# Patient Record
Sex: FEMALE | Race: WHITE | HISPANIC OR LATINO | Employment: PART TIME | URBAN - METROPOLITAN AREA
[De-identification: names, ages, dates, MRNs, and addresses within clinical notes are randomized per-mention and may not be internally consistent; named-entity substitution may affect disease eponyms.]

---

## 2019-03-14 ENCOUNTER — NON-PROVIDER VISIT (OUTPATIENT)
Dept: OBGYN | Facility: CLINIC | Age: 24
End: 2019-03-14

## 2019-03-14 DIAGNOSIS — Z32.00 ENCOUNTER FOR PREGNANCY TEST, RESULT UNKNOWN: ICD-10-CM

## 2019-03-14 LAB
INT CON NEG: NEGATIVE
INT CON POS: POSITIVE
POC URINE PREGNANCY TEST: NEGATIVE

## 2019-03-14 PROCEDURE — 81025 URINE PREGNANCY TEST: CPT | Performed by: OBSTETRICS & GYNECOLOGY

## 2020-07-09 ENCOUNTER — HOSPITAL ENCOUNTER (OUTPATIENT)
Dept: LAB | Facility: MEDICAL CENTER | Age: 25
End: 2020-07-09
Attending: OBSTETRICS & GYNECOLOGY

## 2020-07-09 LAB — B-HCG SERPL-ACNC: 362 MIU/ML (ref 0–5)

## 2020-07-09 PROCEDURE — 84702 CHORIONIC GONADOTROPIN TEST: CPT

## 2020-07-09 PROCEDURE — 36415 COLL VENOUS BLD VENIPUNCTURE: CPT

## 2020-08-03 ENCOUNTER — HOSPITAL ENCOUNTER (OUTPATIENT)
Dept: LAB | Facility: MEDICAL CENTER | Age: 25
End: 2020-08-03
Attending: OBSTETRICS & GYNECOLOGY

## 2020-08-03 LAB — B-HCG SERPL-ACNC: 3028 MIU/ML (ref 0–5)

## 2020-08-03 PROCEDURE — 36415 COLL VENOUS BLD VENIPUNCTURE: CPT

## 2020-08-03 PROCEDURE — 84702 CHORIONIC GONADOTROPIN TEST: CPT

## 2021-05-19 ENCOUNTER — OFFICE VISIT (OUTPATIENT)
Dept: OBGYN | Facility: CLINIC | Age: 26
End: 2021-05-19

## 2021-06-02 PROBLEM — Z34.00 PREGNANCY, SUPERVISION OF FIRST: Status: ACTIVE | Noted: 2021-06-02

## 2021-06-04 ENCOUNTER — HOSPITAL ENCOUNTER (OUTPATIENT)
Facility: MEDICAL CENTER | Age: 26
End: 2021-06-04
Attending: NURSE PRACTITIONER
Payer: COMMERCIAL

## 2021-06-04 ENCOUNTER — INITIAL PRENATAL (OUTPATIENT)
Dept: OBGYN | Facility: CLINIC | Age: 26
End: 2021-06-04

## 2021-06-04 VITALS — DIASTOLIC BLOOD PRESSURE: 60 MMHG | WEIGHT: 160 LBS | SYSTOLIC BLOOD PRESSURE: 112 MMHG

## 2021-06-04 DIAGNOSIS — Z34.00 ENCOUNTER FOR SUPERVISION PREGNANCY IN PRIMIGRAVIDA, ANTEPARTUM: ICD-10-CM

## 2021-06-04 DIAGNOSIS — O09.811 PREGNANCY RESULTING FROM ASSISTED REPRODUCTIVE TECHNOLOGY IN FIRST TRIMESTER: ICD-10-CM

## 2021-06-04 DIAGNOSIS — Z34.90 PRENATAL CARE, ANTEPARTUM: ICD-10-CM

## 2021-06-04 LAB
APPEARANCE UR: NORMAL
BILIRUB UR STRIP-MCNC: NORMAL MG/DL
COLOR UR AUTO: NORMAL
GLUCOSE UR STRIP.AUTO-MCNC: NEGATIVE MG/DL
KETONES UR STRIP.AUTO-MCNC: NORMAL MG/DL
LEUKOCYTE ESTERASE UR QL STRIP.AUTO: NORMAL
NITRITE UR QL STRIP.AUTO: NEGATIVE
PH UR STRIP.AUTO: 5.5 [PH] (ref 5–8)
PROT UR QL STRIP: NEGATIVE MG/DL
RBC UR QL AUTO: NORMAL
SP GR UR STRIP.AUTO: 1.03
UROBILINOGEN UR STRIP-MCNC: NORMAL MG/DL

## 2021-06-04 PROCEDURE — 0500F INITIAL PRENATAL CARE VISIT: CPT | Performed by: NURSE PRACTITIONER

## 2021-06-04 PROCEDURE — 81002 URINALYSIS NONAUTO W/O SCOPE: CPT | Performed by: NURSE PRACTITIONER

## 2021-06-04 RX ORDER — PNV NO.95/FERROUS FUM/FOLIC AC 28MG-0.8MG
TABLET ORAL
COMMUNITY
End: 2021-12-08

## 2021-06-04 ASSESSMENT — EDINBURGH POSTNATAL DEPRESSION SCALE (EPDS)
I HAVE BLAMED MYSELF UNNECESSARILY WHEN THINGS WENT WRONG: YES, SOME OF THE TIME
THE THOUGHT OF HARMING MYSELF HAS OCCURRED TO ME: NEVER
THINGS HAVE BEEN GETTING ON TOP OF ME: NO, MOST OF THE TIME I HAVE COPED QUITE WELL
I HAVE LOOKED FORWARD WITH ENJOYMENT TO THINGS: AS MUCH AS I EVER DID
I HAVE BEEN ABLE TO LAUGH AND SEE THE FUNNY SIDE OF THINGS: AS MUCH AS I ALWAYS COULD
I HAVE FELT SCARED OR PANICKY FOR NO GOOD REASON: YES, SOMETIMES
TOTAL SCORE: 5
I HAVE BEEN ANXIOUS OR WORRIED FOR NO GOOD REASON: NO, NOT AT ALL
I HAVE BEEN SO UNHAPPY THAT I HAVE BEEN CRYING: NO, NEVER
I HAVE FELT SAD OR MISERABLE: NO, NOT AT ALL
I HAVE BEEN SO UNHAPPY THAT I HAVE HAD DIFFICULTY SLEEPING: NOT AT ALL

## 2021-06-04 NOTE — PROGRESS NOTES
Subjective:   Liza Pearson is a 26 y.o.  who presents for her new OB exam.  She is 12w6d with an LOLA of Estimated Date of Delivery: 21 by fertility clinic . She is feeling well and has no concerns at this time. Denies VB, LOF, contractions or pain. No ER visits or previous care in this pregnancy. Denies dysuria, vaginal DC, fever. Reports no fetal movement. Desires AFP.  Declines CF.  Declines NIPT testing.     History reviewed. No pertinent past medical history.    Psych Hx: Patient denies any history of depression, anxiety, PTSD, bipolar or any other psychological issues.     History reviewed. No pertinent surgical history.     OB History    Para Term  AB Living   2       1     SAB TAB Ectopic Molar Multiple Live Births   1                # Outcome Date GA Lbr Kin/2nd Weight Sex Delivery Anes PTL Lv   2 Current            1 SAB 20 7w0d             Obstetric Comments   Pregnancy planned. FOB involved and supportive. Pt does work at [employer]        Gynecological Hx: Denies any hx of STIs, including HSV. Denies any vulvovaginal disorders and no hx of abnormal cervical cytology. Last pap not done.     Sexual Hx: One current male partner, who is FOB.     Contraceptive Hx: Has used condoms in the past and has since discontinued use.     History reviewed. No pertinent family history.     Denies any genetic disorders in family history.     Social History     Socioeconomic History   • Marital status:      Spouse name: Not on file   • Number of children: Not on file   • Years of education: Not on file   • Highest education level: Not on file   Occupational History   • Not on file   Tobacco Use   • Smoking status: Never Smoker   • Smokeless tobacco: Never Used   Substance and Sexual Activity   • Alcohol use: Never   • Drug use: Never   • Sexual activity: Not Currently     Partners: Male   Other Topics Concern   • Not on file   Social History Narrative   • Not on file      Social Determinants of Health     Financial Resource Strain:    • Difficulty of Paying Living Expenses:    Food Insecurity:    • Worried About Running Out of Food in the Last Year:    • Ran Out of Food in the Last Year:    Transportation Needs:    • Lack of Transportation (Medical):    • Lack of Transportation (Non-Medical):    Physical Activity:    • Days of Exercise per Week:    • Minutes of Exercise per Session:    Stress:    • Feeling of Stress :    Social Connections:    • Frequency of Communication with Friends and Family:    • Frequency of Social Gatherings with Friends and Family:    • Attends Gnosticist Services:    • Active Member of Clubs or Organizations:    • Attends Club or Organization Meetings:    • Marital Status:    Intimate Partner Violence:    • Fear of Current or Ex-Partner:    • Emotionally Abused:    • Physically Abused:    • Sexually Abused:        FOB is involve and lives with Liza Pearson.  Pregnancy is planned.    She is currently working at [id number], denies any heavy lifting or exposure to potential teratogens like environmental or occupational toxins.   Denies alcohol use, drug use, or tobacco use in pregnancy.   Denies any current or hx of sexual, emotional or physical abuse or trauma.     Current Medications: PNV   Allergies: Denies allergies to medications, food, or environmental allergies    Objective:      Vitals:    06/04/21 1515   BP: 112/60   Weight: 72.6 kg (160 lb)        See Prenatal Physical and Prenatal Vitals  UA WNL today    Prenatal Physical:  General Exam:  HEENT: normal    Heart: normal    Thyroid: normal    Lungs: normal    Lymph Nodes: normal    Breasts: normal    Neurological: normal    Abdomen: normal    Skin: normal    Extremities: normal    Pelvic Exam:  Vulva:  Normal  Vagina:  Inflammation  Vagina comment:  Very friable cervix  Cervix:  Normal  Uterus (wks):  13  Adnexa:  Normal  Rectum:  Not assessed        Assessment:      1.  IUP @ 12w6d per  US      2.  S=D      3.  See problem list as follows       Patient Active Problem List    Diagnosis Date Noted   • IVF pregnancy  06/04/2021   • Supervision of first pregnancy  06/02/2021         Plan:   - GC/CT & pap done today   - Prenatal labs ordered - lab slip given  - Discussed PNV, nutrition, adequate water intake, and exercise/weight gain in pregnancy  - NOB informational packet with anticipatory guidance given  - Information on Centering Pregnancy given, pt interested   - S/sx of pregnancy warning signs and PTL precautions given  - Complete OB US in 8 wks  - Return to [facility] in 6 wks

## 2021-06-04 NOTE — PROGRESS NOTES
NOB today  LMP: 3/6/21  Last pap: Never  [phone] (home)   Pharmacy confirmed  On PNV  Cystic Fibrosis test declined  AFP   #[id number]  C/o Pt states she feels good

## 2021-06-05 DIAGNOSIS — Z34.00 ENCOUNTER FOR SUPERVISION PREGNANCY IN PRIMIGRAVIDA, ANTEPARTUM: ICD-10-CM

## 2021-06-09 LAB
C TRACH DNA GENITAL QL NAA+PROBE: NEGATIVE
CYTOLOGY REG CYTOL: NORMAL
N GONORRHOEA DNA GENITAL QL NAA+PROBE: NEGATIVE
SPECIMEN SOURCE: NORMAL

## 2021-06-25 ENCOUNTER — APPOINTMENT (OUTPATIENT)
Dept: OBGYN | Facility: CLINIC | Age: 26
End: 2021-06-25
Payer: COMMERCIAL

## 2021-06-30 ENCOUNTER — HOSPITAL ENCOUNTER (OUTPATIENT)
Dept: LAB | Facility: MEDICAL CENTER | Age: 26
End: 2021-06-30
Attending: NURSE PRACTITIONER
Payer: COMMERCIAL

## 2021-06-30 DIAGNOSIS — Z34.00 ENCOUNTER FOR SUPERVISION PREGNANCY IN PRIMIGRAVIDA, ANTEPARTUM: ICD-10-CM

## 2021-06-30 LAB
ABO GROUP BLD: NORMAL
APPEARANCE UR: ABNORMAL
BACTERIA #/AREA URNS HPF: ABNORMAL /HPF
BASOPHILS # BLD AUTO: 0.6 % (ref 0–1.8)
BASOPHILS # BLD: 0.06 K/UL (ref 0–0.12)
BILIRUB UR QL STRIP.AUTO: NEGATIVE
BLD GP AB SCN SERPL QL: NORMAL
COLOR UR: YELLOW
EOSINOPHIL # BLD AUTO: 0.17 K/UL (ref 0–0.51)
EOSINOPHIL NFR BLD: 1.6 % (ref 0–6.9)
EPI CELLS #/AREA URNS HPF: ABNORMAL /HPF
ERYTHROCYTE [DISTWIDTH] IN BLOOD BY AUTOMATED COUNT: 43.6 FL (ref 35.9–50)
GLUCOSE UR STRIP.AUTO-MCNC: NEGATIVE MG/DL
HBV SURFACE AG SER QL: ABNORMAL
HCT VFR BLD AUTO: 35.8 % (ref 37–47)
HCV AB SER QL: NORMAL
HGB BLD-MCNC: 12.2 G/DL (ref 12–16)
HIV 1+2 AB+HIV1 P24 AG SERPL QL IA: NORMAL
IMM GRANULOCYTES # BLD AUTO: 0.04 K/UL (ref 0–0.11)
IMM GRANULOCYTES NFR BLD AUTO: 0.4 % (ref 0–0.9)
KETONES UR STRIP.AUTO-MCNC: 15 MG/DL
LEUKOCYTE ESTERASE UR QL STRIP.AUTO: ABNORMAL
LYMPHOCYTES # BLD AUTO: 2.05 K/UL (ref 1–4.8)
LYMPHOCYTES NFR BLD: 19.2 % (ref 22–41)
MCH RBC QN AUTO: 33.2 PG (ref 27–33)
MCHC RBC AUTO-ENTMCNC: 34.1 G/DL (ref 33.6–35)
MCV RBC AUTO: 97.3 FL (ref 81.4–97.8)
MICRO URNS: ABNORMAL
MONOCYTES # BLD AUTO: 0.65 K/UL (ref 0–0.85)
MONOCYTES NFR BLD AUTO: 6.1 % (ref 0–13.4)
NEUTROPHILS # BLD AUTO: 7.72 K/UL (ref 2–7.15)
NEUTROPHILS NFR BLD: 72.1 % (ref 44–72)
NITRITE UR QL STRIP.AUTO: NEGATIVE
NRBC # BLD AUTO: 0 K/UL
NRBC BLD-RTO: 0 /100 WBC
PH UR STRIP.AUTO: 6 [PH] (ref 5–8)
PLATELET # BLD AUTO: 316 K/UL (ref 164–446)
PMV BLD AUTO: 10.3 FL (ref 9–12.9)
PROT UR QL STRIP: NEGATIVE MG/DL
RBC # BLD AUTO: 3.68 M/UL (ref 4.2–5.4)
RBC UR QL AUTO: ABNORMAL
RH BLD: NORMAL
RUBV AB SER QL: 208 IU/ML
SP GR UR STRIP.AUTO: >=1.03
TREPONEMA PALLIDUM IGG+IGM AB [PRESENCE] IN SERUM OR PLASMA BY IMMUNOASSAY: ABNORMAL
TSH SERPL DL<=0.005 MIU/L-ACNC: 1.05 UIU/ML (ref 0.38–5.33)
UROBILINOGEN UR STRIP.AUTO-MCNC: 0.2 MG/DL
WBC # BLD AUTO: 10.7 K/UL (ref 4.8–10.8)
WBC #/AREA URNS HPF: ABNORMAL /HPF

## 2021-07-02 LAB
AMPHET CTO UR CFM-MCNC: NEGATIVE NG/ML
BACTERIA UR CULT: NORMAL
BARBITURATES CTO UR CFM-MCNC: NEGATIVE NG/ML
BENZODIAZ CTO UR CFM-MCNC: NEGATIVE NG/ML
CANNABINOIDS CTO UR CFM-MCNC: NEGATIVE NG/ML
COCAINE CTO UR CFM-MCNC: NEGATIVE NG/ML
DRUG COMMENT 753798: NORMAL
METHADONE CTO UR CFM-MCNC: NEGATIVE NG/ML
OPIATES CTO UR CFM-MCNC: NEGATIVE NG/ML
PCP CTO UR CFM-MCNC: NEGATIVE NG/ML
PROPOXYPH CTO UR CFM-MCNC: NEGATIVE NG/ML
SIGNIFICANT IND 70042: NORMAL
SITE SITE: NORMAL
SOURCE SOURCE: NORMAL

## 2021-07-26 ENCOUNTER — APPOINTMENT (OUTPATIENT)
Dept: RADIOLOGY | Facility: IMAGING CENTER | Age: 26
End: 2021-07-26
Attending: NURSE PRACTITIONER
Payer: COMMERCIAL

## 2021-07-26 DIAGNOSIS — Z34.00 ENCOUNTER FOR SUPERVISION PREGNANCY IN PRIMIGRAVIDA, ANTEPARTUM: ICD-10-CM

## 2021-07-26 PROCEDURE — 76805 OB US >/= 14 WKS SNGL FETUS: CPT | Mod: TC | Performed by: NURSE PRACTITIONER

## 2021-07-28 ENCOUNTER — ROUTINE PRENATAL (OUTPATIENT)
Dept: OBGYN | Facility: CLINIC | Age: 26
End: 2021-07-28
Payer: COMMERCIAL

## 2021-07-28 VITALS — WEIGHT: 163 LBS | SYSTOLIC BLOOD PRESSURE: 120 MMHG | DIASTOLIC BLOOD PRESSURE: 64 MMHG

## 2021-07-28 DIAGNOSIS — Q27.0 TWO VESSEL CORD: ICD-10-CM

## 2021-07-28 PROCEDURE — 90040 PR PRENATAL FOLLOW UP: CPT | Performed by: NURSE PRACTITIONER

## 2021-07-28 NOTE — PROGRESS NOTES
OB follow up   + fetal movement.  No VB, LOF or UC's.  AFP ordered  [phone] (home)   Preferred pharmacy confirmed.   # [id number]

## 2021-07-28 NOTE — PROGRESS NOTES
SUBJECTIVE:  Pt is a 26 y.o.   at 20w4d  gestation. Presents today for follow-up prenatal care. Reports no issues at this time.  Reports light  fetal movement. Denies regular cramping/contractions, bleeding or leaking of fluid. Denies dysuria, headaches, N/V. Generally feels well today.      OBJECTIVE:  - See prenatal vitals flow  -   Vitals:    21 0948   BP: 120/64   Weight: 73.9 kg (163 lb)                 ASSESSMENT:   - IUP at 20w4d    - S=D   -   Patient Active Problem List    Diagnosis Date Noted   • Two vessel cord 2021   • Pregnancy resulting from assisted reproductive technology in first trimester 2021   • Supervision of first pregnancy  2021         PLAN:  - S/sx pregnancy and labor warning signs vs general discomforts discussed  - Fetal movements and/or kick counts reviewed   - Adequate hydration reinforced  - Nutrition/exercise/vitamin education; continue PNV  - Reviewed SUA and f/u growth scan in 8 weeks  - AFP ordered   - Anticipatory guidance given  - RTC for Centering in two weeks

## 2021-08-06 ENCOUNTER — HOSPITAL ENCOUNTER (OUTPATIENT)
Dept: LAB | Facility: MEDICAL CENTER | Age: 26
End: 2021-08-06
Attending: NURSE PRACTITIONER
Payer: COMMERCIAL

## 2021-08-06 DIAGNOSIS — Q27.0 TWO VESSEL CORD: ICD-10-CM

## 2021-08-06 PROCEDURE — 36415 COLL VENOUS BLD VENIPUNCTURE: CPT

## 2021-08-06 PROCEDURE — 81511 FTL CGEN ABNOR FOUR ANAL: CPT

## 2021-08-09 LAB
# FETUSES US: NORMAL
AFP MOM SERPL: 0.82
AFP SERPL-MCNC: 62 NG/ML
AGE - REPORTED: 26.8 YR
CURRENT SMOKER: NO
FAMILY MEMBER DISEASES HX: NO
GA METHOD: NORMAL
GA: NORMAL WK
HCG MOM SERPL: 0.84
HCG SERPL-ACNC: NORMAL IU/L
HX OF HEREDITARY DISORDERS: NO
IDDM PATIENT QL: NO
INHIBIN A MOM SERPL: 1.13
INHIBIN A SERPL-MCNC: 207 PG/ML
INTEGRATED SCN PATIENT-IMP: NORMAL
PATHOLOGY STUDY: NORMAL
SPECIMEN DRAWN SERPL: NORMAL
U ESTRIOL MOM SERPL: 1.24
U ESTRIOL SERPL-MCNC: 3.81 NG/ML

## 2021-08-25 ENCOUNTER — ROUTINE PRENATAL (OUTPATIENT)
Dept: OBGYN | Facility: CLINIC | Age: 26
End: 2021-08-25
Payer: COMMERCIAL

## 2021-08-25 VITALS — WEIGHT: 166 LBS | DIASTOLIC BLOOD PRESSURE: 64 MMHG | SYSTOLIC BLOOD PRESSURE: 118 MMHG

## 2021-08-25 DIAGNOSIS — Z34.82 ENCOUNTER FOR SUPERVISION OF OTHER NORMAL PREGNANCY IN SECOND TRIMESTER: ICD-10-CM

## 2021-08-25 PROCEDURE — 90040 PR PRENATAL FOLLOW UP: CPT | Performed by: NURSE PRACTITIONER

## 2021-08-25 NOTE — PROGRESS NOTES
SUBJECTIVE:  Pt is a 26 y.o.   at 24w4d  gestation. Presents today for follow-up prenatal care. Reports no issues at this time.  Reports fetal movement. Denies regular cramping/contractions, bleeding or leaking of fluid. Denies dysuria, headaches, N/V. Generally feels well today except some lighter headaches and vision changes every so often, and some lower abdominal stretching pain in her uterus.     OBJECTIVE:  - See prenatal vitals flow  -   Vitals:    21 0813   BP: 118/64   Weight: 75.3 kg (166 lb)                 ASSESSMENT:   - IUP at 24w4d    - S=D   -   Patient Active Problem List    Diagnosis Date Noted   • Two vessel cord 2021   • Pregnancy resulting from assisted reproductive technology in first trimester 2021         PLAN:  - S/sx pregnancy and labor warning signs vs general discomforts discussed  - Fetal movements and/or kick counts reviewed   - Adequate hydration reinforced  - Nutrition/exercise/vitamin education; continue PNV  - Third tri labs ordered   - Remedies for headaches including tylenol with caffeine, reports she eats frequently and drinks enough water   - Reviewed COVID-19 vaccination recommendations: pt reports she has not thought about getting it but desires more information to read over  - F/u growth US scheduled   - Anticipatory guidance given  - RTC in 4 weeks for follow-up prenatal care

## 2021-08-25 NOTE — NON-PROVIDER
OB follow up   + fetal movement.  No VB, LOF or UC's.  Phone # [phone]  Preferred pharmacy confirmed.

## 2021-08-25 NOTE — PROGRESS NOTES
Pt here today for OB follow up  Reports +FM  WT: 166 lb  BP: 118/64  Preferred pharmacy verified with pt.  Pt states no complaints or concerns today  3rd trimester labs ordered today, pt given instructions  Good # [phone]

## 2021-10-05 ENCOUNTER — ROUTINE PRENATAL (OUTPATIENT)
Dept: OBGYN | Facility: CLINIC | Age: 26
End: 2021-10-05
Payer: COMMERCIAL

## 2021-10-05 DIAGNOSIS — O09.811 PREGNANCY RESULTING FROM ASSISTED REPRODUCTIVE TECHNOLOGY IN FIRST TRIMESTER: ICD-10-CM

## 2021-10-05 PROCEDURE — 90040 PR PRENATAL FOLLOW UP: CPT | Performed by: PHYSICIAN ASSISTANT

## 2021-10-06 VITALS — SYSTOLIC BLOOD PRESSURE: 126 MMHG | DIASTOLIC BLOOD PRESSURE: 72 MMHG | WEIGHT: 174 LBS

## 2021-10-06 NOTE — PROGRESS NOTES
OB follow up   + fetal movement.  No VB, LOF or UC's.  Pt states she is having some pain in her upper and lower abdomin   [phone] (home)   Preferred pharmacy confirmed.  Flu vaccine and Tdap ask next appt as computers were down  Please give pt CHECO and instructions as computer was down  BTL declined

## 2021-10-07 NOTE — PROGRESS NOTES
*Epic down during visit, so all info given before visit was from MA. Pt has no complaints with cramping, UCs, VB, LOF, though pt has had some pain in upper abd which is likely fetal position, resolved with position changes. Pt also notes occ lower abd cramping intermittently, so pt urged to incr water intake and PTL precautions reviewed. +FM - will need FKC next visit due to Epic being down. Also will need to be offered flu and TDaP vaccines next visit. Pt also was supposed to have an US for growth today, likely due to S>D, but missed appt, so will reschedule for next avail. Fundal height c/w dates today. Declines BTL. RTC 2 wk or sooner prn.

## 2021-10-18 ENCOUNTER — HOSPITAL ENCOUNTER (OUTPATIENT)
Dept: LAB | Facility: MEDICAL CENTER | Age: 26
End: 2021-10-18
Attending: NURSE PRACTITIONER
Payer: COMMERCIAL

## 2021-10-18 DIAGNOSIS — Z34.82 ENCOUNTER FOR SUPERVISION OF OTHER NORMAL PREGNANCY IN SECOND TRIMESTER: ICD-10-CM

## 2021-10-18 LAB
ERYTHROCYTE [DISTWIDTH] IN BLOOD BY AUTOMATED COUNT: 44.6 FL (ref 35.9–50)
GLUCOSE 1H P 50 G GLC PO SERPL-MCNC: 185 MG/DL (ref 70–139)
HCT VFR BLD AUTO: 32.9 % (ref 37–47)
HGB BLD-MCNC: 10.3 G/DL (ref 12–16)
MCH RBC QN AUTO: 28.8 PG (ref 27–33)
MCHC RBC AUTO-ENTMCNC: 31.3 G/DL (ref 33.6–35)
MCV RBC AUTO: 91.9 FL (ref 81.4–97.8)
PLATELET # BLD AUTO: 275 K/UL (ref 164–446)
PMV BLD AUTO: 11.2 FL (ref 9–12.9)
RBC # BLD AUTO: 3.58 M/UL (ref 4.2–5.4)
TREPONEMA PALLIDUM IGG+IGM AB [PRESENCE] IN SERUM OR PLASMA BY IMMUNOASSAY: NORMAL
WBC # BLD AUTO: 8.8 K/UL (ref 4.8–10.8)

## 2021-10-18 PROCEDURE — 86780 TREPONEMA PALLIDUM: CPT

## 2021-10-18 PROCEDURE — 82950 GLUCOSE TEST: CPT

## 2021-10-18 PROCEDURE — 36415 COLL VENOUS BLD VENIPUNCTURE: CPT

## 2021-10-18 PROCEDURE — 85027 COMPLETE CBC AUTOMATED: CPT

## 2021-10-20 ENCOUNTER — HOSPITAL ENCOUNTER (OUTPATIENT)
Dept: LAB | Facility: MEDICAL CENTER | Age: 26
End: 2021-10-20
Attending: NURSE PRACTITIONER
Payer: COMMERCIAL

## 2021-10-20 ENCOUNTER — ROUTINE PRENATAL (OUTPATIENT)
Dept: OBGYN | Facility: CLINIC | Age: 26
End: 2021-10-20
Payer: COMMERCIAL

## 2021-10-20 ENCOUNTER — APPOINTMENT (OUTPATIENT)
Dept: RADIOLOGY | Facility: IMAGING CENTER | Age: 26
End: 2021-10-20
Attending: NURSE PRACTITIONER
Payer: COMMERCIAL

## 2021-10-20 VITALS — DIASTOLIC BLOOD PRESSURE: 70 MMHG | SYSTOLIC BLOOD PRESSURE: 108 MMHG | WEIGHT: 177 LBS

## 2021-10-20 DIAGNOSIS — R73.09 ELEVATED GLUCOSE: ICD-10-CM

## 2021-10-20 DIAGNOSIS — Z34.83 ENCOUNTER FOR SUPERVISION OF OTHER NORMAL PREGNANCY IN THIRD TRIMESTER: ICD-10-CM

## 2021-10-20 DIAGNOSIS — Q27.0 TWO VESSEL CORD: ICD-10-CM

## 2021-10-20 PROBLEM — O99.019 ANEMIA IN PREGNANCY: Status: ACTIVE | Noted: 2021-10-20

## 2021-10-20 LAB
GLUCOSE 1H P CHAL SERPL-MCNC: 197 MG/DL (ref 65–180)
GLUCOSE 2H P CHAL SERPL-MCNC: 167 MG/DL (ref 65–155)
GLUCOSE 3H P CHAL SERPL-MCNC: 143 MG/DL (ref 65–140)
GLUCOSE BS SERPL-MCNC: 94 MG/DL (ref 65–95)

## 2021-10-20 PROCEDURE — 36415 COLL VENOUS BLD VENIPUNCTURE: CPT

## 2021-10-20 PROCEDURE — 76816 OB US FOLLOW-UP PER FETUS: CPT | Mod: TC | Performed by: NURSE PRACTITIONER

## 2021-10-20 PROCEDURE — 90471 IMMUNIZATION ADMIN: CPT | Performed by: NURSE PRACTITIONER

## 2021-10-20 PROCEDURE — 90040 PR PRENATAL FOLLOW UP: CPT | Performed by: NURSE PRACTITIONER

## 2021-10-20 PROCEDURE — 82952 GTT-ADDED SAMPLES: CPT

## 2021-10-20 PROCEDURE — 82951 GLUCOSE TOLERANCE TEST (GTT): CPT

## 2021-10-20 PROCEDURE — 90715 TDAP VACCINE 7 YRS/> IM: CPT | Performed by: NURSE PRACTITIONER

## 2021-10-20 NOTE — LETTER
Cuente los Movimientos de mares Bebé  Otro paso importante para la reshma de mares bebé    Liza Pearson     [facility]            Dept: [phone]    ¿Cuántas semanas tiene de embarazo? 32w4d    Fecha cuando tiene que comenzar a contar el movimiento: Hoy                  [place] debe usar juan miguel diagrama    Jessica manera en que mares doctor puede controlar a reshma de mares bebé es sabiendo cuantas veces se mueve mares bebé en el útero, o por medio de las “pataditas”.  Usted podrá ayudarle a mares médico al usar cada día el siguiente diagrama.    Cada día, usted debe prestar atención a cuantas horas le lleva a mares bebé moverse 10 veces.  Comience a contar en la mañana, lo antes posible después de haberse levantado.    · Primeramente, escriba la hora en que se mueve mares bebé, hasta llegar a 10 veces.  · Colóquele un check o palomita a cada cuadrito cada vez que mares bebé se mueva hasta que complete 10 veces.  · Escriba la hora cuando termine de contar 10 veces en la última columna.  · Sume el total del tiempo que le llevó contar los 10 movimientos.  · Finalmente, complete el cuadrito de cuantas horas le llevó hacerlo.    Después de tino contado los 10 movimientos, ya no tendrá que contar los demás movimientos por el shruti del día.  A la mañana siguiente, comience a contar de nuevo cuantas veces se mueve el bebé desde el momento en que se levante.    ¿Qué tendría que considerarse un “movimiento”?  Es difícil de decirlo porque es distinto de jessica madre a otra, y de un embarazo a otro.  Lo importante es que cuente el movimiento de la misma manera ludy el transcurso de mares embarazo.  Si tiene preguntas adicionales, pregúntele a mares doctor.    ¡Cuente cuidadosamente cada día!     MUESTRA:  Semana 28    ¿Cuántas horas le ha llevado sentir 10 movimientos?        Hora de Inicio     1     2     3     4     5     6     7     8     9     10   Hora de Finlizar   Antony. 8:20 ·  ·  ·  ·  ·  ·  ·  ·  ·  ·  11:40   Mar.                Mié.               Jue.               Vie.               Sáb.               Dom.                 IMPORTANTE:  Usted debe contactar a mares doctor si le lleva más de 2 horas sentir 10 movimientos de mares bebé.    Cada mañana, escriba la hora de inicio y comience a contar los movimientos de mares bebé.  Hágalo colocándole un check o palomita a cada cuadrito cada vez que sienta un movimiento de mares bebé.  Cuando haya sentido 10 “pataditas”, escriba la hora en que terminó de contar en la última columna.  Luego, complete en la cajita (arriba de la anne de check o palomita) el número total de horas que le llevó hacerlo.  Asegúrese de leer completamente las instrucciones en la página anterior.

## 2021-10-20 NOTE — PROGRESS NOTES
SUBJECTIVE:  Pt is a 26 y.o.   at 32w4d  gestation. Presents today for follow-up prenatal care. Reports no issues at this time.  Reports good fetal movement. Denies regular cramping/contractions, bleeding or leaking of fluid. Denies dysuria, headaches, N/V. Generally feels well today except some lower abdominal pain when she walks or changes positions quickly.      OBJECTIVE:  - See prenatal vitals flow  - There were no vitals filed for this visit.              ASSESSMENT:   - IUP at 32w4d    - S=D   -   Patient Active Problem List    Diagnosis Date Noted   • Anemia in pregnancy 10/20/2021   • Two vessel cord 2021   • Pregnancy resulting from assisted reproductive technology in first trimester 2021         PLAN:  - S/sx pregnancy and labor warning signs vs general discomforts discussed  - Fetal movements and/or kick counts reviewed   - Adequate hydration reinforced  - Nutrition/exercise/vitamin education; continue PNV  - Growth US WNL  - GTT ordered   - Reviewed iron supplementation and increasing iron rich foods   - S/p Tdap  Today, no flu vaccines in clinic  - Anticipatory guidance given  - RTC in 2 weeks for follow-up prenatal care

## 2021-10-20 NOTE — PROGRESS NOTES
OB follow up   + fetal movement.  CHECO and instructions given   No VB, LOF or UC's.  [phone] (home)   Preferred pharmacy confirmed.  Tdap given today  1 HR GTT elevated. Needs 3 HR GTT   # [id number]

## 2021-10-21 DIAGNOSIS — R73.09 ELEVATED GLUCOSE: ICD-10-CM

## 2021-10-22 ENCOUNTER — TELEPHONE (OUTPATIENT)
Dept: OBGYN | Facility: CLINIC | Age: 26
End: 2021-10-22

## 2021-10-22 NOTE — TELEPHONE ENCOUNTER
----- Message from C[place]AND Jeronimo sent at 10/21/2021  8:07 AM PDT -----  Pt needs MD appt, hgba1c and diabetes education. Diabetic supplies she buys over the counter due to having access to health right?  1/22/21@ 4:24. Patient notified regarding new diagnosis for GDM, diabetes education scheduled for 10/26/21.  F/u @ [facility] for diabetic clinic for   11/4/21. Patient  Given instruction to get Relion meter/supllies OTC. Advised to bring meter and supplies to class. Patient informed to bring book and meter to each visit.

## 2021-10-26 ENCOUNTER — NON-PROVIDER VISIT (OUTPATIENT)
Dept: OBGYN | Facility: CLINIC | Age: 26
End: 2021-10-26
Payer: COMMERCIAL

## 2021-10-26 VITALS — WEIGHT: 179.8 LBS | DIASTOLIC BLOOD PRESSURE: 93 MMHG | SYSTOLIC BLOOD PRESSURE: 144 MMHG | HEART RATE: 85 BPM

## 2021-10-26 DIAGNOSIS — O24.419 GESTATIONAL DIABETES MELLITUS (GDM) IN THIRD TRIMESTER, GESTATIONAL DIABETES METHOD OF CONTROL UNSPECIFIED: ICD-10-CM

## 2021-10-26 PROCEDURE — 98961 EDU&TRN PT SLF-MGMT NQHP 2-4: CPT | Performed by: NURSE PRACTITIONER

## 2021-10-26 NOTE — PROGRESS NOTES
Liza attended [place] Gestational Diabetes class. Pt brought in and was taught to use a Reli On premier meter. Return demo done with finger stick of 84, which was 14.5 hours pp. Pt walks 2 x week for 30 minutes.  Pt was given education on gestational diabetes diet and it's importance. Reviewed handouts given: Breakfast/Snack ideas, carbohydrate servings, Sources of simple sugar and Diet recommendations for gestational diabetes all in Hungarian. Pt was given a 2000 calorie meal plan with times set. Copy to be scanned to chart.   Discussed what she needs to do after delivery for herself and family to limit risk for type two diabetes. All education and handouts given in Hungarian, with help from Belem CRANDALL.

## 2021-10-26 NOTE — LETTER
October 26, 2021                   Re: Liza Pearson     1995         [id number]       Maribell RIVERA      Dear :Maribell RIVERA    On 10/26/2021, your patient Liza Pearson, received 2 hours of nutrition and Diabetes education from the [facility] for management of her gestational diabetes.  Her EDC is  : 12/11/21.  We taught the following subjects:    Introduction to gestational diabetes, benefits and responsibilities of patient, physiology of diabetes and the diease process, benefits of blood glucose monitoring and record keeping, medication action and possible side effects, hypoglycemia, sick day management, exercise, stress reduction and travel with diabetes.       Nurse assessment / Education:    Comments:    BP:Blood Pressure: 144/93   Edema:No     Weight:Weight: 81.6 kg (179 lb 12.8 oz)         Complaints:No     Pathophysiology of diabetes in pregnancy    Discuss  potential maternal and fetal complications in pregnancy with diabetes.     Importance of blood glucose monitoring   Proper testing technique using a Reli On Premier meter.    At 0931, the meter read 84, which was 14.5 hours after eating.  Testing: fasting and one hour after meals,  expected ranges and rationale for strict control.   Urine ketone testing and rationale    [facility]    Ketone test today:No       Recognition and treatment of hypoglycemia.     Insulin taught: No  Insulin briefly dicussed at this time.    Should patient require insulin later in pregnancy, she would need further education.     Reviewed fetal kick counts and other tests to determine fetal well-being  Discuss benefits and risks of exercise in pregnancy  Discuss when to call Doctor  Discuss sick day care  Importance of wearing diabetes identification    Liza attended [place] Gestational Diabetes class. Pt brought in and was taught to use a Reli On premier meter. Return demo done with  finger stick of 84, which was 14.5 hours pp. Pt walks 2 x week for 30 minutes.  Pt was given education on gestational diabetes diet and it's importance. Reviewed handouts given: Breakfast/Snack ideas, carbohydrate servings, Sources of simple sugar and Diet recommendations for gestational diabetes all in Lithuanian. Pt was given a 2000 calorie meal plan with times set. Copy to be scanned to chart.   Discussed what she needs to do after delivery for herself and family to limit risk for type two diabetes. All education and handouts given in Lithuanian, with help from Belem CRANDALL.    Patient/caregiver appeared to understand the content as demonstrated by appropriate questions.     Liza Pearson was encouraged to discuss this further with you.    Hopefully this will help in your management of her care.  If we can be of further assistance, please feel free to call.    Thank you for the referral.    Sincerely,  Belem Kan RN CDE  Certified Diabetes Educator

## 2021-11-04 ENCOUNTER — ROUTINE PRENATAL (OUTPATIENT)
Dept: OBGYN | Facility: CLINIC | Age: 26
End: 2021-11-04

## 2021-11-04 ENCOUNTER — PHARMACY VISIT (OUTPATIENT)
Dept: PHARMACY | Facility: MEDICAL CENTER | Age: 26
End: 2021-11-04
Payer: COMMERCIAL

## 2021-11-04 VITALS — WEIGHT: 181 LBS | DIASTOLIC BLOOD PRESSURE: 67 MMHG | SYSTOLIC BLOOD PRESSURE: 118 MMHG

## 2021-11-04 DIAGNOSIS — O09.811 PREGNANCY RESULTING FROM ASSISTED REPRODUCTIVE TECHNOLOGY IN FIRST TRIMESTER: ICD-10-CM

## 2021-11-04 DIAGNOSIS — O24.419 GDM, CLASS A2: ICD-10-CM

## 2021-11-04 DIAGNOSIS — Q27.0 TWO VESSEL CORD: ICD-10-CM

## 2021-11-04 PROCEDURE — 90040 PR PRENATAL FOLLOW UP: CPT | Performed by: OBSTETRICS & GYNECOLOGY

## 2021-11-04 PROCEDURE — RXMED WILLOW AMBULATORY MEDICATION CHARGE: Performed by: OBSTETRICS & GYNECOLOGY

## 2021-11-04 RX ORDER — GLUCOSAMINE HCL/CHONDROITIN SU 500-400 MG
CAPSULE ORAL
Qty: 100 EACH | Refills: 2 | Status: SHIPPED | OUTPATIENT
Start: 2021-11-04 | End: 2021-12-09

## 2021-11-04 RX ORDER — BLOOD SUGAR DIAGNOSTIC
STRIP MISCELLANEOUS
Qty: 100 EACH | Refills: 1 | Status: SHIPPED | OUTPATIENT
Start: 2021-11-04 | End: 2021-12-09

## 2021-11-04 NOTE — PROGRESS NOTES
Chief complaint: Return OB visit    S: Pt here for OB follow up. Doing well.   positive fetal movement.    negative vaginal bleeding.    negative leakage of fluid.    negative contractions.    Reviewed blood sugar log with patient.  Reviewed diet.  Questions answered.    O: VSS Afeb       See prenatal vitals, chart for today's exam    FBS range: 84-95, majority above 90  1hr post prandial range: , majority within normal limits    Insulin regimen  NPH a.m. 0 , regular a.m. 0  Regular with dinner 0, NPH at bedtime 0       A/P:  26 y.o.  34w5d with A2 GDM who presents for obstetric follow-up.    1.  Labor precautions and fetal kick counts educated  2.  Change insulin dosage as follows:          AM: 0NPH, 0Reg, Before dinner: 0Reg, QHS: 5NPH  3.  NSTs: 2x/week to begin at 32 weeks.  4.  Continue prenatal vitamins.  5.  Watch weight gain.  Exercise at least 30 minutes daily  6.  Drink at least 2 liters of water daily.  7.  Encouraged prenatal classes.  8.  Follow-up in 1weeks for obstetric follow-up.    Begin insulin as the majority of the fasting levels are elevated.  Prescription sent to patient's pharmacy.  Will also need insulin teaching.    NST reactive today.  Begin twice-weekly NST.  Delivery at 39 weeks gestation    Will need follow-up growth ultrasound later this month.  Last ultrasound shows growth at 79 percentile with two-vessel cord

## 2021-11-04 NOTE — NON-PROVIDER
Pt here today for OB follow up  Pt states lower pelvis pain occasionally   Reports +FM  Good #[phone]   Pharmacy Confirmed.  New medication taken for nausea pt not sure of name at the moment

## 2021-11-05 ENCOUNTER — PHARMACY VISIT (OUTPATIENT)
Dept: PHARMACY | Facility: MEDICAL CENTER | Age: 26
End: 2021-11-05
Payer: COMMERCIAL

## 2021-11-08 ENCOUNTER — HOSPITAL ENCOUNTER (EMERGENCY)
Facility: MEDICAL CENTER | Age: 26
End: 2021-11-08
Attending: OBSTETRICS & GYNECOLOGY | Admitting: OBSTETRICS & GYNECOLOGY
Payer: MEDICAID

## 2021-11-08 ENCOUNTER — ROUTINE PRENATAL (OUTPATIENT)
Dept: OBGYN | Facility: CLINIC | Age: 26
End: 2021-11-08
Payer: COMMERCIAL

## 2021-11-08 VITALS
SYSTOLIC BLOOD PRESSURE: 135 MMHG | HEIGHT: 64 IN | HEART RATE: 66 BPM | OXYGEN SATURATION: 98 % | DIASTOLIC BLOOD PRESSURE: 77 MMHG | WEIGHT: 181 LBS | BODY MASS INDEX: 30.9 KG/M2

## 2021-11-08 DIAGNOSIS — N30.00 ACUTE CYSTITIS WITHOUT HEMATURIA: ICD-10-CM

## 2021-11-08 DIAGNOSIS — Q27.0 TWO VESSEL CORD: ICD-10-CM

## 2021-11-08 DIAGNOSIS — O24.419 GDM, CLASS A2: ICD-10-CM

## 2021-11-08 DIAGNOSIS — O09.811 PREGNANCY RESULTING FROM ASSISTED REPRODUCTIVE TECHNOLOGY IN FIRST TRIMESTER: ICD-10-CM

## 2021-11-08 LAB
ALBUMIN SERPL BCP-MCNC: 3.1 G/DL (ref 3.2–4.9)
ALBUMIN/GLOB SERPL: 0.9 G/DL
ALP SERPL-CCNC: 140 U/L (ref 30–99)
ALT SERPL-CCNC: 13 U/L (ref 2–50)
ANION GAP SERPL CALC-SCNC: 11 MMOL/L (ref 7–16)
APPEARANCE UR: CLEAR
AST SERPL-CCNC: 14 U/L (ref 12–45)
BACTERIA #/AREA URNS HPF: ABNORMAL /HPF
BASOPHILS # BLD AUTO: 0.3 % (ref 0–1.8)
BASOPHILS # BLD: 0.03 K/UL (ref 0–0.12)
BILIRUB SERPL-MCNC: 0.2 MG/DL (ref 0.1–1.5)
BILIRUB UR QL STRIP.AUTO: NEGATIVE
BUN SERPL-MCNC: 7 MG/DL (ref 8–22)
CALCIUM SERPL-MCNC: 8.8 MG/DL (ref 8.5–10.5)
CHLORIDE SERPL-SCNC: 105 MMOL/L (ref 96–112)
CO2 SERPL-SCNC: 19 MMOL/L (ref 20–33)
COLOR UR: YELLOW
CREAT SERPL-MCNC: 0.54 MG/DL (ref 0.5–1.4)
CREAT UR-MCNC: 29.77 MG/DL
EOSINOPHIL # BLD AUTO: 0.06 K/UL (ref 0–0.51)
EOSINOPHIL NFR BLD: 0.6 % (ref 0–6.9)
EPI CELLS #/AREA URNS HPF: ABNORMAL /HPF
ERYTHROCYTE [DISTWIDTH] IN BLOOD BY AUTOMATED COUNT: 61 FL (ref 35.9–50)
GLOBULIN SER CALC-MCNC: 3.5 G/DL (ref 1.9–3.5)
GLUCOSE SERPL-MCNC: 82 MG/DL (ref 65–99)
GLUCOSE UR STRIP.AUTO-MCNC: NEGATIVE MG/DL
HCT VFR BLD AUTO: 34 % (ref 37–47)
HGB BLD-MCNC: 11 G/DL (ref 12–16)
HYALINE CASTS #/AREA URNS LPF: ABNORMAL /LPF
IMM GRANULOCYTES # BLD AUTO: 0.05 K/UL (ref 0–0.11)
IMM GRANULOCYTES NFR BLD AUTO: 0.5 % (ref 0–0.9)
KETONES UR STRIP.AUTO-MCNC: NEGATIVE MG/DL
LEUKOCYTE ESTERASE UR QL STRIP.AUTO: ABNORMAL
LYMPHOCYTES # BLD AUTO: 1.35 K/UL (ref 1–4.8)
LYMPHOCYTES NFR BLD: 14.6 % (ref 22–41)
MCH RBC QN AUTO: 30.4 PG (ref 27–33)
MCHC RBC AUTO-ENTMCNC: 32.4 G/DL (ref 33.6–35)
MCV RBC AUTO: 93.9 FL (ref 81.4–97.8)
MICRO URNS: ABNORMAL
MONOCYTES # BLD AUTO: 0.43 K/UL (ref 0–0.85)
MONOCYTES NFR BLD AUTO: 4.6 % (ref 0–13.4)
NEUTROPHILS # BLD AUTO: 7.35 K/UL (ref 2–7.15)
NEUTROPHILS NFR BLD: 79.4 % (ref 44–72)
NITRITE UR QL STRIP.AUTO: NEGATIVE
NRBC # BLD AUTO: 0 K/UL
NRBC BLD-RTO: 0 /100 WBC
NST ACOUSTIC STIMULATION: NORMAL
NST ACTION NECESSARY: NORMAL
NST ASSESSMENT: NORMAL
NST BASELINE: NORMAL
NST INDICATIONS: NORMAL
NST OTHER DATA: NORMAL
NST READ BY: NORMAL
NST RETURN: NORMAL
NST UTERINE ACTIVITY: NORMAL
PH UR STRIP.AUTO: 6 [PH] (ref 5–8)
PLATELET # BLD AUTO: 231 K/UL (ref 164–446)
PMV BLD AUTO: 11.3 FL (ref 9–12.9)
POTASSIUM SERPL-SCNC: 4.1 MMOL/L (ref 3.6–5.5)
PROT SERPL-MCNC: 6.6 G/DL (ref 6–8.2)
PROT UR QL STRIP: NEGATIVE MG/DL
PROT UR-MCNC: 6 MG/DL (ref 0–15)
PROT/CREAT UR: 202 MG/G (ref 10–107)
RBC # BLD AUTO: 3.62 M/UL (ref 4.2–5.4)
RBC # URNS HPF: ABNORMAL /HPF
RBC UR QL AUTO: NEGATIVE
SODIUM SERPL-SCNC: 135 MMOL/L (ref 135–145)
SP GR UR STRIP.AUTO: 1.01
URATE SERPL-MCNC: 4.1 MG/DL (ref 1.9–8.2)
UROBILINOGEN UR STRIP.AUTO-MCNC: 0.2 MG/DL
WBC # BLD AUTO: 9.3 K/UL (ref 4.8–10.8)
WBC #/AREA URNS HPF: ABNORMAL /HPF

## 2021-11-08 PROCEDURE — 80053 COMPREHEN METABOLIC PANEL: CPT

## 2021-11-08 PROCEDURE — 85025 COMPLETE CBC W/AUTO DIFF WBC: CPT

## 2021-11-08 PROCEDURE — 81001 URINALYSIS AUTO W/SCOPE: CPT

## 2021-11-08 PROCEDURE — 99283 EMERGENCY DEPT VISIT LOW MDM: CPT

## 2021-11-08 PROCEDURE — 36415 COLL VENOUS BLD VENIPUNCTURE: CPT

## 2021-11-08 PROCEDURE — 87086 URINE CULTURE/COLONY COUNT: CPT

## 2021-11-08 PROCEDURE — 84550 ASSAY OF BLOOD/URIC ACID: CPT

## 2021-11-08 PROCEDURE — 84156 ASSAY OF PROTEIN URINE: CPT

## 2021-11-08 PROCEDURE — 82570 ASSAY OF URINE CREATININE: CPT

## 2021-11-08 PROCEDURE — 59025 FETAL NON-STRESS TEST: CPT

## 2021-11-08 RX ORDER — NITROFURANTOIN 25; 75 MG/1; MG/1
100 CAPSULE ORAL 2 TIMES DAILY
Qty: 10 CAPSULE | Refills: 0 | Status: SHIPPED | OUTPATIENT
Start: 2021-11-08 | End: 2021-11-13

## 2021-11-08 ASSESSMENT — PAIN SCALES - GENERAL: PAINLEVEL: 0 - NO PAIN

## 2021-11-08 NOTE — PROGRESS NOTES
27yo, , edc12/11, 35.2 presents from office for a PIH workup. Pt denies LOF, vag bleeding. POS fm. EFM and Tyler placed. BP set serially.   1025 Lab here to draw.   Dr. Peterson updated with lab results. Urine culture started. Pt to  Macrobid from pharmacy. Okay to discharge to home.   1130  Discharge instructions given, pt states understanding. Reactive strip obtained. Pt discharged to home  in stable condition, ambulatory, with FOB.

## 2021-11-08 NOTE — DISCHARGE INSTRUCTIONS
Pre-term Labor (<37 weeks):  Call your physician or return to the hospital if:  · You have painless regular contractions more than 4 in one hour.  · Your water breaks (remember time and color).  · You have menstrual-like cramps, a low dull backache or pressure in your pelvis or back.  · Your baby does not move enough to complete the daily kick count (10 movements in 2 hours).  · Your baby moves much less often than on the days before or you have not felt your baby move all day.  · Please review the MEDICATION LIST section of your AFTER VISIT SUMMARY document.  · Take your medication as prescribed      Preeclampsia y eclampsia  Preeclampsia and Eclampsia  La preeclampsia es jessica afección grave que puede desarrollarse ludy el embarazo. Esta afección causa presión arterial holly y el aumento de proteína en la orina, junto con otros síntomas, yaniv maty de ladan y cambios en la visión. Estos síntomas pueden aparecer a medida que la afección empeora. La preeclampsia puede presentarse a las 20 semanas de gestación o posteriormente.  El diagnóstico y el tratamiento tempranos de la preeclampsia son muy importantes. Si no se la trata de inmediato, puede causarles problemas graves a usted y al bebé. Jessica complicación que puede provocar es la eclampsia. La eclampsia es jessica afección que causa temblores o contracciones musculares (convulsiones) y otros problemas graves en la madre. Ludy el embarazo, el parto del bebé puede ser el mejor tratamiento para la preeclampsia o la eclampsia. En la mayoría de las mujeres, los síntomas de la preeclampsia y la eclampsia desaparecen después de mildred a cony.  En casos poco frecuentes, jessica chon puede desarrollar preeclampsia después de imldred a cony (preeclampsia posparto). [place] normalmente ocurre dentro de las 48 horas después del nacimiento del bebé, deysi puede aparecer hasta 6 semanas después de mildred a cony.  ¿Cuáles son las causas?  Se desconoce la causa de la preeclampsia.  ¿Qué incrementa  el riesgo?  Los siguientes factores de riesgo pueden hacer que usted sea más propensa a tener preeclampsia:  · Estar embarazada por primera vez.  · Vernon tenido preeclampsia ludy un embarazo anterior.  · Tener antecedentes familiares de preeclampsia.  · Tener presión arterial holly.  · Estar embarazada de más de un bebé.  · Tener 35 años o más.  · Ser de josé afroamericana.  · Tener enfermedad renal o diabetes.  · Tener afecciones médicas yaniv lupus o enfermedades de la isabel.  · Tener mucho sobrepeso (obesidad).  ¿Cuáles son los signos o los síntomas?  Los síntomas más frecuentes son los siguientes:  · Dolor de ladan intenso.  · Problemas visuales, yaniv visión doble o borrosa.  · Dolor abdominal, especialmente en la alessandro superior del abdomen.  Otros síntomas que pueden aparecer a medida que la afección empeora incluyen:  · Aumento repentino de peso.  · Hinchazón repentina de las urbano, el sukh, las piernas y los pies.  · Náuseas y vómitos intensos.  · Adormecimiento del sukh, los brazos, las piernas y los pies.  · Mareos.  · Orinar menos que lo habitual.  · Hablar arrastrando las palabras.  · Convulsiones.  ¿Cómo se diagnostica?  No hay pruebas de detección de la preeclampsia. El médico le hará preguntas sobre los síntomas y verificará la presencia de signos de preeclampsia ludy las visitas prenatales. También pueden hacerle otros estudios que incluyen:  · Control de la presión arterial.  · Análisis de orina para detectar la presencia proteínas. El médico la controlará en cada visita prenatal.  · Análisis de isabel.  · Control de la frecuencia cardíaca del bebé.  · Ecografía.  ¿Cómo se trata?  Usted junto con mares médico determinarán el mejor enfoque de tratamiento para usted. El tratamiento puede incluir:  · Realizarse exámenes prenatales con más frecuencia para detectar signos de preeclampsia, si tiene un mayor riesgo de desarrollar dicha afección.  · Medicamentos para bajar la presión  arterial.  · Permanecer en el hospital si la afección es grave. Allí, el tratamiento estará centrado en el control de la presión arterial y la cantidad de líquidos en el cuerpo (retención de líquidos).  · Paola medicamentos (sulfato de magnesio) para prevenir las convulsiones. El medicamento se puede administrar yaniv jessica inyección o por vía intravenosa.  · Paola jessica dosis baja de aspirina ludy el embarazo.  · Jasbir a cony a mares bebé antes de tiempo. Pueden provocarle el trabajo de parto con medicamentos (inducirse) o realizarle un parto por cesárea.  Siga estas instrucciones en mares casa:  Comida y bebida    · Lisa suficiente líquido yaniv para mantener la orina de color amarillo pálido.  · Evite la cafeína.  Estilo de gee  · No consuma ningún producto que contenga nicotina o tabaco, yaniv cigarrillos y cigarrillos electrónicos. Si necesita ayuda para dejar de fumar, consulte al médico.  · No consuma drogas ni alcohol.  · Evite las situaciones estresantes tanto yaniv sea posible. Paul reposo y duerma alonso.  Instrucciones generales  · [place] los medicamentos de venta lambert y los recetados solamente yaniv se lo haya indicado el médico.  · Acuéstese del lado марина mientras hace reposo. Pierz mantiene la presión fuera de los vasos sanguíneos principales.  · [place] (eleve) los pies mientras esté sentada o acostada. Intente colocar algunas almohadas debajo de las pantorrillas.  · Paul ejercicio regularmente. Consulte al médico qué tipos de ejercicios son seguros para usted.  · Concurra a todas las visitas prenatales y de control yaniv se lo haya indicado el médico. Pierz es importante.  ¿Cómo se garth?  No se conoce jessica forma de prevenir el desarrollo de la preeclampsia o la eclampsia. Sin embargo, para reducir el riesgo de tener complicaciones y detectar problemas de manera temprana, paul lo siguiente:  · Reciba el cuidado prenatal con regularidad. Es posible que el médico pueda diagnosticar y tratar la afección de forma  temprana.  · Mantenga un peso saludable. Pídale al médico que la ayude a controlar mares peso ludy del embarazo.  · Trabaje con el médico para controlar cualquier afección médica a kaitlynn plazo (crónica) que tenga, yaniv diabetes o problemas renales.  · Es posible que le zulema estudios de la presión arterial y de la función renal después del parto.  · El médico puede indicarle que tome jessica dosis baja de aspirina ludy el próximo embarazo.  Comuníquese con un médico si:  · Tiene síntomas que mares médico le ha informado que pueden requerir más tratamiento o control, por ejemplo:  ? Maty de ladan.  ? Náuseas o vómitos.  ? Dolor abdominal.  ? Mareos.  ? Desvanecimiento.  Solicite ayuda inmediatamente si:  · Siente de forma intensa:  ? Dolor abdominal.  ? Maty de ladan que no mejoran.  ? Mareos.  ? Problemas de visión.  ? Confusión.  ? Náuseas o vómitos.  · Tiene alguno de los siguientes síntomas:  ? Jessica convulsión.  ? Aumento de peso repentino y rápido.  ? Hinchazón repentina en las urbano, los tobillos o el sukh.  ? Dificultad para  cualquier parte del cuerpo.  ? Adormecimiento en alguna parte del cuerpo.  ? Dificultad para hablar.  ? Hemorragias anormales.  · Se desmaya.  Resumen  · La preeclampsia es jesisca afección grave que puede desarrollarse ludy el embarazo.  · Esta afección causa presión arterial holly y el aumento de proteína en la orina, junto con otros síntomas, yaniv maty de ladan y cambios en la visión.  · El diagnóstico y el tratamiento tempranos de la preeclampsia son muy importantes. Si no se la trata de inmediato, puede causarles problemas graves a usted y al bebé.  · Solicite ayuda de inmediato si tiene síntomas a los que mares médico le indicó que debería estar atenta.  Esta información no tiene yaniv fin reemplazar el consejo del médico. Asegúrese de hacerle al médico cualquier pregunta que tenga.  Document Released: 09/27/2006 Document Revised: 09/12/2019 Document Reviewed:  07/24/2017  [employer] Patient Education © 2020 [employer]     MEDICATION (MACROBID) AT YOUR PHARMACY AND TAKE AS DIRECTED.   KEEP YOUR APPT AT [email or web address] ON Thursday.

## 2021-11-08 NOTE — PROGRESS NOTES
NST secondary to GDM A2    Elevated blood pressures noted.  150/77.  Was sent to labor delivery for further evaluation

## 2021-11-08 NOTE — ED PROVIDER NOTES
LABOR AND DELIVERY OB NOTE    PATIENT ID:  NAME:  Liza Pearson  MRN:               [id number]  YOB: 1995    CC: Elevated blood pressure in the office.    HPI:  Liza Pearson is a 26 y.o. female  at 35w2d by a 12 week ultrasound LMP on Patient's last menstrual period was 03/10/2021 (approximate).. Estimated Date of Delivery: 21  Patient presents complaining of no uterine contractions, with no loss of fluid.  normal fetal movement.  no vaginal bleeding.  Pregnancy was complicated by GDM A2 with 5 units NPH nightly.  Receiving twice weekly NSTs for GDM.  Elevated blood pressure in the office 150/77.  Serial blood pressures in OB ED today demonstrate no blood pressure is elevated over 140/90.  Patient denies any fevers, chills, headaches, vision changes, right upper quadrant abdominal pain, or leg swelling.    ROS: Patient denies any fever chills, nausea, vomiting, headache, chest pain, shortness of breath, or dysuria or unusual swelling of hands or feet.     Prenatal Care: Obtained at [facility]    Prenatal Labs:   HepBsAg: Neg HIV: Neg Rubella: Imm   RPR: Neg PAP: Neg GBS: unk   GC/CT: Neg A+/ Ab Neg Quad Screen: Neg   Recent Labs     21  1025   WBC 9.3   RBC 3.62*   HEMOGLOBIN 11.0*   HEMATOCRIT 34.0*   MCV 93.9   MCH 30.4   RDW 61.0*   PLATELETCT 231   MPV 11.3   NEUTSPOLYS 79.40*   LYMPHOCYTES 14.60*   MONOCYTES 4.60   EOSINOPHILS 0.60   BASOPHILS 0.30     Recent Labs     21  1025   SODIUM 135   POTASSIUM 4.1   CHLORIDE 105   CO2 19*   GLUCOSE 82   BUN 7*          IMAGING:  OB ultrasound:   10/20/2021 8:06 AM     HISTORY/REASON FOR EXAM:  Follow-up SUA in 8 weeks     TECHNIQUE/EXAM DESCRIPTION: OB limited ultrasound.     COMPARISON:  Obstetrical ultrasound 2021     FINDINGS:  Fetal Lie:  Vertex  LMP:  3/10/2021  Clinical LOLA by LMP:  12/15/2021     Placenta (Location):  Posterior  Placenta Previa: No  Placental Grade: I     Amniotic Fluid Volume:   MINDI = 14.26 cm     Fetal Heart Rate:  152 bpm     No maternal adnexal mass is identified.     Fetal Biometry  BPD    8.11 cm, 32 weeks, 4 days, (42.5%)  HC    30.72 cm, 34 weeks, 2 days, (57.7%)  AC    29.61 cm, 33 weeks, 4 days, (78.5%)  Femur Length    6.75 cm, 34 weeks, 5 days, (88.4%)  Humerus Length    5.95 cm, 34 weeks, 4 days, (94.7%)     EGA by this US:  33 weeks, 6 days  LOLA by this US: 2021  LOLA by 1st US:  12/15/2021     Estimated Fetal Weight:  2297 grams  EFW Percentile: 79.3%     Comments:  Redemonstration of 2 vessel cord     IMPRESSION:     Single intrauterine pregnancy of an estimated gestational age of 33 weeks, 6 days with an estimated date of delivery of 2021.     Redemonstration of 2 vessel cord    POB Hx:  OB History    Para Term  AB Living   2       1     SAB IAB Ectopic Molar Multiple Live Births   1                # Outcome Date GA Lbr Kin/2nd Weight Sex Delivery Anes PTL Lv   2 Current            1 SAB 20 7w0d             Obstetric Comments   Pregnancy planned. FOB involved and supportive. Pt does work at [employer]       [facility]/Problem List:    Past Medical History:   Diagnosis Date   • Anemia in pregnancy 10/20/2021   • GDM, class A2 2021     Patient Active Problem List    Diagnosis Date Noted   • GDM, class A2 2021   • Anemia in pregnancy 10/20/2021   • Two vessel cord 2021   • Pregnancy resulting from assisted reproductive technology in first trimester 2021       Current Outpatient Medications:  No current facility-administered medications on file prior to encounter.     No current outpatient medications on file prior to encounter.       PSH:    No past surgical history on file.    Allergies:   No Known Allergies    SH:  Social History     Socioeconomic History   • Marital status:      Spouse name: Not on file   • Number of children: Not on file   • Years of education: Not on file   • Highest education level: Not on file    Occupational History   • Not on file   Tobacco Use   • Smoking status: Never Smoker   • Smokeless tobacco: Never Used   Substance and Sexual Activity   • Alcohol use: Never   • Drug use: Never   • Sexual activity: Not Currently     Partners: Male   Other Topics Concern   • Not on file   Social History Narrative   • Not on file     Social Determinants of Health     Financial Resource Strain:    • Difficulty of Paying Living Expenses: Not on file   Food Insecurity:    • Worried About Running Out of Food in the Last Year: Not on file   • Ran Out of Food in the Last Year: Not on file   Transportation Needs:    • Lack of Transportation (Medical): Not on file   • Lack of Transportation (Non-Medical): Not on file   Physical Activity:    • Days of Exercise per Week: Not on file   • Minutes of Exercise per Session: Not on file   Stress:    • Feeling of Stress : Not on file   Social Connections:    • Frequency of Communication with Friends and Family: Not on file   • Frequency of Social Gatherings with Friends and Family: Not on file   • Attends Restorationism Services: Not on file   • Active Member of Clubs or Organizations: Not on file   • Attends Club or Organization Meetings: Not on file   • Marital Status: Not on file   Intimate Partner Violence:    • Fear of Current or Ex-Partner: Not on file   • Emotionally Abused: Not on file   • Physically Abused: Not on file   • Sexually Abused: Not on file   Housing Stability:    • Unable to Pay for Housing in the Last Year: Not on file   • Number of Places Lived in the Last Year: Not on file   • Unstable Housing in the Last Year: Not on file         PHYSICAL EXAM:  Vitals:    11/08/21 1024 11/08/21 1039 11/08/21 1054 11/08/21 1109   BP: 133/82 125/81 136/76 135/77   Pulse: 67 83 64 66   SpO2:       Weight:       Height:         No data recorded.    General: No acute distress, resting comfortably in bed.  HEENT: normocephalic, nontraumatic, PERRLA, EOMI  Cardiovascular: Heart RRR with  no murmurs, rubs or gallops. Distal Pulses 2+  Respiratory: symmetric chest expansion, lungs CTA bilaterally with no wheezes rales or  rhonci  Abdomen: gravid, nontender  Musculoskeletal: strength 5/5 in four extremities  Neuro: non focal with no numbness, tingling or changes in sensation    SVE: deferred  [place]: Q8 minutes; EFM: 135 with accels.    A/P: Intrauterine pregancy at 35w2d weeks here for elevated BP.    1. Blood pressures within high normal range.  CBC and CMP within normal limits.  Protein to creatinine ratio 202 and subthreshold for preeclampsia.  Discharged home with follow-up in clinic later this week.  2.  Positive leukocyte esterase with bacteria noted on microscopy.  Macrobid sent to pharmacy.  Follow-up in clinic.      DC to home with follow-up for any worsening symptoms.    Bryant Peterson M.D.

## 2021-11-10 LAB
BACTERIA UR CULT: NORMAL
SIGNIFICANT IND 70042: NORMAL
SITE SITE: NORMAL
SOURCE SOURCE: NORMAL

## 2021-11-11 ENCOUNTER — ROUTINE PRENATAL (OUTPATIENT)
Dept: OBGYN | Facility: CLINIC | Age: 26
End: 2021-11-11
Payer: COMMERCIAL

## 2021-11-11 ENCOUNTER — APPOINTMENT (OUTPATIENT)
Dept: OBGYN | Facility: CLINIC | Age: 26
End: 2021-11-11
Payer: COMMERCIAL

## 2021-11-11 ENCOUNTER — HOSPITAL ENCOUNTER (OUTPATIENT)
Facility: MEDICAL CENTER | Age: 26
End: 2021-11-11
Attending: OBSTETRICS & GYNECOLOGY
Payer: MEDICAID

## 2021-11-11 VITALS — WEIGHT: 184.5 LBS | DIASTOLIC BLOOD PRESSURE: 78 MMHG | BODY MASS INDEX: 31.67 KG/M2 | SYSTOLIC BLOOD PRESSURE: 142 MMHG

## 2021-11-11 DIAGNOSIS — O24.419 GESTATIONAL DIABETES MELLITUS (GDM) IN THIRD TRIMESTER, GESTATIONAL DIABETES METHOD OF CONTROL UNSPECIFIED: ICD-10-CM

## 2021-11-11 PROCEDURE — 87081 CULTURE SCREEN ONLY: CPT

## 2021-11-11 PROCEDURE — 90471 IMMUNIZATION ADMIN: CPT | Performed by: OBSTETRICS & GYNECOLOGY

## 2021-11-11 PROCEDURE — 87150 DNA/RNA AMPLIFIED PROBE: CPT

## 2021-11-11 PROCEDURE — 90686 IIV4 VACC NO PRSV 0.5 ML IM: CPT | Performed by: OBSTETRICS & GYNECOLOGY

## 2021-11-11 PROCEDURE — 90040 PR PRENATAL FOLLOW UP: CPT | Performed by: OBSTETRICS & GYNECOLOGY

## 2021-11-11 ASSESSMENT — FIBROSIS 4 INDEX: FIB4 SCORE: 0.44

## 2021-11-11 NOTE — PROGRESS NOTES
Current Insulin regimen  5 units of NPH at bedtime    Reviewed blood sugar log with patient.      Patient # High Erica Targets   Fasting    2  <90   1 hr PP  1 <130-140             # Diabetes in pregnancy  - Cont 5 u NPH at bedtime.   - NSTs: 2x/week to begin at 32 weeks  - delivery at 39 weeks: requested today for 12/4  - growth US: 10/20/2021, two-vessel cord, EFW 2297 g, 79.3%  - repeat glucose testing 6 weeks postpartum

## 2021-11-11 NOTE — NON-PROVIDER
GDM Class A2 . OB F/U.  + fetal movement Active  Denies any VB, LO or UC's  Phone # [phone]  Pharmacy confirmed  Diabetic supplies None   Flu today   No complaints as of today   GBS Today       NDC: 77101-284-01  LOT#: [id number]  Expiration Date: 2022  Dose: 0.5ml  Site: Right Deltoid   Patient educated on use and side effects of medication. Name and  verified prior to injection. Pt tolerated? Well   Verified by LINDSAY  Administered by Tiana Arredondo, Med Ass't at 10:11 AM.  Patient Provided Medication: no

## 2021-11-13 LAB — GP B STREP DNA SPEC QL NAA+PROBE: NEGATIVE

## 2021-11-15 ENCOUNTER — NON-PROVIDER VISIT (OUTPATIENT)
Dept: OBGYN | Facility: CLINIC | Age: 26
End: 2021-11-15
Payer: COMMERCIAL

## 2021-11-15 DIAGNOSIS — O24.419 GESTATIONAL DIABETES MELLITUS (GDM) IN THIRD TRIMESTER, GESTATIONAL DIABETES METHOD OF CONTROL UNSPECIFIED: Primary | ICD-10-CM

## 2021-11-15 LAB
NST ACOUSTIC STIMULATION: NORMAL
NST ACTION NECESSARY: NORMAL
NST ASSESSMENT: NORMAL
NST BASELINE: NORMAL
NST INDICATIONS: NORMAL
NST OTHER DATA: NORMAL
NST READ BY: NORMAL
NST RETURN: NORMAL
NST UTERINE ACTIVITY: NORMAL

## 2021-11-15 PROCEDURE — 59025 FETAL NON-STRESS TEST: CPT | Performed by: NURSE PRACTITIONER

## 2021-11-18 ENCOUNTER — ROUTINE PRENATAL (OUTPATIENT)
Dept: OBGYN | Facility: CLINIC | Age: 26
End: 2021-11-18
Payer: MEDICAID

## 2021-11-18 ENCOUNTER — NON-PROVIDER VISIT (OUTPATIENT)
Dept: OBGYN | Facility: CLINIC | Age: 26
End: 2021-11-18
Payer: MEDICAID

## 2021-11-18 ENCOUNTER — HOSPITAL ENCOUNTER (EMERGENCY)
Facility: MEDICAL CENTER | Age: 26
End: 2021-11-18
Attending: OBSTETRICS & GYNECOLOGY | Admitting: OBSTETRICS & GYNECOLOGY
Payer: MEDICAID

## 2021-11-18 VITALS
HEART RATE: 57 BPM | DIASTOLIC BLOOD PRESSURE: 83 MMHG | TEMPERATURE: 98 F | SYSTOLIC BLOOD PRESSURE: 145 MMHG | BODY MASS INDEX: 31.41 KG/M2 | HEIGHT: 64 IN | RESPIRATION RATE: 14 BRPM | WEIGHT: 184 LBS

## 2021-11-18 VITALS — SYSTOLIC BLOOD PRESSURE: 171 MMHG | BODY MASS INDEX: 31.58 KG/M2 | DIASTOLIC BLOOD PRESSURE: 93 MMHG | WEIGHT: 184 LBS

## 2021-11-18 DIAGNOSIS — O24.414 GESTATIONAL DIABETES MELLITUS (GDM) REQUIRING INSULIN: ICD-10-CM

## 2021-11-18 DIAGNOSIS — O09.90 SUPERVISION OF HIGH RISK PREGNANCY, ANTEPARTUM: Primary | ICD-10-CM

## 2021-11-18 DIAGNOSIS — O16.3 ELEVATED BLOOD PRESSURE COMPLICATING PREGNANCY IN THIRD TRIMESTER, ANTEPARTUM: ICD-10-CM

## 2021-11-18 LAB
ALBUMIN SERPL BCP-MCNC: 3.1 G/DL (ref 3.2–4.9)
ALBUMIN/GLOB SERPL: 0.9 G/DL
ALP SERPL-CCNC: 155 U/L (ref 30–99)
ALT SERPL-CCNC: 12 U/L (ref 2–50)
ANION GAP SERPL CALC-SCNC: 10 MMOL/L (ref 7–16)
AST SERPL-CCNC: 12 U/L (ref 12–45)
BASOPHILS # BLD AUTO: 0.6 % (ref 0–1.8)
BASOPHILS # BLD: 0.04 K/UL (ref 0–0.12)
BILIRUB SERPL-MCNC: <0.2 MG/DL (ref 0.1–1.5)
BUN SERPL-MCNC: 6 MG/DL (ref 8–22)
CALCIUM SERPL-MCNC: 8.3 MG/DL (ref 8.5–10.5)
CHLORIDE SERPL-SCNC: 108 MMOL/L (ref 96–112)
CO2 SERPL-SCNC: 20 MMOL/L (ref 20–33)
CREAT SERPL-MCNC: 0.49 MG/DL (ref 0.5–1.4)
CREAT UR-MCNC: 85.68 MG/DL
EOSINOPHIL # BLD AUTO: 0.08 K/UL (ref 0–0.51)
EOSINOPHIL NFR BLD: 1.1 % (ref 0–6.9)
ERYTHROCYTE [DISTWIDTH] IN BLOOD BY AUTOMATED COUNT: 64.8 FL (ref 35.9–50)
GLOBULIN SER CALC-MCNC: 3.3 G/DL (ref 1.9–3.5)
GLUCOSE SERPL-MCNC: 75 MG/DL (ref 65–99)
HCT VFR BLD AUTO: 34.4 % (ref 37–47)
HGB BLD-MCNC: 11.2 G/DL (ref 12–16)
IMM GRANULOCYTES # BLD AUTO: 0.03 K/UL (ref 0–0.11)
IMM GRANULOCYTES NFR BLD AUTO: 0.4 % (ref 0–0.9)
LYMPHOCYTES # BLD AUTO: 1.3 K/UL (ref 1–4.8)
LYMPHOCYTES NFR BLD: 18.5 % (ref 22–41)
MCH RBC QN AUTO: 30.9 PG (ref 27–33)
MCHC RBC AUTO-ENTMCNC: 32.6 G/DL (ref 33.6–35)
MCV RBC AUTO: 94.8 FL (ref 81.4–97.8)
MONOCYTES # BLD AUTO: 0.37 K/UL (ref 0–0.85)
MONOCYTES NFR BLD AUTO: 5.3 % (ref 0–13.4)
NEUTROPHILS # BLD AUTO: 5.21 K/UL (ref 2–7.15)
NEUTROPHILS NFR BLD: 74.1 % (ref 44–72)
NRBC # BLD AUTO: 0 K/UL
NRBC BLD-RTO: 0 /100 WBC
PLATELET # BLD AUTO: 205 K/UL (ref 164–446)
PMV BLD AUTO: 11.1 FL (ref 9–12.9)
POTASSIUM SERPL-SCNC: 4 MMOL/L (ref 3.6–5.5)
PROT SERPL-MCNC: 6.4 G/DL (ref 6–8.2)
PROT UR-MCNC: 19 MG/DL (ref 0–15)
PROT/CREAT UR: 222 MG/G (ref 10–107)
RBC # BLD AUTO: 3.63 M/UL (ref 4.2–5.4)
SODIUM SERPL-SCNC: 138 MMOL/L (ref 135–145)
URATE SERPL-MCNC: 4.2 MG/DL (ref 1.9–8.2)
WBC # BLD AUTO: 7 K/UL (ref 4.8–10.8)

## 2021-11-18 PROCEDURE — 99283 EMERGENCY DEPT VISIT LOW MDM: CPT

## 2021-11-18 PROCEDURE — 80053 COMPREHEN METABOLIC PANEL: CPT

## 2021-11-18 PROCEDURE — 85025 COMPLETE CBC W/AUTO DIFF WBC: CPT

## 2021-11-18 PROCEDURE — 59025 FETAL NON-STRESS TEST: CPT | Mod: 26 | Performed by: OBSTETRICS & GYNECOLOGY

## 2021-11-18 PROCEDURE — 90040 PR PRENATAL FOLLOW UP: CPT | Performed by: OBSTETRICS & GYNECOLOGY

## 2021-11-18 PROCEDURE — 99283 EMERGENCY DEPT VISIT LOW MDM: CPT | Mod: 25 | Performed by: OBSTETRICS & GYNECOLOGY

## 2021-11-18 PROCEDURE — 36415 COLL VENOUS BLD VENIPUNCTURE: CPT

## 2021-11-18 PROCEDURE — 84156 ASSAY OF PROTEIN URINE: CPT

## 2021-11-18 PROCEDURE — 82570 ASSAY OF URINE CREATININE: CPT

## 2021-11-18 PROCEDURE — 59025 FETAL NON-STRESS TEST: CPT

## 2021-11-18 PROCEDURE — 84550 ASSAY OF BLOOD/URIC ACID: CPT

## 2021-11-18 ASSESSMENT — FIBROSIS 4 INDEX
FIB4 SCORE: 0.44
FIB4 SCORE: 0.44

## 2021-11-18 NOTE — PROGRESS NOTES
26 y.o.    Edc=  36.5 weeks    TOCO and US on.  Pt presents to L&D after being seen in the office for a routine PN visit with a high BP.  She has no obstetrical complaints and denies HA at this time. Initial PN=977/82, will set for q 15 min and labs ordered. 1200-Dr. Navarro reviewed BPs, labs and tracing  [id number] used, Dr. Navarro at BS.  Discussed poc-Due to elevated BPs pt will return for an IOL on Saturday the  at 1000.  Pt is also to return to the hospital if she has an extreme HA or abdominal pain, as well as VB or N/V.  Pt states understanding.

## 2021-11-18 NOTE — DISCHARGE INSTRUCTIONS
General Instructions:  · If you think you are in labor, time contractions (lying on your left side) from the beginning of one contraction to the beginning of the next contraction for at least one hour.  · Increase fluid intake: you should consume 10-12 8 oz glasses of non-caffeinated fluid per day.  · Report any pressure or burning on urination to your physician.  · Monitor fetal movement: If you notice an absence or decrease in fetal movement, drink a large glass of water and rest on your side.  If there is no increase in movement, call your physician or go to the hospital for further evaluation.  · Report any sudden, sharp abdominal pain.  · Report any bleeding.  Spotting or pinkish discharge is normal after vaginal exam.  You may also spot after sexual intercourse.    Hipertensión ludy el embarazo  Hypertension During Pregnancy  La presión arterial holly (hipertensión) se produce cuando la fuerza de la isabel bombea a través de las arterias con mucha fuerza. Las arterias son vasos sanguíneos que transportan la isabel desde el corazón al shruti del cuerpo. La hipertensión ludy el embarazo puede ser leve o grave. La hipertensión grave ludy el embarazo (preeclampsia) es jessica emergencia médica que requiere evaluación y tratamiento con prontitud.  Ludy el embarazo se pueden presentar diferentes tipos de hipertensión. Estos incluyen los siguientes:  Hipertensión crónica. [place] sucede cuando se ha tenido presión arterial holly antes de quedar embarazada y continúa ludy el embarazo. La hipertensión que aparece antes de las 20 semanas de embarazo y continúa ludy el embarazo también se denomina hipertensión crónica. Si tiene hipertensión crónica, no desaparecerá después de tino tenido el bebé. Deberá concurrir a visitas de seguimiento con el médico después de tino tenido el bebé. El médico podrá indicarle que siga recibiendo medicamentos para la presión arterial.  Hipertensión gestacional. Esta hipertensión  se desarrolla después de la semana 20 de embarazo. La hipertensión gestacional por lo general desaparece después de tener el bebé, deysi el médico deberá controlar la presión arterial para asegurarse de que esté mejorando.  Preeclampsia. Se produce cuando se desarrolla hipertensión grave ludy el embarazo. [place] puede causar complicaciones graves para usted y el bebé, y también puede causarle complicaciones después del parto.  Preeclampsia posparto. Usted puede desarrollar hipertensión grave después de mildred a cony. [place] normalmente ocurre dentro de las 48 horas después del nacimiento del bebé, deysi puede aparecer hasta 6 semanas después de mildred a cony. [place] es poco frecuente.  ¿De qué modo me afecta?  Las mujeres que sufren de hipertensión ludy el embarazo tienen jessica mayor probabilidad de desarrollar hipertensión en etapas posteriores de la gee o en embarazos futuros. En algunos casos, la hipertensión ludy el embarazo puede causar complicaciones graves, yaniv:  Accidente cerebrovascular.  Infarto de miocardio.  Lesiones en otros órganos, yaniv riñones, pulmones o hígado.  Preeclampsia.  Convulsiones.  Desprendimiento de la placenta.  ¿Cómo afecta esto al bebé?  La hipertensión ludy el embarazo puede afectar al bebé. El bebé puede:  Nacer en forma temprana (prematuramente).  No pesar tanto yaniv debería al nacer (bajo peso al nacer).  No tolerar alonso el trabajo de parto, lo que deriva en un parto por cesárea no planeado.  ¿Cuáles son los riesgos?  Existen ciertos factores que aumentan las probabilidades de que desarrolle hipertensión ludy el embarazo. Estos incluyen los siguientes:  [place] sufrido hipertensión arterial ludy un embarazo anterior.  Tener sobrepeso.  Tener 35 años o más.  Estar embarazada por primera vez.  Estar embarazada de más de un bebé.  Embarazarse a través de métodos de fertilización yaniv FIV (fertilización in vitro).  Tener otros problemas médicos, yaniv diabetes, enfermedad renal o  lupus.  Tener antecedentes familiares de hipertensión.  ¿Qué puedo hacer para disminuir el riesgo?  Se desconoce la causa exacta de la hipertensión ludy el embarazo. Es posible que pueda reducir mares riesgo al hacer lo siguiente:  Mantener un peso saludable.  Consumir jessica dieta saludable y equilibrada.  Seguir las indicaciones del médico acerca de tratar cualquier afección a kaitlynn plazo que haya tenido antes de quedar embarazada.  Es muy importante que concurra a todas las visitas de cuidado prenatal. El médico le controlará la presión arterial y se asegurará de que el embarazo esté progresando según lo previsto. Si se detecta un problema, el tratamiento temprano puede evitar complicaciones.  ¿Cómo se trata?  El tratamiento para la hipertensión ludy el embarazo difiere según el tipo de hipertensión que tiene y de mares gravedad.  Si estuvo recibiendo medicamentos para la presión arterial holly antes de quedar embarazada, hable con el médico. Es posible que deba cambiar los medicamentos ludy el embarazo porque algunos medicamentos, yaniv los inhibidores de la enzima convertidora de angiotensina (IECA), pueden no ser considerados seguros para el bebé.  Si tiene hipertensión gestacional, el médico puede indicarle medicamentos para tratar esto ludy el embarazo.  Si está en riesgo de padecer preeclampsia, mares médico puede recomendarle que tome jessica dosis baja de aspirina ludy el embarazo.  Si tiene hipertensión grave, es posible que deba hospitalizarse para que usted y el bebé puedan ser controlados atentamente. Es posible que deba recibir un medicamento para disminuir la presión arterial. Doretha medicamento se puede administrar por boca o a través de jessica vía intravenosa.  En ciertos casos, si mares afección empeora, es posible que necesite mildred a cony de forma más temprana.  Siga estas instrucciones en mares casa:  Comida y bebida    Lisa suficiente líquido yaniv para mantener la orina de color amarillo pálido.  Evite la  cafeína.  Estilo de gee  No consuma ningún producto que contenga nicotina o tabaco, yaniv cigarrillos, cigarrillos electrónicos y tabaco de mascar. Si necesita ayuda para dejar de fumar, consulte al médico.  No consuma drogas ni alcohol.  Evite las situaciones estresantes tanto yaniv sea posible.  [place] reposo y duerma alonso.  El ejercicio regular puede ayudar a reducir la presión arterial. Consulte al médico qué tipos de ejercicios son seguros para usted.  Indicaciones generales  Use los medicamentos de venta lambert y los recetados solamente yaniv se lo haya indicado el médico.  Concurra a todas las visitas de control prenatales y de seguimiento yaniv se lo haya indicado el médico. [place] es importante.  Comuníquese con un médico si:  Tiene síntomas que mares médico le ha informado que pueden requerir más tratamiento o control, por ejemplo:  Alicia de ladan.  Náuseas o vómitos.  Dolor abdominal.  Mareos.  Desvanecimiento.  Solicite ayuda inmediatamente si:  Tiene lo siguiente:  Dolor abdominal intenso que no mejora con el tratamiento.  Dolor de ladan intenso que no mejora.  Vómitos que no mejoran.  Aumento de peso repentino y rápido.  [place]inchazón repentina en las urbano, los tobillos o el sukh.  Sangrado vaginal.  Presenta isabel en la orina.  Visión borrosa o doble.  Siente que le falta el aire o dolor en el pecho.  Debilidad en un lado del cuerpo.  Dificultad para hablar.  El bebé no se mueve tanto yaniv sería usual.  Resumen  La presión arterial holly (hipertensión) se produce cuando la fuerza de la isabel bombea a través de las arterias con mucha fuerza.  La hipertensión uldy el embarazo puede causar problemas para usted y el bebé.  El tratamiento para la hipertensión ludy el embarazo difiere según el tipo de hipertensión que tiene y de mares gravedad.  Concurra a todas las visitas de control prenatales y de seguimiento yanvi se lo haya indicado el médico. [place] es importante.  Esta información no tiene yaniv fin  reemplazar el consejo del médico. Asegúrese de hacerle al médico cualquier pregunta que tenga.  Document Released: 12/06/2012 Document Revised: 02/14/2020 Document Reviewed: 02/14/2020  Elsevier Patient Education © 2020 Elsevier Inc.    High Blood Pressure:  · Rest on your right or left side.  · Report any severe headaches, dizziness, blurred vision or spots before your eyes.  · Report excessive swelling of your hands, face or feet.  · Report epigastric pain (upper abdominal pain)      Other Instructions:  Please carefully review your entire AFTER VISIT SUMMARY document for all discharge instructions.

## 2021-11-18 NOTE — ED PROVIDER NOTES
"S: Pt is a 26 y.o.  at 36w5d with Estimated Date of Delivery: 21 who presents to triage with elevated blood pressures.  She was at her regularly scheduled diabetic visit and was on the nonstress test when her first blood pressure was 171/93.  She was then sent immediately over for evaluation.  She denies any headache, blurry vision, right upper quadrant pain, and states normal fetal movement.  She is on NPH at bedtime and takes 5units    O: /83   Pulse (!) 57   Temp 36.7 °C (98 °F) (Temporal)   Resp 14   Ht 1.626 m (5' 4\")   Wt 83.5 kg (184 lb)   LMP 03/10/2021 (Approximate)   BMI 31.58 kg/m²          NST: Done and read by me         Indication: Hypertension in pregnancy       FHR: 145       Variability: Moderate       Acclerations: Yes 15 x 15       Decelerations: No       Reactive: Yes         [place]: Irregular UCs       SVE: Deferred  GEN: AAO in no acute distress  Abdomen: Gravid, size greater than dates, nontender at the fundus    Lab Results   Component Value Date/Time    WBC 7.0 2021 10:39 AM    RBC 3.63 (L) 2021 10:39 AM    HEMOGLOBIN 11.2 (L) 2021 10:39 AM    HEMATOCRIT 34.4 (L) 2021 10:39 AM    MCV 94.8 2021 10:39 AM    MCH 30.9 2021 10:39 AM    MCHC 32.6 (L) 2021 10:39 AM    MPV 11.1 2021 10:39 AM    NEUTSPOLYS 74.10 (H) 2021 10:39 AM    LYMPHOCYTES 18.50 (L) 2021 10:39 AM    MONOCYTES 5.30 2021 10:39 AM    EOSINOPHILS 1.10 2021 10:39 AM    BASOPHILS 0.60 2021 10:39 AM      Lab Results   Component Value Date/Time    SODIUM 138 2021 10:39 AM    POTASSIUM 4.0 2021 10:39 AM    CHLORIDE 108 2021 10:39 AM    CO2 20 2021 10:39 AM    GLUCOSE 75 2021 10:39 AM    BUN 6 (L) 2021 10:39 AM    CREATININE 0.49 (L) 2021 10:39 AM      PC ratio 222         A/P  Patient Active Problem List    Diagnosis Date Noted   • Macrosomia 2021   • GDM, class A2 2021   • Anemia in " pregnancy 10/20/2021   • 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  IUP @ 36w5d here with gestational hypertension.  Patient was booked for an induction of labor at 37 and 0 weeks.  This was discussed in detail with the patient as well as recommendation for continued insulin therapy.  Preeclampsia precautions discussed with the patient.  Labor precautions discussed with the patient.  She will arrive for her appointment for induction 1 hour prior on 11/20/2021.    Evaluation and clinical decision making , including analysis of Fetal data and maternal lab work completed over a 1 hour period.       Jermaine Navarro DO

## 2021-11-20 ENCOUNTER — APPOINTMENT (OUTPATIENT)
Dept: OBGYN | Facility: MEDICAL CENTER | Age: 26
End: 2021-11-20
Attending: OBSTETRICS & GYNECOLOGY
Payer: COMMERCIAL

## 2021-11-20 ENCOUNTER — HOSPITAL ENCOUNTER (INPATIENT)
Facility: MEDICAL CENTER | Age: 26
LOS: 5 days | End: 2021-11-25
Attending: OBSTETRICS & GYNECOLOGY | Admitting: OBSTETRICS & GYNECOLOGY
Payer: COMMERCIAL

## 2021-11-20 LAB
ALBUMIN SERPL BCP-MCNC: 3.2 G/DL (ref 3.2–4.9)
ALBUMIN/GLOB SERPL: 0.9 G/DL
ALP SERPL-CCNC: 161 U/L (ref 30–99)
ALT SERPL-CCNC: 17 U/L (ref 2–50)
ALT SERPL-CCNC: 17 U/L (ref 2–50)
ANION GAP SERPL CALC-SCNC: 12 MMOL/L (ref 7–16)
ANISOCYTOSIS BLD QL SMEAR: ABNORMAL
AST SERPL-CCNC: 12 U/L (ref 12–45)
AST SERPL-CCNC: 12 U/L (ref 12–45)
BASOPHILS # BLD AUTO: 0 % (ref 0–1.8)
BASOPHILS # BLD: 0 K/UL (ref 0–0.12)
BILIRUB SERPL-MCNC: 0.2 MG/DL (ref 0.1–1.5)
BUN SERPL-MCNC: 8 MG/DL (ref 8–22)
CALCIUM SERPL-MCNC: 8.7 MG/DL (ref 8.5–10.5)
CHLORIDE SERPL-SCNC: 107 MMOL/L (ref 96–112)
CO2 SERPL-SCNC: 17 MMOL/L (ref 20–33)
CREAT SERPL-MCNC: 0.48 MG/DL (ref 0.5–1.4)
CREAT SERPL-MCNC: 0.48 MG/DL (ref 0.5–1.4)
CREAT UR-MCNC: 124.05 MG/DL
EOSINOPHIL # BLD AUTO: 0 K/UL (ref 0–0.51)
EOSINOPHIL NFR BLD: 0 % (ref 0–6.9)
ERYTHROCYTE [DISTWIDTH] IN BLOOD BY AUTOMATED COUNT: 65.3 FL (ref 35.9–50)
ERYTHROCYTE [DISTWIDTH] IN BLOOD BY AUTOMATED COUNT: 65.5 FL (ref 35.9–50)
GLOBULIN SER CALC-MCNC: 3.5 G/DL (ref 1.9–3.5)
GLUCOSE BLD-MCNC: 76 MG/DL (ref 65–99)
GLUCOSE BLD-MCNC: 97 MG/DL (ref 65–99)
GLUCOSE SERPL-MCNC: 74 MG/DL (ref 65–99)
HCT VFR BLD AUTO: 34.3 % (ref 37–47)
HCT VFR BLD AUTO: 35.1 % (ref 37–47)
HGB BLD-MCNC: 11.1 G/DL (ref 12–16)
HGB BLD-MCNC: 11.4 G/DL (ref 12–16)
HOLDING TUBE BB 8507: NORMAL
LYMPHOCYTES # BLD AUTO: 0.61 K/UL (ref 1–4.8)
LYMPHOCYTES NFR BLD: 8.9 % (ref 22–41)
MACROCYTES BLD QL SMEAR: ABNORMAL
MANUAL DIFF BLD: NORMAL
MCH RBC QN AUTO: 30.6 PG (ref 27–33)
MCH RBC QN AUTO: 30.8 PG (ref 27–33)
MCHC RBC AUTO-ENTMCNC: 32.4 G/DL (ref 33.6–35)
MCHC RBC AUTO-ENTMCNC: 32.5 G/DL (ref 33.6–35)
MCV RBC AUTO: 94.5 FL (ref 81.4–97.8)
MCV RBC AUTO: 94.9 FL (ref 81.4–97.8)
MICROCYTES BLD QL SMEAR: ABNORMAL
MONOCYTES # BLD AUTO: 0.18 K/UL (ref 0–0.85)
MONOCYTES NFR BLD AUTO: 2.7 % (ref 0–13.4)
MORPHOLOGY BLD-IMP: NORMAL
NEUTROPHILS # BLD AUTO: 6.01 K/UL (ref 2–7.15)
NEUTROPHILS NFR BLD: 88.4 % (ref 44–72)
NRBC # BLD AUTO: 0 K/UL
NRBC BLD-RTO: 0 /100 WBC
PLATELET # BLD AUTO: 222 K/UL (ref 164–446)
PLATELET # BLD AUTO: 225 K/UL (ref 164–446)
PLATELET BLD QL SMEAR: NORMAL
PMV BLD AUTO: 11.4 FL (ref 9–12.9)
PMV BLD AUTO: 11.8 FL (ref 9–12.9)
POTASSIUM SERPL-SCNC: 4 MMOL/L (ref 3.6–5.5)
PROT SERPL-MCNC: 6.7 G/DL (ref 6–8.2)
PROT UR-MCNC: 24 MG/DL (ref 0–15)
RBC # BLD AUTO: 3.63 M/UL (ref 4.2–5.4)
RBC # BLD AUTO: 3.7 M/UL (ref 4.2–5.4)
RBC BLD AUTO: PRESENT
SODIUM SERPL-SCNC: 136 MMOL/L (ref 135–145)
WBC # BLD AUTO: 6.8 K/UL (ref 4.8–10.8)
WBC # BLD AUTO: 7.5 K/UL (ref 4.8–10.8)

## 2021-11-20 PROCEDURE — 700111 HCHG RX REV CODE 636 W/ 250 OVERRIDE (IP): Performed by: NURSE PRACTITIONER

## 2021-11-20 PROCEDURE — 700102 HCHG RX REV CODE 250 W/ 637 OVERRIDE(OP): Performed by: OBSTETRICS & GYNECOLOGY

## 2021-11-20 PROCEDURE — 85007 BL SMEAR W/DIFF WBC COUNT: CPT

## 2021-11-20 PROCEDURE — 82962 GLUCOSE BLOOD TEST: CPT | Mod: 91

## 2021-11-20 PROCEDURE — 36415 COLL VENOUS BLD VENIPUNCTURE: CPT

## 2021-11-20 PROCEDURE — 84156 ASSAY OF PROTEIN URINE: CPT

## 2021-11-20 PROCEDURE — 700111 HCHG RX REV CODE 636 W/ 250 OVERRIDE (IP)

## 2021-11-20 PROCEDURE — 700111 HCHG RX REV CODE 636 W/ 250 OVERRIDE (IP): Performed by: OBSTETRICS & GYNECOLOGY

## 2021-11-20 PROCEDURE — 82570 ASSAY OF URINE CREATININE: CPT

## 2021-11-20 PROCEDURE — 770002 HCHG ROOM/CARE - OB PRIVATE (112)

## 2021-11-20 PROCEDURE — 700105 HCHG RX REV CODE 258: Performed by: NURSE PRACTITIONER

## 2021-11-20 PROCEDURE — 3E033VJ INTRODUCTION OF OTHER HORMONE INTO PERIPHERAL VEIN, PERCUTANEOUS APPROACH: ICD-10-PCS | Performed by: OBSTETRICS & GYNECOLOGY

## 2021-11-20 PROCEDURE — 85027 COMPLETE CBC AUTOMATED: CPT | Mod: 91

## 2021-11-20 PROCEDURE — 80053 COMPREHEN METABOLIC PANEL: CPT

## 2021-11-20 PROCEDURE — A9270 NON-COVERED ITEM OR SERVICE: HCPCS | Performed by: OBSTETRICS & GYNECOLOGY

## 2021-11-20 RX ORDER — LABETALOL 100 MG/1
200 TABLET, FILM COATED ORAL EVERY 8 HOURS
Status: DISCONTINUED | OUTPATIENT
Start: 2021-11-20 | End: 2021-11-24

## 2021-11-20 RX ORDER — FERROUS SULFATE 325(65) MG
325 TABLET ORAL DAILY
COMMUNITY
End: 2021-12-08

## 2021-11-20 RX ORDER — MAGNESIUM SULFATE HEPTAHYDRATE 40 MG/ML
2 INJECTION, SOLUTION INTRAVENOUS CONTINUOUS
Status: DISCONTINUED | OUTPATIENT
Start: 2021-11-20 | End: 2021-11-22

## 2021-11-20 RX ORDER — MAGNESIUM SULFATE HEPTAHYDRATE 40 MG/ML
INJECTION, SOLUTION INTRAVENOUS
Status: COMPLETED
Start: 2021-11-20 | End: 2021-11-20

## 2021-11-20 RX ORDER — NIFEDIPINE 10 MG/1
10 CAPSULE ORAL ONCE
Status: COMPLETED | OUTPATIENT
Start: 2021-11-20 | End: 2021-11-20

## 2021-11-20 RX ORDER — CALCIUM GLUCONATE 94 MG/ML
1 INJECTION, SOLUTION INTRAVENOUS
Status: DISCONTINUED | OUTPATIENT
Start: 2021-11-20 | End: 2021-11-22

## 2021-11-20 RX ORDER — HYDRALAZINE HYDROCHLORIDE 20 MG/ML
INJECTION INTRAMUSCULAR; INTRAVENOUS
Status: DISCONTINUED
Start: 2021-11-20 | End: 2021-11-20

## 2021-11-20 RX ORDER — MAGNESIUM SULFATE HEPTAHYDRATE 40 MG/ML
6 INJECTION, SOLUTION INTRAVENOUS ONCE
Status: COMPLETED | OUTPATIENT
Start: 2021-11-20 | End: 2021-11-20

## 2021-11-20 RX ADMIN — MAGNESIUM SULFATE HEPTAHYDRATE 6 G: 40 INJECTION, SOLUTION INTRAVENOUS at 18:22

## 2021-11-20 RX ADMIN — MAGNESIUM SULFATE HEPTAHYDRATE 2 G/HR: 40 INJECTION, SOLUTION INTRAVENOUS at 19:00

## 2021-11-20 RX ADMIN — OXYTOCIN 2 MILLI-UNITS/MIN: 10 INJECTION, SOLUTION INTRAMUSCULAR; INTRAVENOUS at 21:51

## 2021-11-20 RX ADMIN — NIFEDIPINE 10 MG: 10 CAPSULE ORAL at 17:47

## 2021-11-20 RX ADMIN — LABETALOL HYDROCHLORIDE 200 MG: 100 TABLET, FILM COATED ORAL at 20:01

## 2021-11-20 ASSESSMENT — PAIN SCALES - GENERAL: PAINLEVEL: 0 - NO PAIN

## 2021-11-20 ASSESSMENT — FIBROSIS 4 INDEX: FIB4 SCORE: 0.44

## 2021-11-20 ASSESSMENT — PATIENT HEALTH QUESTIONNAIRE - PHQ9
SUM OF ALL RESPONSES TO PHQ9 QUESTIONS 1 AND 2: 0
SUM OF ALL RESPONSES TO PHQ9 QUESTIONS 1 AND 2: 0
1. LITTLE INTEREST OR PLEASURE IN DOING THINGS: NOT AT ALL
1. LITTLE INTEREST OR PLEASURE IN DOING THINGS: NOT AT ALL
2. FEELING DOWN, DEPRESSED, IRRITABLE, OR HOPELESS: NOT AT ALL
2. FEELING DOWN, DEPRESSED, IRRITABLE, OR HOPELESS: NOT AT ALL

## 2021-11-20 ASSESSMENT — LIFESTYLE VARIABLES
EVER_SMOKED: NEVER
ALCOHOL_USE: NO

## 2021-11-21 ENCOUNTER — ANESTHESIA (OUTPATIENT)
Dept: OBGYN | Facility: MEDICAL CENTER | Age: 26
End: 2021-11-21
Payer: COMMERCIAL

## 2021-11-21 ENCOUNTER — ANESTHESIA EVENT (OUTPATIENT)
Dept: OBGYN | Facility: MEDICAL CENTER | Age: 26
End: 2021-11-21
Payer: COMMERCIAL

## 2021-11-21 LAB
ALBUMIN SERPL BCP-MCNC: 3.2 G/DL (ref 3.2–4.9)
ALBUMIN/GLOB SERPL: 1.1 G/DL
ALP SERPL-CCNC: 152 U/L (ref 30–99)
ALT SERPL-CCNC: 12 U/L (ref 2–50)
ANION GAP SERPL CALC-SCNC: 15 MMOL/L (ref 7–16)
AST SERPL-CCNC: 15 U/L (ref 12–45)
BILIRUB SERPL-MCNC: 0.3 MG/DL (ref 0.1–1.5)
BUN SERPL-MCNC: 8 MG/DL (ref 8–22)
CALCIUM SERPL-MCNC: 6.8 MG/DL (ref 8.5–10.5)
CHLORIDE SERPL-SCNC: 99 MMOL/L (ref 96–112)
CO2 SERPL-SCNC: 17 MMOL/L (ref 20–33)
CREAT SERPL-MCNC: 0.69 MG/DL (ref 0.5–1.4)
GLOBULIN SER CALC-MCNC: 3 G/DL (ref 1.9–3.5)
GLUCOSE BLD-MCNC: 100 MG/DL (ref 65–99)
GLUCOSE BLD-MCNC: 102 MG/DL (ref 65–99)
GLUCOSE BLD-MCNC: 109 MG/DL (ref 65–99)
GLUCOSE BLD-MCNC: 119 MG/DL (ref 65–99)
GLUCOSE BLD-MCNC: 87 MG/DL (ref 65–99)
GLUCOSE BLD-MCNC: 92 MG/DL (ref 65–99)
GLUCOSE BLD-MCNC: 94 MG/DL (ref 65–99)
GLUCOSE BLD-MCNC: 97 MG/DL (ref 65–99)
GLUCOSE SERPL-MCNC: 174 MG/DL (ref 65–99)
MAGNESIUM SERPL-MCNC: 6.1 MG/DL (ref 1.5–2.5)
MAGNESIUM SERPL-MCNC: 6.2 MG/DL (ref 1.5–2.5)
POTASSIUM SERPL-SCNC: 4.1 MMOL/L (ref 3.6–5.5)
PROT SERPL-MCNC: 6.2 G/DL (ref 6–8.2)
SARS-COV+SARS-COV-2 AG RESP QL IA.RAPID: NOTDETECTED
SODIUM SERPL-SCNC: 131 MMOL/L (ref 135–145)
SPECIMEN SOURCE: NORMAL

## 2021-11-21 PROCEDURE — 160009 HCHG ANES TIME/MIN: Performed by: OBSTETRICS & GYNECOLOGY

## 2021-11-21 PROCEDURE — 160029 HCHG SURGERY MINUTES - 1ST 30 MINS LEVEL 4: Performed by: OBSTETRICS & GYNECOLOGY

## 2021-11-21 PROCEDURE — 700105 HCHG RX REV CODE 258: Performed by: ANESTHESIOLOGY

## 2021-11-21 PROCEDURE — 82962 GLUCOSE BLOOD TEST: CPT | Mod: 91

## 2021-11-21 PROCEDURE — 770002 HCHG ROOM/CARE - OB PRIVATE (112)

## 2021-11-21 PROCEDURE — 59514 CESAREAN DELIVERY ONLY: CPT | Performed by: OBSTETRICS & GYNECOLOGY

## 2021-11-21 PROCEDURE — A9270 NON-COVERED ITEM OR SERVICE: HCPCS | Performed by: ANESTHESIOLOGY

## 2021-11-21 PROCEDURE — 700111 HCHG RX REV CODE 636 W/ 250 OVERRIDE (IP): Performed by: ANESTHESIOLOGY

## 2021-11-21 PROCEDURE — 87426 SARSCOV CORONAVIRUS AG IA: CPT

## 2021-11-21 PROCEDURE — 160041 HCHG SURGERY MINUTES - EA ADDL 1 MIN LEVEL 4: Performed by: OBSTETRICS & GYNECOLOGY

## 2021-11-21 PROCEDURE — 36415 COLL VENOUS BLD VENIPUNCTURE: CPT

## 2021-11-21 PROCEDURE — 700111 HCHG RX REV CODE 636 W/ 250 OVERRIDE (IP): Performed by: OBSTETRICS & GYNECOLOGY

## 2021-11-21 PROCEDURE — 160048 HCHG OR STATISTICAL LEVEL 1-5: Performed by: OBSTETRICS & GYNECOLOGY

## 2021-11-21 PROCEDURE — 700111 HCHG RX REV CODE 636 W/ 250 OVERRIDE (IP)

## 2021-11-21 PROCEDURE — 160035 HCHG PACU - 1ST 60 MINS PHASE I: Performed by: OBSTETRICS & GYNECOLOGY

## 2021-11-21 PROCEDURE — 700102 HCHG RX REV CODE 250 W/ 637 OVERRIDE(OP): Performed by: ANESTHESIOLOGY

## 2021-11-21 PROCEDURE — 303615 HCHG EPIDURAL/SPINAL ANESTHESIA FOR LABOR

## 2021-11-21 PROCEDURE — 700101 HCHG RX REV CODE 250: Performed by: ANESTHESIOLOGY

## 2021-11-21 PROCEDURE — A9270 NON-COVERED ITEM OR SERVICE: HCPCS | Performed by: OBSTETRICS & GYNECOLOGY

## 2021-11-21 PROCEDURE — 160002 HCHG RECOVERY MINUTES (STAT): Performed by: OBSTETRICS & GYNECOLOGY

## 2021-11-21 PROCEDURE — 88307 TISSUE EXAM BY PATHOLOGIST: CPT

## 2021-11-21 PROCEDURE — 80053 COMPREHEN METABOLIC PANEL: CPT

## 2021-11-21 PROCEDURE — 160036 HCHG PACU - EA ADDL 30 MINS PHASE I: Performed by: OBSTETRICS & GYNECOLOGY

## 2021-11-21 PROCEDURE — 700102 HCHG RX REV CODE 250 W/ 637 OVERRIDE(OP): Performed by: OBSTETRICS & GYNECOLOGY

## 2021-11-21 PROCEDURE — 83735 ASSAY OF MAGNESIUM: CPT

## 2021-11-21 RX ORDER — HYDROMORPHONE HYDROCHLORIDE 1 MG/ML
0.5 INJECTION, SOLUTION INTRAMUSCULAR; INTRAVENOUS; SUBCUTANEOUS
Status: CANCELLED | OUTPATIENT
Start: 2021-11-21

## 2021-11-21 RX ORDER — AZITHROMYCIN 500 MG/5ML
INJECTION, POWDER, LYOPHILIZED, FOR SOLUTION INTRAVENOUS
Status: ACTIVE
Start: 2021-11-21 | End: 2021-11-22

## 2021-11-21 RX ORDER — METOCLOPRAMIDE HYDROCHLORIDE 5 MG/ML
10 INJECTION INTRAMUSCULAR; INTRAVENOUS ONCE
Status: COMPLETED | OUTPATIENT
Start: 2021-11-21 | End: 2021-11-21

## 2021-11-21 RX ORDER — SODIUM CHLORIDE, SODIUM LACTATE, POTASSIUM CHLORIDE, CALCIUM CHLORIDE 600; 310; 30; 20 MG/100ML; MG/100ML; MG/100ML; MG/100ML
INJECTION, SOLUTION INTRAVENOUS CONTINUOUS
Status: CANCELLED | OUTPATIENT
Start: 2021-11-21

## 2021-11-21 RX ORDER — ONDANSETRON 2 MG/ML
4 INJECTION INTRAMUSCULAR; INTRAVENOUS
Status: CANCELLED | OUTPATIENT
Start: 2021-11-21

## 2021-11-21 RX ORDER — SODIUM CHLORIDE, SODIUM LACTATE, POTASSIUM CHLORIDE, AND CALCIUM CHLORIDE .6; .31; .03; .02 G/100ML; G/100ML; G/100ML; G/100ML
1000 INJECTION, SOLUTION INTRAVENOUS
Status: DISCONTINUED | OUTPATIENT
Start: 2021-11-21 | End: 2021-11-21

## 2021-11-21 RX ORDER — ROPIVACAINE HYDROCHLORIDE 2 MG/ML
INJECTION, SOLUTION EPIDURAL; INFILTRATION; PERINEURAL CONTINUOUS
Status: DISCONTINUED | OUTPATIENT
Start: 2021-11-21 | End: 2021-11-25

## 2021-11-21 RX ORDER — CARBOPROST TROMETHAMINE 250 UG/ML
INJECTION, SOLUTION INTRAMUSCULAR
Status: DISPENSED
Start: 2021-11-21 | End: 2021-11-22

## 2021-11-21 RX ORDER — SODIUM CHLORIDE, SODIUM LACTATE, POTASSIUM CHLORIDE, AND CALCIUM CHLORIDE .6; .31; .03; .02 G/100ML; G/100ML; G/100ML; G/100ML
1000 INJECTION, SOLUTION INTRAVENOUS
Status: DISCONTINUED | OUTPATIENT
Start: 2021-11-21 | End: 2021-11-22 | Stop reason: HOSPADM

## 2021-11-21 RX ORDER — DEXAMETHASONE SODIUM PHOSPHATE 4 MG/ML
INJECTION, SOLUTION INTRA-ARTICULAR; INTRALESIONAL; INTRAMUSCULAR; INTRAVENOUS; SOFT TISSUE PRN
Status: DISCONTINUED | OUTPATIENT
Start: 2021-11-21 | End: 2021-11-21 | Stop reason: SURG

## 2021-11-21 RX ORDER — CITRIC ACID/SODIUM CITRATE 334-500MG
30 SOLUTION, ORAL ORAL ONCE
Status: COMPLETED | OUTPATIENT
Start: 2021-11-21 | End: 2021-11-21

## 2021-11-21 RX ORDER — MORPHINE SULFATE 0.5 MG/ML
INJECTION, SOLUTION EPIDURAL; INTRATHECAL; INTRAVENOUS PRN
Status: DISCONTINUED | OUTPATIENT
Start: 2021-11-21 | End: 2021-11-21 | Stop reason: SURG

## 2021-11-21 RX ORDER — HALOPERIDOL 5 MG/ML
1 INJECTION INTRAMUSCULAR
Status: CANCELLED | OUTPATIENT
Start: 2021-11-21

## 2021-11-21 RX ORDER — OXYTOCIN 10 [USP'U]/ML
INJECTION, SOLUTION INTRAMUSCULAR; INTRAVENOUS PRN
Status: DISCONTINUED | OUTPATIENT
Start: 2021-11-21 | End: 2021-11-21 | Stop reason: SURG

## 2021-11-21 RX ORDER — BUPIVACAINE HYDROCHLORIDE 2.5 MG/ML
INJECTION, SOLUTION EPIDURAL; INFILTRATION; INTRACAUDAL PRN
Status: DISCONTINUED | OUTPATIENT
Start: 2021-11-21 | End: 2021-11-21 | Stop reason: SURG

## 2021-11-21 RX ORDER — AZITHROMYCIN 250 MG/1
500 TABLET, FILM COATED ORAL ONCE
Status: DISCONTINUED | OUTPATIENT
Start: 2021-11-21 | End: 2021-11-21

## 2021-11-21 RX ORDER — BUPIVACAINE HYDROCHLORIDE 2.5 MG/ML
INJECTION, SOLUTION EPIDURAL; INFILTRATION; INTRACAUDAL
Status: COMPLETED
Start: 2021-11-21 | End: 2021-11-21

## 2021-11-21 RX ORDER — ONDANSETRON 2 MG/ML
INJECTION INTRAMUSCULAR; INTRAVENOUS PRN
Status: DISCONTINUED | OUTPATIENT
Start: 2021-11-21 | End: 2021-11-21 | Stop reason: SURG

## 2021-11-21 RX ORDER — HYDROMORPHONE HYDROCHLORIDE 1 MG/ML
0.4 INJECTION, SOLUTION INTRAMUSCULAR; INTRAVENOUS; SUBCUTANEOUS
Status: CANCELLED | OUTPATIENT
Start: 2021-11-21

## 2021-11-21 RX ORDER — LIDOCAINE HCL/EPINEPHRINE/PF 2%-1:200K
VIAL (ML) INJECTION PRN
Status: DISCONTINUED | OUTPATIENT
Start: 2021-11-21 | End: 2021-11-21 | Stop reason: SURG

## 2021-11-21 RX ORDER — DIPHENHYDRAMINE HYDROCHLORIDE 50 MG/ML
12.5 INJECTION INTRAMUSCULAR; INTRAVENOUS
Status: CANCELLED | OUTPATIENT
Start: 2021-11-21

## 2021-11-21 RX ORDER — OXYCODONE HCL 5 MG/5 ML
5 SOLUTION, ORAL ORAL
Status: CANCELLED | OUTPATIENT
Start: 2021-11-21

## 2021-11-21 RX ORDER — PHENYLEPHRINE HCL IN 0.9% NACL 0.5 MG/5ML
SYRINGE (ML) INTRAVENOUS PRN
Status: DISCONTINUED | OUTPATIENT
Start: 2021-11-21 | End: 2021-11-21 | Stop reason: SURG

## 2021-11-21 RX ORDER — ROPIVACAINE HYDROCHLORIDE 2 MG/ML
INJECTION, SOLUTION EPIDURAL; INFILTRATION; PERINEURAL CONTINUOUS
Status: DISCONTINUED | OUTPATIENT
Start: 2021-11-21 | End: 2021-11-21

## 2021-11-21 RX ORDER — SCOLOPAMINE TRANSDERMAL SYSTEM 1 MG/1
PATCH, EXTENDED RELEASE TRANSDERMAL PRN
Status: DISCONTINUED | OUTPATIENT
Start: 2021-11-21 | End: 2021-11-21 | Stop reason: SURG

## 2021-11-21 RX ORDER — OXYCODONE HCL 5 MG/5 ML
10 SOLUTION, ORAL ORAL
Status: CANCELLED | OUTPATIENT
Start: 2021-11-21

## 2021-11-21 RX ORDER — SODIUM CHLORIDE, SODIUM LACTATE, POTASSIUM CHLORIDE, AND CALCIUM CHLORIDE .6; .31; .03; .02 G/100ML; G/100ML; G/100ML; G/100ML
250 INJECTION, SOLUTION INTRAVENOUS PRN
Status: DISCONTINUED | OUTPATIENT
Start: 2021-11-21 | End: 2021-11-22 | Stop reason: HOSPADM

## 2021-11-21 RX ORDER — SODIUM CHLORIDE, SODIUM GLUCONATE, SODIUM ACETATE, POTASSIUM CHLORIDE AND MAGNESIUM CHLORIDE 526; 502; 368; 37; 30 MG/100ML; MG/100ML; MG/100ML; MG/100ML; MG/100ML
1500 INJECTION, SOLUTION INTRAVENOUS ONCE
Status: COMPLETED | OUTPATIENT
Start: 2021-11-21 | End: 2021-11-21

## 2021-11-21 RX ORDER — CEFAZOLIN SODIUM 1 G/3ML
INJECTION, POWDER, FOR SOLUTION INTRAMUSCULAR; INTRAVENOUS PRN
Status: DISCONTINUED | OUTPATIENT
Start: 2021-11-21 | End: 2021-11-21 | Stop reason: SURG

## 2021-11-21 RX ORDER — MEPERIDINE HYDROCHLORIDE 25 MG/ML
12.5 INJECTION INTRAMUSCULAR; INTRAVENOUS; SUBCUTANEOUS
Status: CANCELLED | OUTPATIENT
Start: 2021-11-21

## 2021-11-21 RX ORDER — ROPIVACAINE HYDROCHLORIDE 2 MG/ML
INJECTION, SOLUTION EPIDURAL; INFILTRATION; PERINEURAL
Status: COMPLETED
Start: 2021-11-21 | End: 2021-11-21

## 2021-11-21 RX ORDER — SODIUM CHLORIDE, SODIUM GLUCONATE, SODIUM ACETATE, POTASSIUM CHLORIDE AND MAGNESIUM CHLORIDE 526; 502; 368; 37; 30 MG/100ML; MG/100ML; MG/100ML; MG/100ML; MG/100ML
INJECTION, SOLUTION INTRAVENOUS
Status: DISCONTINUED | OUTPATIENT
Start: 2021-11-21 | End: 2021-11-21 | Stop reason: SURG

## 2021-11-21 RX ORDER — HYDROMORPHONE HYDROCHLORIDE 1 MG/ML
0.2 INJECTION, SOLUTION INTRAMUSCULAR; INTRAVENOUS; SUBCUTANEOUS
Status: CANCELLED | OUTPATIENT
Start: 2021-11-21

## 2021-11-21 RX ORDER — SODIUM CHLORIDE 9 MG/ML
INJECTION, SOLUTION INTRAVENOUS CONTINUOUS
Status: DISCONTINUED | OUTPATIENT
Start: 2021-11-22 | End: 2021-11-22

## 2021-11-21 RX ORDER — SODIUM CHLORIDE, SODIUM LACTATE, POTASSIUM CHLORIDE, AND CALCIUM CHLORIDE .6; .31; .03; .02 G/100ML; G/100ML; G/100ML; G/100ML
250 INJECTION, SOLUTION INTRAVENOUS PRN
Status: DISCONTINUED | OUTPATIENT
Start: 2021-11-21 | End: 2021-11-21

## 2021-11-21 RX ADMIN — MAGNESIUM SULFATE HEPTAHYDRATE 2 G/HR: 40 INJECTION, SOLUTION INTRAVENOUS at 14:14

## 2021-11-21 RX ADMIN — Medication 100 MCG: at 18:36

## 2021-11-21 RX ADMIN — METOCLOPRAMIDE 10 MG: 5 INJECTION, SOLUTION INTRAMUSCULAR; INTRAVENOUS at 18:00

## 2021-11-21 RX ADMIN — SODIUM CITRATE AND CITRIC ACID MONOHYDRATE 30 ML: 500; 334 SOLUTION ORAL at 18:00

## 2021-11-21 RX ADMIN — FENTANYL CITRATE 100 MCG: 50 INJECTION, SOLUTION INTRAMUSCULAR; INTRAVENOUS at 18:06

## 2021-11-21 RX ADMIN — LABETALOL HYDROCHLORIDE 200 MG: 100 TABLET, FILM COATED ORAL at 06:11

## 2021-11-21 RX ADMIN — MORPHINE SULFATE 1.5 MG: 0.5 INJECTION, SOLUTION EPIDURAL; INTRATHECAL; INTRAVENOUS at 18:39

## 2021-11-21 RX ADMIN — LIDOCAINE HYDROCHLORIDE AND EPINEPHRINE 8 ML: 20; 5 INJECTION, SOLUTION EPIDURAL; INFILTRATION; INTRACAUDAL; PERINEURAL at 18:11

## 2021-11-21 RX ADMIN — SODIUM CHLORIDE, SODIUM GLUCONATE, SODIUM ACETATE, POTASSIUM CHLORIDE AND MAGNESIUM CHLORIDE: 526; 502; 368; 37; 30 INJECTION, SOLUTION INTRAVENOUS at 18:09

## 2021-11-21 RX ADMIN — OXYTOCIN 125 ML/HR: 10 INJECTION, SOLUTION INTRAMUSCULAR; INTRAVENOUS at 19:18

## 2021-11-21 RX ADMIN — LABETALOL HYDROCHLORIDE 200 MG: 100 TABLET, FILM COATED ORAL at 14:14

## 2021-11-21 RX ADMIN — AZITHROMYCIN MONOHYDRATE 500 MG: 500 INJECTION, POWDER, LYOPHILIZED, FOR SOLUTION INTRAVENOUS at 18:27

## 2021-11-21 RX ADMIN — OXYTOCIN 1000 ML: 10 INJECTION, SOLUTION INTRAMUSCULAR; INTRAVENOUS at 19:16

## 2021-11-21 RX ADMIN — CEFAZOLIN 2 G: 330 INJECTION, POWDER, FOR SOLUTION INTRAMUSCULAR; INTRAVENOUS at 18:09

## 2021-11-21 RX ADMIN — LIDOCAINE HYDROCHLORIDE AND EPINEPHRINE 10 ML: 20; 5 INJECTION, SOLUTION EPIDURAL; INFILTRATION; INTRACAUDAL; PERINEURAL at 18:06

## 2021-11-21 RX ADMIN — FENTANYL CITRATE 50 MCG: 50 INJECTION, SOLUTION INTRAMUSCULAR; INTRAVENOUS at 09:11

## 2021-11-21 RX ADMIN — Medication 100 MCG: at 18:18

## 2021-11-21 RX ADMIN — OXYTOCIN 20 UNITS: 10 INJECTION, SOLUTION INTRAMUSCULAR; INTRAVENOUS at 18:32

## 2021-11-21 RX ADMIN — LABETALOL HYDROCHLORIDE 200 MG: 100 TABLET, FILM COATED ORAL at 22:26

## 2021-11-21 RX ADMIN — Medication 3 ML: at 18:06

## 2021-11-21 RX ADMIN — FENTANYL CITRATE 50 MCG: 50 INJECTION, SOLUTION INTRAMUSCULAR; INTRAVENOUS at 09:16

## 2021-11-21 RX ADMIN — ONDANSETRON 4 MG: 2 INJECTION INTRAMUSCULAR; INTRAVENOUS at 18:35

## 2021-11-21 RX ADMIN — SODIUM CHLORIDE, SODIUM GLUCONATE, SODIUM ACETATE, POTASSIUM CHLORIDE AND MAGNESIUM CHLORIDE 1500 ML: 526; 502; 368; 37; 30 INJECTION, SOLUTION INTRAVENOUS at 18:15

## 2021-11-21 RX ADMIN — DEXAMETHASONE SODIUM PHOSPHATE 8 MG: 4 INJECTION, SOLUTION INTRA-ARTICULAR; INTRALESIONAL; INTRAMUSCULAR; INTRAVENOUS; SOFT TISSUE at 18:35

## 2021-11-21 RX ADMIN — Medication 100 MCG: at 18:46

## 2021-11-21 RX ADMIN — BUPIVACAINE HYDROCHLORIDE 2 ML: 2.5 INJECTION, SOLUTION EPIDURAL; INFILTRATION; INTRACAUDAL; PERINEURAL at 09:16

## 2021-11-21 RX ADMIN — SCOPALAMINE 1 PATCH: 1 PATCH, EXTENDED RELEASE TRANSDERMAL at 18:35

## 2021-11-21 RX ADMIN — ROPIVACAINE HYDROCHLORIDE 200 MG: 2 INJECTION, SOLUTION EPIDURAL; INFILTRATION; PERINEURAL at 09:18

## 2021-11-21 RX ADMIN — ROPIVACAINE HYDROCHLORIDE 200 MG: 2 INJECTION, SOLUTION EPIDURAL; INFILTRATION at 09:18

## 2021-11-21 RX ADMIN — BUPIVACAINE HYDROCHLORIDE 2 ML: 2.5 INJECTION, SOLUTION EPIDURAL; INFILTRATION; INTRACAUDAL; PERINEURAL at 09:11

## 2021-11-21 RX ADMIN — FAMOTIDINE 20 MG: 10 INJECTION INTRAVENOUS at 18:00

## 2021-11-21 ASSESSMENT — PAIN SCALES - GENERAL: PAIN_LEVEL: 1

## 2021-11-21 NOTE — PROGRESS NOTES
Liza Pearson   37w1d    Subjective: Pt feeling some pain but declines pain control at this time.  Zhu bothering her more than contractions.  She denies vaginal bleeding or LOF.          Objective:   Vitals:    11/21/21 0539 11/21/21 0544 11/21/21 0549 11/21/21 0554   BP:       Pulse: 87 98 92 82   Resp:       Temp:       TempSrc:       SpO2: 94% 94% 95% 95%   Weight:       Height:         Fetal heart variability: moderate  Fetal Heart Rate decelerations: none  Fetal Heart Rate accelerations: yes  Baseline FHR: 140 per minute  Contractions: inverted based on position, every 1-5 minutes apart and irregular  Cervical Exam 5/70/-2  Fetal position: Cephalic  Membranes: ruptured: .yes  AROM: .yes, Meconium: .possible light meconium  IUPC: .yes, FSE .no    Meds:     Epidural : .no  Magnesium sulfate: .yes  Pitocin: .yes at 10 mu/min    Labs:    Lab:   Recent Results (from the past 48 hour(s))   Hold Blood Bank Specimen (Not Tested)    Collection Time: 11/20/21  2:20 PM   Result Value Ref Range    Holding Tube - Bb DONE    CBC WITH DIFFERENTIAL    Collection Time: 11/20/21  2:20 PM   Result Value Ref Range    WBC 6.8 4.8 - 10.8 K/uL    RBC 3.70 (L) 4.20 - 5.40 M/uL    Hemoglobin 11.4 (L) 12.0 - 16.0 g/dL    Hematocrit 35.1 (L) 37.0 - 47.0 %    MCV 94.9 81.4 - 97.8 fL    MCH 30.8 27.0 - 33.0 pg    MCHC 32.5 (L) 33.6 - 35.0 g/dL    RDW 65.5 (H) 35.9 - 50.0 fL    Platelet Count 222 164 - 446 K/uL    MPV 11.8 9.0 - 12.9 fL    Neutrophils-Polys 88.40 (H) 44.00 - 72.00 %    Lymphocytes 8.90 (L) 22.00 - 41.00 %    Monocytes 2.70 0.00 - 13.40 %    Eosinophils 0.00 0.00 - 6.90 %    Basophils 0.00 0.00 - 1.80 %    Nucleated RBC 0.00 /100 WBC    Neutrophils (Absolute) 6.01 2.00 - 7.15 K/uL    Lymphs (Absolute) 0.61 (L) 1.00 - 4.80 K/uL    Monos (Absolute) 0.18 0.00 - 0.85 K/uL    Eos (Absolute) 0.00 0.00 - 0.51 K/uL    Baso (Absolute) 0.00 0.00 - 0.12 K/uL    NRBC (Absolute) 0.00 K/uL    Anisocytosis 2+ (A)      Macrocytosis 1+     Microcytosis 1+    DIFFERENTIAL MANUAL    Collection Time: 11/20/21  2:20 PM   Result Value Ref Range    Manual Diff Status PERFORMED    PERIPHERAL SMEAR REVIEW    Collection Time: 11/20/21  2:20 PM   Result Value Ref Range    Peripheral Smear Review see below    PLATELET ESTIMATE    Collection Time: 11/20/21  2:20 PM   Result Value Ref Range    Plt Estimation Normal    MORPHOLOGY    Collection Time: 11/20/21  2:20 PM   Result Value Ref Range    RBC Morphology Present    ASPARTATE AMINO-GUPTA    Collection Time: 11/20/21  4:45 PM   Result Value Ref Range    AST(SGOT) 12 12 - 45 U/L   ALANINE AMINO-TRANS    Collection Time: 11/20/21  4:45 PM   Result Value Ref Range    ALT(SGPT) 17 2 - 50 U/L   CREATININE    Collection Time: 11/20/21  4:45 PM   Result Value Ref Range    Creatinine 0.48 (L) 0.50 - 1.40 mg/dL   CBC WITHOUT DIFFERENTIAL    Collection Time: 11/20/21  4:45 PM   Result Value Ref Range    WBC 7.5 4.8 - 10.8 K/uL    RBC 3.63 (L) 4.20 - 5.40 M/uL    Hemoglobin 11.1 (L) 12.0 - 16.0 g/dL    Hematocrit 34.3 (L) 37.0 - 47.0 %    MCV 94.5 81.4 - 97.8 fL    MCH 30.6 27.0 - 33.0 pg    MCHC 32.4 (L) 33.6 - 35.0 g/dL    RDW 65.3 (H) 35.9 - 50.0 fL    Platelet Count 225 164 - 446 K/uL    MPV 11.4 9.0 - 12.9 fL   ESTIMATED GFR    Collection Time: 11/20/21  4:45 PM   Result Value Ref Range    GFR If African American >60 >60 mL/min/1.73 m 2    GFR If Non African American >60 >60 mL/min/1.73 m 2   Comp Metabolic Panel    Collection Time: 11/20/21  4:45 PM   Result Value Ref Range    Sodium 136 135 - 145 mmol/L    Potassium 4.0 3.6 - 5.5 mmol/L    Chloride 107 96 - 112 mmol/L    Co2 17 (L) 20 - 33 mmol/L    Anion Gap 12.0 7.0 - 16.0    Glucose 74 65 - 99 mg/dL    Bun 8 8 - 22 mg/dL    Creatinine 0.48 (L) 0.50 - 1.40 mg/dL    Calcium 8.7 8.5 - 10.5 mg/dL    AST(SGOT) 12 12 - 45 U/L    ALT(SGPT) 17 2 - 50 U/L    Alkaline Phosphatase 161 (H) 30 - 99 U/L    Total Bilirubin 0.2 0.1 - 1.5 mg/dL    Albumin 3.2  3.2 - 4.9 g/dL    Total Protein 6.7 6.0 - 8.2 g/dL    Globulin 3.5 1.9 - 3.5 g/dL    A-G Ratio 0.9 g/dL   URINE CREATININE RANDOM    Collection Time: 11/20/21  5:40 PM   Result Value Ref Range    Creatinine, Random Urine 124.05 mg/dL   URINE PROTEIN    Collection Time: 11/20/21  5:40 PM   Result Value Ref Range    Total Protein, Urine 24.0 (H) 0.0 - 15.0 mg/dL   POCT glucose device results    Collection Time: 11/20/21  7:33 PM   Result Value Ref Range    Glucose - Accu-Ck 76 65 - 99 mg/dL   POCT glucose device results    Collection Time: 11/20/21  9:33 PM   Result Value Ref Range    Glucose - Accu-Ck 97 65 - 99 mg/dL   POCT glucose device results    Collection Time: 11/21/21 12:21 AM   Result Value Ref Range    Glucose - Accu-Ck 109 (H) 65 - 99 mg/dL   SARS-COV Antigen JULIO CÉSAR: Collect dry nasal swab    Collection Time: 11/21/21  2:20 AM   Result Value Ref Range    SARS-CoV-2 Source Nasal Swab     SARS-COV ANTIGEN JULIO CÉSAR NotDetected NotDetected   POCT glucose device results    Collection Time: 11/21/21  3:00 AM   Result Value Ref Range    Glucose - Accu-Ck 97 65 - 99 mg/dL   POCT glucose device results    Collection Time: 11/21/21  5:48 AM   Result Value Ref Range    Glucose - Accu-Ck 87 65 - 99 mg/dL       Ass:   37w1d IOL for gHTN with SI pre-eclampsia on magnesium sulfate  BP's stable, mild range  S/p cooks balloon and now on pitocin  Labor State: Early latent labor. and Satisfactory labor progress.  GBS negative  Category 1 FHT    P.   1. AROM performed easily and IUPC placed  2. Continue pitocin to maintain adequate MVU's  3. Continue magnesium sulfate infusion.   4. Reassess progress 2-4 hours or PRN.

## 2021-11-21 NOTE — CARE PLAN
The patient is Watcher - Medium risk of patient condition declining or worsening         Progress made toward(s) clinical / shift goals: Control BP's and healthy delivery.     Patient is not progressing towards the following goals: N/A.       Problem: Knowledge Deficit - L&D  Goal: Patient and family/caregivers will demonstrate understanding of plan of care, disease process/condition, diagnostic tests and medications  Outcome: Progressing   POC discussed with pt and FOB. Both pt and FOB verbalized understanding.   Problem: Psychosocial - L&D  Goal: Patient's level of anxiety will decrease  Outcome: Progressing   Encouraged pt to express feelings and concerns.   Problem: Risk for Venous Thromboembolism (VTE)  Goal: VTE prevention measures will be implemented and patient will remain free from VTE  Outcome: Progressing   SCD's in use.   Problem: Pain  Goal: Patient's pain will be alleviated or reduced to the patient’s comfort goal  Outcome: Progressing   Pt would like an epidural when in active labor.

## 2021-11-21 NOTE — PROGRESS NOTES
"1300 escorted pt from front office to room 224. Put on US and [place], took VS, started admission process.  needed    1550 report given to Dr. Aguilera including presence of pt contractions. Notified Provider of Pt's high Bps and asked for eval.     1615 Dr. Aguilera at bedside for cervical exam. Pt 1.5/50/-3. 50/50 cooks balloon placed. Orders given to watch next BP and report if in severe pressures again. Orders given to get urine creatine and CMP.    1745 report back to Dr. Aguilera, pressures still elevated in 170's. Orders given to administer procardia and recheck pressures in 30 mins. See MAR    1805 pressures rechecked, BP still in 160's. Report given to Dr. Aguilera. Orders given to start 6 mg Magnesium Sulfate bolus with a 2 mg maintaince dose. Dr. Aguilera stated she \"doesn't take Magnesium sulfate blood draws to monitor mag levels\" wanted to warn me. Because it's \" not something she does and DTR are sufficient to monitor.\"    1900 pt handoff completed and report given to PM RN. Pt is stable and finishing up last bolus bag of Magnesium Sulfate.     "

## 2021-11-21 NOTE — PROGRESS NOTES
Pt appears comfortable with epidural  Category 1 FHT per Dr. Gonzalez  Baseline , Moderate Variability, improved with amnio infusion   Antlip @ 13:00    A/P:  26 y.o.   w @ 37w0d with an LOLA 2021 by LMP = 20w U/S. Pregnancy complicated by use of reproductive techology in first trimester, gestational hypertension, anemia, fetal macrosomia, and two-vessel cord on U/S. Patient presented to L&D for scheduled induction of labor at 37 weeks 0 days 2/2 gestational hypertension.    -Admit to L&D  -Monitor for labor  -Mag sulfate, Labetolol and Nifedipine for persistently elevated blood pressures  -Monitor DTR  -Point-of-care glucose checks every 2 hours while in latent labor every 1 hour point-of-care glucose checks when in active labor  -Clear liquid diet  -Ad matt. activity

## 2021-11-21 NOTE — H&P
"OB H&P:    CC: Scheduled induction of Labor    HPI:  Ms. Liza Pearson is a 26 y.o.   w @ 37w0d with an LOLA 2021 by LMP = 20w U/S. Pregnancy complicated by use of reproductive techology in first trimester, gestational hypertension, anemia, fetal macrosomia, and two-vessel cord on U/S.  Patient presented to L&D for scheduled induction of labor at 37 weeks 0 days 2/2 gestational hypertension.    Contractions: No   Loss of fluid: Yes, she noted some loss of clearish, whitish mucus fluid from her vagina this morning.  Vaginal bleeding: No   Fetal movement: present    [facility] with [facility]    PNL:  Recent Labs     21  1727   ABOGROUP A   RUBELLAIGG 208.00   HEPBSAG Non-Reactive   HEPCAB Non-Reactive   TSHULTRASEN 1.050     Rh+, RI, HIV neg, TrepAb neg, HBsAg NR, GC/CT neg/neg  Glucola: Abnormal,  GBS -    ROS: Negative unless stated in HPI    OB History    Para Term  AB Living   2       1     SAB IAB Ectopic Molar Multiple Live Births   1                # Outcome Date GA Lbr Kin/2nd Weight Sex Delivery Anes PTL Lv   2 Current            1 SAB 20 7w0d             Obstetric Comments   Pregnancy planned. FOB involved and supportive. Pt does work at [employer]       GYN: denies STIs, no history of abnormal paps    Past Medical History:   Diagnosis Date   • Anemia in pregnancy 10/20/2021   • GDM, class A2 2021       History reviewed. No pertinent surgical history.    No current facility-administered medications on file prior to encounter.     Current Outpatient Medications on File Prior to Encounter   Medication Sig Dispense Refill   • ferrous sulfate 325 (65 Fe) MG tablet Take 325 mg by mouth every day.     • Alcohol Swabs Wipe site with prep pad prior to injection. 100 Each 2   • Insulin Syringe-Needle U-100 (BD INSULIN SYRINGE U/F) 31G X 5/16\" 0.3 ML Misc use with 5 units of NPH at bedtime (pt needs insulin education) 100 Each 1   • insulin NPH " (HUMULIN/NOVOLIN) 100 UNIT/ML Suspension Inject 5 Units under the skin at bedtime. 10 mL 10   • Prenatal Vit-Fe Fumarate-FA (PRENATAL VITAMIN AND MINERAL) 28-0.8 MG Tab Take  by mouth.         History reviewed. No pertinent family history.    Social History     Tobacco Use   • Smoking status: Never Smoker   • Smokeless tobacco: Never Used   Vaping Use   • Vaping Use: Never used   Substance Use Topics   • Alcohol use: Never   • Drug use: Never         PE:  Vitals:    21 1335 21 1535 21 1538 21 1556   BP: 152/89 (!) 167/92 (!) 162/89 154/98   Pulse: 70 67 67 71   Resp:       Temp:       TempSrc:       SpO2:       Weight:       Height:         gen: AAO, NAD  abd: soft, gravid, NT, EFW 2297 g, 79.3% (growth US 10/20/2021)  Ext: NT, No  Edema     SVE: 1.5/50/-3 per RN exam  FHT: 130/mod variability/+ accels/ - decels  Kameron: Irregular, q 4-5 minutes in frequency, 2 minutes in duration     A/P: 26 y.o.   w @ 37w0d with an LOLA 2021 by LMP = 20w U/S. Pregnancy complicated by use of reproductive techology in first trimester, gestational hypertension, anemia, fetal macrosomia, and two-vessel cord on U/S. Patient presented to L&D for scheduled induction of labor at 37 weeks 0 days 2/2 gestational hypertension.    -Admit to L&D  -Monitor for labor  -AST/ALT, serum creatinine, CBC, urine protein, urine creatinine assess for severe features  -Nifedipine 10 mg for persistently elevated blood pressures  -Point-of-care glucose checks every 2 hours while in latent labor every 1 hour point-of-care glucose checks when in active labor  -Clear liquid diet  -Ad matt. activity    Yamini Amato M.D.

## 2021-11-21 NOTE — PROGRESS NOTES
0100-rcvd report from Alisha ROBERTSON and assumed care of pt. Pt sleeping at this time.  0420-Lindsey Calvin CNM at bedside to see pt. Cooks balloon removed. SVE=4/70/-2  Will cont. To increase pit as appropriate.  0700-report given to dayshift RN

## 2021-11-21 NOTE — ANESTHESIA PROCEDURE NOTES
Epidural Block    Date/Time: 11/21/2021 9:05 AM  Performed by: Cruz Garcia M.D.  Authorized by: Cruz Garcia M.D.     Patient Location:  [facility]  Start Time:  11/21/2021 9:05 AM  End Time:  11/21/2021 9:11 AM  Reason for Block: labor analgesia    patient identified, IV checked, site marked, risks and benefits discussed, surgical consent, monitors and equipment checked, pre-op evaluation and timeout performed    Patient Position:  Sitting  Prep: ChloraPrep, patient draped and sterile technique    Monitoring:  Blood pressure, continuous pulse oximetry and heart rate  Approach:  Midline  Location:  L3-L4  Injection Technique:  TROY saline  Skin infiltration:  Lidocaine  Strength:  1%  Dose:  3ml  Needle Type:  Tuohy  Needle Gauge:  17 G  Needle Length:  3.5 in  Loss of resistance::  4  Catheter Size:  19 G  Catheter at Skin Depth:  10  Test Dose Result:  Negative   Success on 1st pass  No heme/CSF  Catheter threaded easily  Negative aspiration  No evidence of complications  Patient comfortable after loading dose

## 2021-11-21 NOTE — PROGRESS NOTES
Bps remained elevated with good pain control.  10mg po Procardia given and 30 mins later still elevated.  Plan to start magnesium.  If continues to have elevated pressures will give IV hydralazine.     Jackelyn Aguilera D.O.

## 2021-11-21 NOTE — PROGRESS NOTES
0700 Report received from Elba ROBERTSON,  0705 GUILLREMO RIVERA at bedside. AROM MEC. IUPC placed.   0905 Dr. Garcia at bedside. Epidural place. Pt tolerated  1400 Dr. Gonzalez at bedside SVE 9.5/100/0  1500 SVE complete pushing began  1720 Dr. Gonzalez at bedside to assess patient.  1744  Section decided to to failure to descend.  1900 Report given to Alesia ROBERTSON

## 2021-11-21 NOTE — PROGRESS NOTES
Liza Pearson   37w1d    Subjective: Pt now at 12 hours post cooks balloon placement and feeling some pain with contractions but not needing pain relief at this time.  Denies LOF or vaginal bleeding.          Objective:   Vitals:    11/21/21 0259 11/21/21 0304 11/21/21 0306 11/21/21 0309   BP:   136/73    Pulse: 93 91 87 88   Resp:       Temp:       TempSrc:       SpO2: 95% 95%  96%   Weight:       Height:         Fetal heart variability: moderate  Fetal Heart Rate decelerations: none  Fetal Heart Rate accelerations: yes  Baseline FHR: 135 per minute  Contractions: every 2-4 minutes apart, mild on palpation  Cervical Exam 4/70/-2, Cooks removed easily with gentle traction  Fetal position: Cephalic  Membranes: ruptured: .no  AROM: .no, Meconium: .no  IUPC: .no, FSE .no    Meds:     Epidural : .N\A  Magnesium sulfate: .yes  Pitocin: .yes at 8 mu/min    Labs:    Lab:   Recent Results (from the past 48 hour(s))   Hold Blood Bank Specimen (Not Tested)    Collection Time: 11/20/21  2:20 PM   Result Value Ref Range    Holding Tube - Bb DONE    CBC WITH DIFFERENTIAL    Collection Time: 11/20/21  2:20 PM   Result Value Ref Range    WBC 6.8 4.8 - 10.8 K/uL    RBC 3.70 (L) 4.20 - 5.40 M/uL    Hemoglobin 11.4 (L) 12.0 - 16.0 g/dL    Hematocrit 35.1 (L) 37.0 - 47.0 %    MCV 94.9 81.4 - 97.8 fL    MCH 30.8 27.0 - 33.0 pg    MCHC 32.5 (L) 33.6 - 35.0 g/dL    RDW 65.5 (H) 35.9 - 50.0 fL    Platelet Count 222 164 - 446 K/uL    MPV 11.8 9.0 - 12.9 fL    Neutrophils-Polys 88.40 (H) 44.00 - 72.00 %    Lymphocytes 8.90 (L) 22.00 - 41.00 %    Monocytes 2.70 0.00 - 13.40 %    Eosinophils 0.00 0.00 - 6.90 %    Basophils 0.00 0.00 - 1.80 %    Nucleated RBC 0.00 /100 WBC    Neutrophils (Absolute) 6.01 2.00 - 7.15 K/uL    Lymphs (Absolute) 0.61 (L) 1.00 - 4.80 K/uL    Monos (Absolute) 0.18 0.00 - 0.85 K/uL    Eos (Absolute) 0.00 0.00 - 0.51 K/uL    Baso (Absolute) 0.00 0.00 - 0.12 K/uL    NRBC (Absolute) 0.00 K/uL    Anisocytosis  2+ (A)     Macrocytosis 1+     Microcytosis 1+    DIFFERENTIAL MANUAL    Collection Time: 11/20/21  2:20 PM   Result Value Ref Range    Manual Diff Status PERFORMED    PERIPHERAL SMEAR REVIEW    Collection Time: 11/20/21  2:20 PM   Result Value Ref Range    Peripheral Smear Review see below    PLATELET ESTIMATE    Collection Time: 11/20/21  2:20 PM   Result Value Ref Range    Plt Estimation Normal    MORPHOLOGY    Collection Time: 11/20/21  2:20 PM   Result Value Ref Range    RBC Morphology Present    ASPARTATE AMINO-GUPTA    Collection Time: 11/20/21  4:45 PM   Result Value Ref Range    AST(SGOT) 12 12 - 45 U/L   ALANINE AMINO-TRANS    Collection Time: 11/20/21  4:45 PM   Result Value Ref Range    ALT(SGPT) 17 2 - 50 U/L   CREATININE    Collection Time: 11/20/21  4:45 PM   Result Value Ref Range    Creatinine 0.48 (L) 0.50 - 1.40 mg/dL   CBC WITHOUT DIFFERENTIAL    Collection Time: 11/20/21  4:45 PM   Result Value Ref Range    WBC 7.5 4.8 - 10.8 K/uL    RBC 3.63 (L) 4.20 - 5.40 M/uL    Hemoglobin 11.1 (L) 12.0 - 16.0 g/dL    Hematocrit 34.3 (L) 37.0 - 47.0 %    MCV 94.5 81.4 - 97.8 fL    MCH 30.6 27.0 - 33.0 pg    MCHC 32.4 (L) 33.6 - 35.0 g/dL    RDW 65.3 (H) 35.9 - 50.0 fL    Platelet Count 225 164 - 446 K/uL    MPV 11.4 9.0 - 12.9 fL   ESTIMATED GFR    Collection Time: 11/20/21  4:45 PM   Result Value Ref Range    GFR If African American >60 >60 mL/min/1.73 m 2    GFR If Non African American >60 >60 mL/min/1.73 m 2   Comp Metabolic Panel    Collection Time: 11/20/21  4:45 PM   Result Value Ref Range    Sodium 136 135 - 145 mmol/L    Potassium 4.0 3.6 - 5.5 mmol/L    Chloride 107 96 - 112 mmol/L    Co2 17 (L) 20 - 33 mmol/L    Anion Gap 12.0 7.0 - 16.0    Glucose 74 65 - 99 mg/dL    Bun 8 8 - 22 mg/dL    Creatinine 0.48 (L) 0.50 - 1.40 mg/dL    Calcium 8.7 8.5 - 10.5 mg/dL    AST(SGOT) 12 12 - 45 U/L    ALT(SGPT) 17 2 - 50 U/L    Alkaline Phosphatase 161 (H) 30 - 99 U/L    Total Bilirubin 0.2 0.1 - 1.5 mg/dL     Albumin 3.2 3.2 - 4.9 g/dL    Total Protein 6.7 6.0 - 8.2 g/dL    Globulin 3.5 1.9 - 3.5 g/dL    A-G Ratio 0.9 g/dL   URINE CREATININE RANDOM    Collection Time: 11/20/21  5:40 PM   Result Value Ref Range    Creatinine, Random Urine 124.05 mg/dL   URINE PROTEIN    Collection Time: 11/20/21  5:40 PM   Result Value Ref Range    Total Protein, Urine 24.0 (H) 0.0 - 15.0 mg/dL   POCT glucose device results    Collection Time: 11/20/21  7:33 PM   Result Value Ref Range    Glucose - Accu-Ck 76 65 - 99 mg/dL   POCT glucose device results    Collection Time: 11/20/21  9:33 PM   Result Value Ref Range    Glucose - Accu-Ck 97 65 - 99 mg/dL   POCT glucose device results    Collection Time: 11/21/21 12:21 AM   Result Value Ref Range    Glucose - Accu-Ck 109 (H) 65 - 99 mg/dL   POCT glucose device results    Collection Time: 11/21/21  3:00 AM   Result Value Ref Range    Glucose - Accu-Ck 97 65 - 99 mg/dL       Ass:   37w1d IOL for gHTN with SI severe pre-eclampsia based on BP's  S/p cooks balloon x 12 hours now on pitocin  Magnesium sulfate infusion and BP's now 130-140/60-70s  Labor State: Not in labor.  GBS negative  Category 1 FHT    P.   1. Continue pitocin per protocol  2. Reassess cervical change 2 hours and consider AROM if appropriate  3. Continue magnesium sulfate infusion

## 2021-11-21 NOTE — PROGRESS NOTES
1900- Received report from AILYN Lala. POC discussed. Monitoring DTR's rather than drawing magnesium levels, per Dr. Aguilera.     1930- BS 76. Dr. Aguilera notified. No new orders.     0100- Report given to AILYN Arreola. POC discussed.

## 2021-11-21 NOTE — ANESTHESIA PREPROCEDURE EVALUATION
Date: 21  Procedure: Labor Epidural        álvarez pregnancy at 37 weeks gestation. GDM, g-HTN, pre-eclampsia.    To OR at 1809 for failure to descend after 3 hours of pushing.    Physical Exam    Airway   Mallampati: II  TM distance: >3 FB  Neck ROM: full       Cardiovascular - normal exam  Rhythm: regular  Rate: normal  (-) murmur     Dental - normal exam           Pulmonary - normal exam  Breath sounds clear to auscultation     Abdominal    Neurological - normal exam                 Anesthesia Plan    ASA 2       Plan - epidural   Neuraxial block will be labor analgesia                  Pertinent diagnostic labs and testing reviewed    Informed Consent:    Anesthetic plan and risks discussed with patient.

## 2021-11-22 LAB
ERYTHROCYTE [DISTWIDTH] IN BLOOD BY AUTOMATED COUNT: 67.7 FL (ref 35.9–50)
GLUCOSE BLD-MCNC: 115 MG/DL (ref 65–99)
GLUCOSE BLD-MCNC: 168 MG/DL (ref 65–99)
HCT VFR BLD AUTO: 29.4 % (ref 37–47)
HGB BLD-MCNC: 9.4 G/DL (ref 12–16)
MAGNESIUM SERPL-MCNC: 5.9 MG/DL (ref 1.5–2.5)
MAGNESIUM SERPL-MCNC: 6.5 MG/DL (ref 1.5–2.5)
MAGNESIUM SERPL-MCNC: 6.7 MG/DL (ref 1.5–2.5)
MCH RBC QN AUTO: 30.6 PG (ref 27–33)
MCHC RBC AUTO-ENTMCNC: 32 G/DL (ref 33.6–35)
MCV RBC AUTO: 95.8 FL (ref 81.4–97.8)
PATHOLOGY CONSULT NOTE: NORMAL
PLATELET # BLD AUTO: 207 K/UL (ref 164–446)
PMV BLD AUTO: 11 FL (ref 9–12.9)
RBC # BLD AUTO: 3.07 M/UL (ref 4.2–5.4)
WBC # BLD AUTO: 15 K/UL (ref 4.8–10.8)

## 2021-11-22 PROCEDURE — 700102 HCHG RX REV CODE 250 W/ 637 OVERRIDE(OP): Performed by: ANESTHESIOLOGY

## 2021-11-22 PROCEDURE — A9270 NON-COVERED ITEM OR SERVICE: HCPCS | Performed by: OBSTETRICS & GYNECOLOGY

## 2021-11-22 PROCEDURE — 770002 HCHG ROOM/CARE - OB PRIVATE (112)

## 2021-11-22 PROCEDURE — 700111 HCHG RX REV CODE 636 W/ 250 OVERRIDE (IP): Performed by: OBSTETRICS & GYNECOLOGY

## 2021-11-22 PROCEDURE — 700111 HCHG RX REV CODE 636 W/ 250 OVERRIDE (IP): Performed by: ANESTHESIOLOGY

## 2021-11-22 PROCEDURE — 85027 COMPLETE CBC AUTOMATED: CPT

## 2021-11-22 PROCEDURE — 82962 GLUCOSE BLOOD TEST: CPT

## 2021-11-22 PROCEDURE — 36415 COLL VENOUS BLD VENIPUNCTURE: CPT

## 2021-11-22 PROCEDURE — 700105 HCHG RX REV CODE 258: Performed by: OBSTETRICS & GYNECOLOGY

## 2021-11-22 PROCEDURE — 700102 HCHG RX REV CODE 250 W/ 637 OVERRIDE(OP): Performed by: OBSTETRICS & GYNECOLOGY

## 2021-11-22 PROCEDURE — 83735 ASSAY OF MAGNESIUM: CPT | Mod: 91

## 2021-11-22 PROCEDURE — A9270 NON-COVERED ITEM OR SERVICE: HCPCS | Performed by: ANESTHESIOLOGY

## 2021-11-22 RX ORDER — DIPHENHYDRAMINE HYDROCHLORIDE 50 MG/ML
25 INJECTION INTRAMUSCULAR; INTRAVENOUS EVERY 6 HOURS PRN
Status: DISCONTINUED | OUTPATIENT
Start: 2021-11-22 | End: 2021-11-22

## 2021-11-22 RX ORDER — DOCUSATE SODIUM 100 MG/1
100 CAPSULE, LIQUID FILLED ORAL 2 TIMES DAILY PRN
Status: DISCONTINUED | OUTPATIENT
Start: 2021-11-22 | End: 2021-11-25 | Stop reason: HOSPADM

## 2021-11-22 RX ORDER — OXYCODONE HYDROCHLORIDE 5 MG/1
5 TABLET ORAL EVERY 4 HOURS PRN
Status: DISCONTINUED | OUTPATIENT
Start: 2021-11-22 | End: 2021-11-22

## 2021-11-22 RX ORDER — ONDANSETRON 4 MG/1
4 TABLET, ORALLY DISINTEGRATING ORAL EVERY 6 HOURS PRN
Status: DISCONTINUED | OUTPATIENT
Start: 2021-11-22 | End: 2021-11-25 | Stop reason: HOSPADM

## 2021-11-22 RX ORDER — DIPHENHYDRAMINE HYDROCHLORIDE 50 MG/ML
12.5 INJECTION INTRAMUSCULAR; INTRAVENOUS EVERY 6 HOURS PRN
Status: DISCONTINUED | OUTPATIENT
Start: 2021-11-22 | End: 2021-11-22

## 2021-11-22 RX ORDER — ONDANSETRON 2 MG/ML
4 INJECTION INTRAMUSCULAR; INTRAVENOUS EVERY 6 HOURS PRN
Status: DISCONTINUED | OUTPATIENT
Start: 2021-11-22 | End: 2021-11-25 | Stop reason: HOSPADM

## 2021-11-22 RX ORDER — IBUPROFEN 600 MG/1
600 TABLET ORAL EVERY 6 HOURS
Status: DISCONTINUED | OUTPATIENT
Start: 2021-11-22 | End: 2021-11-22

## 2021-11-22 RX ORDER — DIPHENHYDRAMINE HCL 25 MG
25 TABLET ORAL EVERY 6 HOURS PRN
Status: DISCONTINUED | OUTPATIENT
Start: 2021-11-22 | End: 2021-11-25 | Stop reason: HOSPADM

## 2021-11-22 RX ORDER — ACETAMINOPHEN 500 MG
1000 TABLET ORAL EVERY 6 HOURS
Status: DISCONTINUED | OUTPATIENT
Start: 2021-11-22 | End: 2021-11-22

## 2021-11-22 RX ORDER — MAGNESIUM SULFATE HEPTAHYDRATE 40 MG/ML
2 INJECTION, SOLUTION INTRAVENOUS CONTINUOUS
Status: DISCONTINUED | OUTPATIENT
Start: 2021-11-22 | End: 2021-11-22

## 2021-11-22 RX ORDER — OXYCODONE HYDROCHLORIDE 10 MG/1
10 TABLET ORAL EVERY 4 HOURS PRN
Status: DISCONTINUED | OUTPATIENT
Start: 2021-11-22 | End: 2021-11-22

## 2021-11-22 RX ORDER — SIMETHICONE 125 MG
125 TABLET,CHEWABLE ORAL 4 TIMES DAILY PRN
Status: DISCONTINUED | OUTPATIENT
Start: 2021-11-22 | End: 2021-11-25 | Stop reason: HOSPADM

## 2021-11-22 RX ORDER — HYDROMORPHONE HYDROCHLORIDE 1 MG/ML
0.4 INJECTION, SOLUTION INTRAMUSCULAR; INTRAVENOUS; SUBCUTANEOUS
Status: DISCONTINUED | OUTPATIENT
Start: 2021-11-22 | End: 2021-11-22

## 2021-11-22 RX ORDER — IBUPROFEN 600 MG/1
600 TABLET ORAL EVERY 6 HOURS
Status: DISCONTINUED | OUTPATIENT
Start: 2021-11-22 | End: 2021-11-24

## 2021-11-22 RX ORDER — MISOPROSTOL 200 UG/1
600 TABLET ORAL
Status: DISCONTINUED | OUTPATIENT
Start: 2021-11-22 | End: 2021-11-25 | Stop reason: HOSPADM

## 2021-11-22 RX ORDER — DIPHENHYDRAMINE HYDROCHLORIDE 50 MG/ML
25 INJECTION INTRAMUSCULAR; INTRAVENOUS EVERY 6 HOURS PRN
Status: DISCONTINUED | OUTPATIENT
Start: 2021-11-22 | End: 2021-11-25 | Stop reason: HOSPADM

## 2021-11-22 RX ORDER — ONDANSETRON 2 MG/ML
4 INJECTION INTRAMUSCULAR; INTRAVENOUS EVERY 6 HOURS PRN
Status: DISCONTINUED | OUTPATIENT
Start: 2021-11-22 | End: 2021-11-22

## 2021-11-22 RX ORDER — SODIUM CHLORIDE, SODIUM LACTATE, POTASSIUM CHLORIDE, CALCIUM CHLORIDE 600; 310; 30; 20 MG/100ML; MG/100ML; MG/100ML; MG/100ML
INJECTION, SOLUTION INTRAVENOUS PRN
Status: DISCONTINUED | OUTPATIENT
Start: 2021-11-22 | End: 2021-11-25 | Stop reason: HOSPADM

## 2021-11-22 RX ORDER — MORPHINE SULFATE 10 MG/ML
4 INJECTION, SOLUTION INTRAMUSCULAR; INTRAVENOUS
Status: DISCONTINUED | OUTPATIENT
Start: 2021-11-22 | End: 2021-11-25 | Stop reason: HOSPADM

## 2021-11-22 RX ORDER — BISACODYL 10 MG
10 SUPPOSITORY, RECTAL RECTAL PRN
Status: DISCONTINUED | OUTPATIENT
Start: 2021-11-22 | End: 2021-11-25 | Stop reason: HOSPADM

## 2021-11-22 RX ORDER — KETOROLAC TROMETHAMINE 30 MG/ML
30 INJECTION, SOLUTION INTRAMUSCULAR; INTRAVENOUS EVERY 6 HOURS
Status: DISCONTINUED | OUTPATIENT
Start: 2021-11-22 | End: 2021-11-22

## 2021-11-22 RX ORDER — ONDANSETRON 4 MG/1
4 TABLET, ORALLY DISINTEGRATING ORAL EVERY 6 HOURS PRN
Status: DISCONTINUED | OUTPATIENT
Start: 2021-11-22 | End: 2021-11-22

## 2021-11-22 RX ORDER — HYDROMORPHONE HYDROCHLORIDE 1 MG/ML
0.2 INJECTION, SOLUTION INTRAMUSCULAR; INTRAVENOUS; SUBCUTANEOUS
Status: DISCONTINUED | OUTPATIENT
Start: 2021-11-22 | End: 2021-11-22

## 2021-11-22 RX ADMIN — IBUPROFEN 600 MG: 600 TABLET ORAL at 22:24

## 2021-11-22 RX ADMIN — ACETAMINOPHEN 1000 MG: 500 TABLET ORAL at 08:03

## 2021-11-22 RX ADMIN — LABETALOL HYDROCHLORIDE 200 MG: 100 TABLET, FILM COATED ORAL at 22:24

## 2021-11-22 RX ADMIN — DOCUSATE SODIUM 100 MG: 100 CAPSULE ORAL at 22:23

## 2021-11-22 RX ADMIN — KETOROLAC TROMETHAMINE 30 MG: 30 INJECTION, SOLUTION INTRAMUSCULAR at 01:25

## 2021-11-22 RX ADMIN — KETOROLAC TROMETHAMINE 30 MG: 30 INJECTION, SOLUTION INTRAMUSCULAR at 15:39

## 2021-11-22 RX ADMIN — MAGNESIUM SULFATE HEPTAHYDRATE 2 G/HR: 40 INJECTION, SOLUTION INTRAVENOUS at 13:54

## 2021-11-22 RX ADMIN — KETOROLAC TROMETHAMINE 30 MG: 30 INJECTION, SOLUTION INTRAMUSCULAR at 08:03

## 2021-11-22 RX ADMIN — SODIUM CHLORIDE: 9 INJECTION, SOLUTION INTRAVENOUS at 06:42

## 2021-11-22 RX ADMIN — LABETALOL HYDROCHLORIDE 200 MG: 100 TABLET, FILM COATED ORAL at 06:42

## 2021-11-22 RX ADMIN — ACETAMINOPHEN 1000 MG: 500 TABLET ORAL at 15:38

## 2021-11-22 RX ADMIN — ACETAMINOPHEN 1000 MG: 500 TABLET ORAL at 01:24

## 2021-11-22 ASSESSMENT — PAIN DESCRIPTION - PAIN TYPE
TYPE: ACUTE PAIN
TYPE: ACUTE PAIN
TYPE: SURGICAL PAIN
TYPE: ACUTE PAIN

## 2021-11-22 NOTE — ANESTHESIA TIME REPORT
Anesthesia Start and Stop Event Times     Date Time Event    2021 0856 Ready for Procedure     905 Anesthesia Start      Anesthesia Stop        Responsible Staff  21    Name Role Begin End    Cruz Garcia M.D. Anesth 905        Preop Diagnosis (Free Text):  Pre-op Diagnosis     Failure to descend      [facility] pregnancy at 37 weeks gestation, labor pain  Pre-eclampsia   required for failure to descend    Preop Diagnosis (Codes):    Premium Reason  E. Weekend    Comments: Epidural Time: 1118-5346;  time: 0935-2545

## 2021-11-22 NOTE — PROGRESS NOTES
1900- Assumed care of pt in the OR. RN will continue care through PACU. CMP and Mag level to be drawn in PACU STAT. Call MD with results to evaluate restarting Mag Sulfate for elevated Bps.    2027- Critical lab values reviewed with Dr. Corarles. Orders received to restart Mag sulfate at 2g hr. Fluids will change to NS at 75 ml/hr after pitocin is finished.     0030- Pt transferred to MB  via stretcher in stable condition. Accompanied by significant other. SBAR given to AILYN Ruiz.

## 2021-11-22 NOTE — PROGRESS NOTES
Post Partum Progress Note    Name:   Liza Pearson   Date/Time:  2021 - 7:00 AM  Chief Admitting Dx:  Encounter for planned induction of labor [Z34.90]  Delivery Type:   for maternal exhaustion  Post-Op/Post Partum Days #:  1    Subjective:  Abdominal pain: no  Ambulating:   no  Tolerating liquids:  yes  Tolerating food:  yes common adult  Flatus:   no  BM:    no  Bleeding:   without any bleeding  Voiding:   No- forrester still in  Dizziness:   no  Feeding:   both breast and bottle    Vitals:    21 0200 21 0300 21 0400 21 0558   BP: 145/84  126/72 118/71   Pulse: 68 70 70 70   Resp: 18 18 16 18   Temp: 36.6 °C (97.9 °F)  36.4 °C (97.5 °F) 36.2 °C (97.2 °F)   TempSrc: Temporal  Temporal Temporal   SpO2: 93% 96% 94% 97%   Weight:       Height:           Exam:  A&Ox3 NAD  Breast: no engorgement  Abdomen: Abdomen soft, non-tender. BS normal. No masses,  No organomegaly  Fundal Tenderness:  no  Fundus Firm: yes  Incision: dry and intact  Below umbilicus: yes  Extremities: Normal, no cyanosis, clubbing, no edema extremities, peripheral pulses and reflexes normal    Meds:  Current Facility-Administered Medications   Medication Dose   • acetaminophen (TYLENOL) tablet 1,000 mg  1,000 mg   • ketorolac (TORADOL) injection 30 mg  30 mg   • oxyCODONE immediate-release (ROXICODONE) tablet 5 mg  5 mg   • oxyCODONE immediate release (ROXICODONE) tablet 10 mg  10 mg   • HYDROmorphone (Dilaudid) injection 0.2 mg  0.2 mg   • HYDROmorphone (Dilaudid) injection 0.4 mg  0.4 mg   • ePHEDrine injection 10 mg  10 mg   • ondansetron (ZOFRAN) syringe/vial injection 4 mg  4 mg   • diphenhydrAMINE (BENADRYL) injection 12.5 mg  12.5 mg   • naloxone (NARCAN) 0.4 mg in NS 1,000 mL infusion  0.4 mg   • diphenhydrAMINE (BENADRYL) injection 12.5 mg  12.5 mg    Or   • diphenhydrAMINE (BENADRYL) injection 25 mg  25 mg    Or   • naloxone (NARCAN) 0.4 mg in NS 1,000 mL infusion  0.4 mg   • LR infusion     •  PRN oxytocin (PITOCIN) (20 Units/1000 mL) PRN for excessive uterine bleeding - See Admin Instr  125-999 mL/hr   • miSOPROStol (CYTOTEC) tablet 600 mcg  600 mcg   • docusate sodium (COLACE) capsule 100 mg  100 mg   • bisacodyl (DULCOLAX) suppository 10 mg  10 mg   • simethicone (Mylicon) chewable tablet 125 mg  125 mg   • morphine (pf) 10 mg/mL injection 4 mg  4 mg   • diphenhydrAMINE (BENADRYL) tablet/capsule 25 mg  25 mg    Or   • diphenhydrAMINE (BENADRYL) injection 25 mg  25 mg   • magnesium sulfate 40 g/1000mL infusion  2 g/hr   • ibuprofen (MOTRIN) tablet 600 mg  600 mg   • ondansetron (ZOFRAN) syringe/vial injection 4 mg  4 mg    Or   • ondansetron (ZOFRAN ODT) dispertab 4 mg  4 mg   • ropivacaine 0.2 % (NAROPIN) injection     • oxytocin (PITOCIN) 20 UNITS/1000ML LR (postpartum)   mL/hr   • NS infusion     • magnesium sulfate 40 g/1000mL infusion  2 g/hr   • calcium GLUConate injection 1 g  1 g   • labetalol (NORMODYNE) tablet 200 mg  200 mg       Labs:   Recent Labs     21  1420 21  1645   WBC 6.8 7.5   RBC 3.70* 3.63*   HEMOGLOBIN 11.4* 11.1*   HEMATOCRIT 35.1* 34.3*   MCV 94.9 94.5   MCH 30.8 30.6   MCHC 32.5* 32.4*   RDW 65.5* 65.3*   PLATELETCT 222 225   MPV 11.8 11.4       Assessment:  Chief Admitting Dx:  Encounter for planned induction of labor [Z34.90]  Delivery Type:   for maternal exhaustion, OP presentation  Tubal Ligation:  no    Plan:  Continue routine post partum care.      Harriet Gonzalez D.O.

## 2021-11-22 NOTE — OR SURGEON
Immediate Post OP Note    During pushing, contractions spaced out despite pitocin and patient stated that she no longer wanted to push after 2.5 hours. Given possible macrosomia with AC> HC, decision was made to move forward with  section.  Discussed risks of procedure with patient including bleeding, infection, injury to surrounding organs.  Patient expressed understanding and wished to proceed.      PreOp Diagnosis: 1. IUP at 37w1d 2. Pre-eclampsia with severe features 3.maternal exhaustion 4. failure to descend      PostOp Diagnosis: same       Procedure(s):   SECTION, PRIMARY    Surgeon(s):  Harriet Gonzalez D.O.    Anesthesiologist/Type of Anesthesia:  Anesthesiologist: Cruz Garcia M.D./Spinal    Surgical Staff:  Circulator: Lynn Calderón R.N.  Relief Circulator: Alesia Hall R.N.    Specimens removed if any:  Placenta     Estimated Blood Loss: 600 cc    Findings: normal appearing uterus, ovaries, and fallopian tubes. Coke colored urine in forrester.     Complications: none        2021 7:45 PM Harriet Gonzalez D.O.

## 2021-11-22 NOTE — PROGRESS NOTES
0800: Assessment completed, fundus firm, lochia light, incision intact with dressing. Plan of care reviewed. Denies pain at this time, will call if intervention needed.     1530: Assisted patient to restroom with minimal assist. Tolerated well. Pericare completed.

## 2021-11-22 NOTE — OP REPORT
During pushing, contractions spaced out despite pitocin and patient stated that she no longer wanted to push after 2.5 hours. Given possible macrosomia with AC> HC, decision was made to move forward with  section.  Discussed risks of procedure with patient including bleeding, infection, injury to surrounding organs.  Patient expressed understanding and wished to proceed.      PreOp Diagnosis: 1. IUP at 37w1d 2. Pre-eclampsia with severe features 3.maternal exhaustion 4. failure to descend      PostOp Diagnosis: same       Procedure(s):   SECTION, PRIMARY    Surgeon(s):  Harriet Gonzalez D.O.    Anesthesiologist/Type of Anesthesia:  Anesthesiologist: Cruz Garcia M.D./Spinal    Surgical Staff:  Circulator: Lynn Calderón R.N.  Relief Circulator: Alesia Hall R.N.    Specimens removed if any:  Placenta     Estimated Blood Loss: 600 cc    Findings: normal appearing uterus, ovaries, and fallopian tubes. Coke colored urine in forrester.     Complications: none        Condition: Mother and baby tolerated procedure well and was taken to the recovery room awake and in stable condition.    Procedure:     The patient was taken to the operating room where spinal anesthesia was found to be adequate.  She was then prepped and draped in the normal sterile fashion in the dorsal supine position with a leftward tilt.  A Pfannenstiel skin incision was then made with a scalpel and carried through to the underlying fascia.  The fascia was incised in the midline and the incision extended laterally with Alcaraz scissors.  The superior aspect of the fascial incision was then grasped with Linda clamps, elevated, and the underlying rectus muscles dissected off bluntly.  Attention was then turned to the inferior aspect of the fascia which in a similar fashion was grasped, tented up with [place] clamps, and the rectus muscles dissected off bluntly.  Rectus muscles were then  in the midline and the peritoneum  identified tented up and entered sharply with the Metzenbaum scissors.  The peritoneal incision was then extended superiorly and inferiorly with good visualization of the bladder.The Edis retractor was then placed. The bladder reflection was then grasped with pickups and entered sharply with Metzenbaum scissors.  The incision was then extended and the bladder flap created digitally.  The lower uterine segment incised in a transverse fashion with the scalpel.  The uterine incision was then extended cephalad and caudad bluntly. The infant was then delivered atraumatically.  The nose and mouth were suctioned with bulb suction the cord doubly clamped and cut.  The infant was handed off to the waiting pediatricians.  The placenta was then removed with gentle traction and fundal massage.  The uterus was then cleared of all clots and debris.  The uterine incision was repaired with 0 Monocryl in a running locking fashion.  A second layer of the same suture was used for hemostasis and excellent hemostasis was appreciated.  Hysterotomy was found to be hemostatic at this time.  The gutters were cleared of all clots.  The uterus was then returned to the abdomen.  The peritoneum was closed using 2-0 Monocryl. The fascia was reapproximated with 0 Vicryl in a running fashion.  The subcuticular space was closed using a 3-0 Monocryl.  The skin was then closed using a 4-0 Monocryl.  The patient tolerated the procedure well.  Sponge lap and needle counts were correct x2.  500 mg Azithromycin and 2 g of Kefzol were given prior to the procedure.  The patient was taken to the recovery room awake and in stable condition.        [facility]

## 2021-11-22 NOTE — PROGRESS NOTES
Pt arrived via bed with belongings to S319.  Report received from Gus.  Pt oriented to room, call light & infant security.  Assessment done fundus firm, lochia light, vital signs stable.  IV patent on pump.  Zhu draining to gravity.  Intermittent sequentials stockings in place.  Incision dressing clean, dry & intact.  Instructions given on incentive spirometer and pt demonstrated/verbalized understanding.  Breast pump instructions given pt demonstrated/verbalized understanding.  Pt denies c/o dizziness, headache, blurry vision, epigastric pain or N&V.  Pt complains of incision pain, see MAR. Reviewed plan of care with pt & encouraged to call with needs. Call light in place

## 2021-11-22 NOTE — ANESTHESIA POSTPROCEDURE EVALUATION
Patient: Liza Pearson    Procedure Summary     Date: 21 Room / Location: [facility] OR 01 / SURGERY LABOR AND DELIVERY    Anesthesia Start: 905 Anesthesia Stop:     Procedure:  SECTION, PRIMARY (Abdomen) Diagnosis: (Failure to descend)    Surgeons: Harriet Gonzalez D.O. Responsible Provider: Cruz Garcia M.D.    Anesthesia Type: epidural ASA Status: 2          Final Anesthesia Type: epidural  Last vitals  BP   Blood Pressure: 127/74    Temp   36.4 °C (97.6 °F)    Pulse   73   Resp   16    SpO2   93 %      Anesthesia Post Evaluation    Patient location during evaluation: PACU  Patient participation: complete - patient participated  Level of consciousness: awake and alert  Pain score: 1    Airway patency: patent  Anesthetic complications: no  Cardiovascular status: hemodynamically stable  Respiratory status: acceptable  Hydration status: euvolemic    PONV: none          No complications documented.     Nurse Pain Score: 1 (NPRS)

## 2021-11-22 NOTE — LACTATION NOTE
This note was copied from a baby's chart.  Mom dorothy 27 y/o P1 who delivered baby boy weighing 8 # 2.8 oz at 37.1 wks. Baby is being cared for in the NICU for low blood sugars. Mom reports darker and slightly enlarged areolas during pregnancy. Mom denies any breast surgeries thyroid or fertility issues but does have GDM which she took insulin.   SIOBHAN visited mother in her room and educated don supply an demand and the importance of stimulating breast routinely Q 3 hours. Mom states he hasn't gotten any colostrum/milk and SIOBHAN explained that it is not uncommon too see this with NICU moms. Mom has not pumped since 08 this morning. Mom does not have a pump at home however she was recertified with WIC at bedside and the office [place] is aware that mother will needed a breast and called her this morning. SIOBHAN states that discharge will be perhaps Wednesday but mom can give WIC office a call when doctor writes the dc order. Handout given with WIC office number and [facility] was circled.   Follow up with lactation in the morning as needed.   Teressa # [id number]

## 2021-11-23 LAB
GLUCOSE BLD-MCNC: 111 MG/DL (ref 65–99)
GLUCOSE BLD-MCNC: 79 MG/DL (ref 65–99)
GLUCOSE BLD-MCNC: 89 MG/DL (ref 65–99)

## 2021-11-23 PROCEDURE — 770002 HCHG ROOM/CARE - OB PRIVATE (112)

## 2021-11-23 PROCEDURE — A9270 NON-COVERED ITEM OR SERVICE: HCPCS | Performed by: OBSTETRICS & GYNECOLOGY

## 2021-11-23 PROCEDURE — 700102 HCHG RX REV CODE 250 W/ 637 OVERRIDE(OP): Performed by: STUDENT IN AN ORGANIZED HEALTH CARE EDUCATION/TRAINING PROGRAM

## 2021-11-23 PROCEDURE — 700102 HCHG RX REV CODE 250 W/ 637 OVERRIDE(OP): Performed by: OBSTETRICS & GYNECOLOGY

## 2021-11-23 PROCEDURE — A9270 NON-COVERED ITEM OR SERVICE: HCPCS | Performed by: STUDENT IN AN ORGANIZED HEALTH CARE EDUCATION/TRAINING PROGRAM

## 2021-11-23 PROCEDURE — 82962 GLUCOSE BLOOD TEST: CPT | Mod: 91

## 2021-11-23 RX ORDER — FERROUS SULFATE 325(65) MG
325 TABLET ORAL
Status: DISCONTINUED | OUTPATIENT
Start: 2021-11-23 | End: 2021-11-25 | Stop reason: HOSPADM

## 2021-11-23 RX ADMIN — LABETALOL HYDROCHLORIDE 200 MG: 100 TABLET, FILM COATED ORAL at 05:43

## 2021-11-23 RX ADMIN — IBUPROFEN 600 MG: 600 TABLET ORAL at 19:46

## 2021-11-23 RX ADMIN — DOCUSATE SODIUM 100 MG: 100 CAPSULE ORAL at 08:22

## 2021-11-23 RX ADMIN — IBUPROFEN 600 MG: 600 TABLET ORAL at 08:22

## 2021-11-23 RX ADMIN — FERROUS SULFATE TAB 325 MG (65 MG ELEMENTAL FE) 325 MG: 325 (65 FE) TAB at 09:19

## 2021-11-23 RX ADMIN — LABETALOL HYDROCHLORIDE 200 MG: 100 TABLET, FILM COATED ORAL at 21:49

## 2021-11-23 RX ADMIN — LABETALOL HYDROCHLORIDE 200 MG: 100 TABLET, FILM COATED ORAL at 14:36

## 2021-11-23 ASSESSMENT — PAIN DESCRIPTION - PAIN TYPE
TYPE: SURGICAL PAIN
TYPE: ACUTE PAIN
TYPE: SURGICAL PAIN
TYPE: ACUTE PAIN

## 2021-11-23 ASSESSMENT — EDINBURGH POSTNATAL DEPRESSION SCALE (EPDS)
I HAVE BEEN ABLE TO LAUGH AND SEE THE FUNNY SIDE OF THINGS: AS MUCH AS I ALWAYS COULD
I HAVE BLAMED MYSELF UNNECESSARILY WHEN THINGS WENT WRONG: NO, NEVER
I HAVE BEEN SO UNHAPPY THAT I HAVE HAD DIFFICULTY SLEEPING: NOT AT ALL
THINGS HAVE BEEN GETTING ON TOP OF ME: NO, MOST OF THE TIME I HAVE COPED QUITE WELL
I HAVE FELT SAD OR MISERABLE: NO, NOT AT ALL
I HAVE LOOKED FORWARD WITH ENJOYMENT TO THINGS: AS MUCH AS I EVER DID
I HAVE FELT SCARED OR PANICKY FOR NO GOOD REASON: NO, NOT AT ALL
I HAVE BEEN SO UNHAPPY THAT I HAVE BEEN CRYING: NO, NEVER
THE THOUGHT OF HARMING MYSELF HAS OCCURRED TO ME: NEVER
I HAVE BEEN ANXIOUS OR WORRIED FOR NO GOOD REASON: NO, NOT AT ALL

## 2021-11-23 NOTE — PROGRESS NOTES
0800: Assessment completed, fundus firm, lochia light, incision intact with mepilex dressing. Plan of care reviewed. Denies pain at this time, will call if intervention needed.     1430: patient in NICU with Infant, unable to obtain postprandial glucose level

## 2021-11-23 NOTE — PROGRESS NOTES
Spiritual Care Note    Patient Information     Patient's Name: Liza Pearson   MRN: [id number]    YOB: 1995   Age and Gender: 26 y.o. female   Service Area: Post Partum   Room (and Bed): [place] [address]   Ethnicity or Nationality:    Primary Language: Thai   Spiritism/Spiritual preference: Confucianist   Place of Residence: [place]   Family/Friends/Others Present:    Clinical Team Present:    Medical Diagnosis(-es)/Procedure(s):    Code Status: Full Code    Date of Admission: 11/20/2021   Length of Stay: 2 days        Spiritual Care Provider Information:  Name of Spiritual Care Provider: Torin Roche  Title of Spiritual Care Provider: Manager  Phone Number: [phone]  E-mail: [email or web address]    minutes    Spiritual Screen Results:    Gen Nursing  Spiritual Screen  Was spiritual care education provided to the patient?: Yes     Palliative Care  PC Spiritism/Spiritual Screening  Was spiritual care education provided to the patient?: Yes      Encounter/Request Information  Encounter/Request Type   Visited With: Patient  Nature of the Visit: Initial,On shift  General Visit: Yes  Referral From/ Origin of Request: [facility] nursing  Referral To: Community clergy    Religous Needs/Values  Spiritism Needs Visit  Spiritism Needs: Prayer  Ritual Needs Visit  Ritual Needs: [place]    Spiritual Assessment        Notes:  Referral to community clergy for visit by Skye Cedeno saw patient 11/22/21 and offered prayer and a blessing.

## 2021-11-23 NOTE — CARE PLAN
The patient is Stable - Low risk of patient condition declining or worsening    Shift Goals  Clinical Goals: maintain stable vital signs    Progress made toward(s) clinical / shift goals:    Problem: Altered Physiologic Condition  Goal: Patient physiologically stable as evidenced by normal lochia, palpable uterine involution and vitals within normal limits  Note: Fundus firm lochia light     Problem: Early Mobilization - Post Surgery  Goal: Early mobilization post surgery  Note: Patient ambulated

## 2021-11-23 NOTE — CARE PLAN
The patient is Stable - Low risk of patient condition declining or worsening    Shift Goals  Clinical Goals: maintain stable vital signs    Progress made toward(s) clinical / shift goals:  visiting infant in NICU/pumping indep.    Patient is not progressing towards the following goals:

## 2021-11-23 NOTE — PROGRESS NOTES
Obstetrics & Gynecology Post-Delivery Progress Note    Date of Service    26 y.o.  s/p  for failure to descend, maternal exhaustion. Delivery complicated by preeclampsia   Delivery date: 21    Subjective  Pt reports   Pain: Yes, controlled with meds  Bleeding: lochia minimal  Tolerating PO: yes  Voiding: without difficulty  Ambulating: yes  Passing flatus: Yes  Feeding: breast and bottle feeding    She denied any headaches.  She denied changes in vision.  She denied right upper quadrant pain.    Objective  24hr VS:  Temp:  [36.6 °C (97.9 °F)-36.8 °C (98.2 °F)] 36.7 °C (98 °F)  Pulse:  [69-86] 69  Resp:  [17-18] 18  BP: (104-135)/(62-87) 135/84  SpO2:  [94 %-98 %] 97 %    Physical Exam  Gen: well  CV: RRR   Resp: unlabored respirations, no intercostal retractions or accessory muscle use  Fundus: firm and below umbilicus   Incision: dressing clean, dry, intact  Ext: symmetric and no edema, calves nontender    Labs:  Results for orders placed or performed during the hospital encounter of 21   Hold Blood Bank Specimen (Not Tested)   Result Value Ref Range    Holding Tube - Bb DONE    CBC WITH DIFFERENTIAL   Result Value Ref Range    WBC 6.8 4.8 - 10.8 K/uL    RBC 3.70 (L) 4.20 - 5.40 M/uL    Hemoglobin 11.4 (L) 12.0 - 16.0 g/dL    Hematocrit 35.1 (L) 37.0 - 47.0 %    MCV 94.9 81.4 - 97.8 fL    MCH 30.8 27.0 - 33.0 pg    MCHC 32.5 (L) 33.6 - 35.0 g/dL    RDW 65.5 (H) 35.9 - 50.0 fL    Platelet Count 222 164 - 446 K/uL    MPV 11.8 9.0 - 12.9 fL    Neutrophils-Polys 88.40 (H) 44.00 - 72.00 %    Lymphocytes 8.90 (L) 22.00 - 41.00 %    Monocytes 2.70 0.00 - 13.40 %    Eosinophils 0.00 0.00 - 6.90 %    Basophils 0.00 0.00 - 1.80 %    Nucleated RBC 0.00 /100 WBC    Neutrophils (Absolute) 6.01 2.00 - 7.15 K/uL    Lymphs (Absolute) 0.61 (L) 1.00 - 4.80 K/uL    Monos (Absolute) 0.18 0.00 - 0.85 K/uL    Eos (Absolute) 0.00 0.00 - 0.51 K/uL    Baso (Absolute) 0.00 0.00 - 0.12 K/uL    NRBC (Absolute) 0.00  K/uL    Anisocytosis 2+ (A)     Macrocytosis 1+     Microcytosis 1+    ASPARTATE AMINO-GUPTA   Result Value Ref Range    AST(SGOT) 12 12 - 45 U/L   ALANINE AMINO-TRANS   Result Value Ref Range    ALT(SGPT) 17 2 - 50 U/L   URINE CREATININE RANDOM   Result Value Ref Range    Creatinine, Random Urine 124.05 mg/dL   URINE PROTEIN   Result Value Ref Range    Total Protein, Urine 24.0 (H) 0.0 - 15.0 mg/dL   CREATININE   Result Value Ref Range    Creatinine 0.48 (L) 0.50 - 1.40 mg/dL   CBC WITHOUT DIFFERENTIAL   Result Value Ref Range    WBC 7.5 4.8 - 10.8 K/uL    RBC 3.63 (L) 4.20 - 5.40 M/uL    Hemoglobin 11.1 (L) 12.0 - 16.0 g/dL    Hematocrit 34.3 (L) 37.0 - 47.0 %    MCV 94.5 81.4 - 97.8 fL    MCH 30.6 27.0 - 33.0 pg    MCHC 32.4 (L) 33.6 - 35.0 g/dL    RDW 65.3 (H) 35.9 - 50.0 fL    Platelet Count 225 164 - 446 K/uL    MPV 11.4 9.0 - 12.9 fL   DIFFERENTIAL MANUAL   Result Value Ref Range    Manual Diff Status PERFORMED    PERIPHERAL SMEAR REVIEW   Result Value Ref Range    Peripheral Smear Review see below    PLATELET ESTIMATE   Result Value Ref Range    Plt Estimation Normal    MORPHOLOGY   Result Value Ref Range    RBC Morphology Present    ESTIMATED GFR   Result Value Ref Range    GFR If African American >60 >60 mL/min/1.73 m 2    GFR If Non African American >60 >60 mL/min/1.73 m 2   Comp Metabolic Panel   Result Value Ref Range    Sodium 136 135 - 145 mmol/L    Potassium 4.0 3.6 - 5.5 mmol/L    Chloride 107 96 - 112 mmol/L    Co2 17 (L) 20 - 33 mmol/L    Anion Gap 12.0 7.0 - 16.0    Glucose 74 65 - 99 mg/dL    Bun 8 8 - 22 mg/dL    Creatinine 0.48 (L) 0.50 - 1.40 mg/dL    Calcium 8.7 8.5 - 10.5 mg/dL    AST(SGOT) 12 12 - 45 U/L    ALT(SGPT) 17 2 - 50 U/L    Alkaline Phosphatase 161 (H) 30 - 99 U/L    Total Bilirubin 0.2 0.1 - 1.5 mg/dL    Albumin 3.2 3.2 - 4.9 g/dL    Total Protein 6.7 6.0 - 8.2 g/dL    Globulin 3.5 1.9 - 3.5 g/dL    A-G Ratio 0.9 g/dL   SARS-COV Antigen JULIO CÉSAR: Collect dry nasal swab   Result Value  Ref Range    SARS-CoV-2 Source Nasal Swab     SARS-COV ANTIGEN JULIO CÉSAR NotDetected NotDetected   MAGNESIUM   Result Value Ref Range    Magnesium 6.2 (HH) 1.5 - 2.5 mg/dL   Comp Metabolic Panel   Result Value Ref Range    Sodium 131 (L) 135 - 145 mmol/L    Potassium 4.1 3.6 - 5.5 mmol/L    Chloride 99 96 - 112 mmol/L    Co2 17 (L) 20 - 33 mmol/L    Anion Gap 15.0 7.0 - 16.0    Glucose 174 (H) 65 - 99 mg/dL    Bun 8 8 - 22 mg/dL    Creatinine 0.69 0.50 - 1.40 mg/dL    Calcium 6.8 (LL) 8.5 - 10.5 mg/dL    AST(SGOT) 15 12 - 45 U/L    ALT(SGPT) 12 2 - 50 U/L    Alkaline Phosphatase 152 (H) 30 - 99 U/L    Total Bilirubin 0.3 0.1 - 1.5 mg/dL    Albumin 3.2 3.2 - 4.9 g/dL    Total Protein 6.2 6.0 - 8.2 g/dL    Globulin 3.0 1.9 - 3.5 g/dL    A-G Ratio 1.1 g/dL   MAGNESIUM   Result Value Ref Range    Magnesium 6.1 (HH) 1.5 - 2.5 mg/dL   ESTIMATED GFR   Result Value Ref Range    GFR If African American >60 >60 mL/min/1.73 m 2    GFR If Non African American >60 >60 mL/min/1.73 m 2   SERUM MAGNESIUM LEVELS EVERY 6 HRS UNTIL DESIRED RANGE (5-8 MG/DL) ACHIEVED   Result Value Ref Range    Magnesium 5.9 (HH) 1.5 - 2.5 mg/dL   SERUM MAGNESIUM LEVELS EVERY 6 HRS UNTIL DESIRED RANGE (5-8 MG/DL) ACHIEVED   Result Value Ref Range    Magnesium 6.7 (HH) 1.5 - 2.5 mg/dL   CBC without differential   Result Value Ref Range    WBC 15.0 (H) 4.8 - 10.8 K/uL    RBC 3.07 (L) 4.20 - 5.40 M/uL    Hemoglobin 9.4 (L) 12.0 - 16.0 g/dL    Hematocrit 29.4 (L) 37.0 - 47.0 %    MCV 95.8 81.4 - 97.8 fL    MCH 30.6 27.0 - 33.0 pg    MCHC 32.0 (L) 33.6 - 35.0 g/dL    RDW 67.7 (H) 35.9 - 50.0 fL    Platelet Count 207 164 - 446 K/uL    MPV 11.0 9.0 - 12.9 fL   SERUM MAGNESIUM LEVELS DAILY X 3 DAYS, STARTING AFTER DESIRED MAGNESIUM LEVEL ACHIEVED   Result Value Ref Range    Magnesium 6.5 (HH) 1.5 - 2.5 mg/dL   Histology Request   Result Value Ref Range    Pathology Request Sent to [facility]    POCT glucose device results   Result Value Ref Range    Glucose - Accu-Ck 76  65 - 99 mg/dL   POCT glucose device results   Result Value Ref Range    Glucose - Accu-Ck 97 65 - 99 mg/dL   POCT glucose device results   Result Value Ref Range    Glucose - Accu-Ck 109 (H) 65 - 99 mg/dL   POCT glucose device results   Result Value Ref Range    Glucose - Accu-Ck 97 65 - 99 mg/dL   POCT glucose device results   Result Value Ref Range    Glucose - Accu-Ck 87 65 - 99 mg/dL   POCT glucose device results   Result Value Ref Range    Glucose - Accu-Ck 100 (H) 65 - 99 mg/dL   POCT glucose device results   Result Value Ref Range    Glucose - Accu-Ck 102 (H) 65 - 99 mg/dL   POCT glucose device results   Result Value Ref Range    Glucose - Accu-Ck 94 65 - 99 mg/dL   POCT glucose device results   Result Value Ref Range    Glucose - Accu-Ck 92 65 - 99 mg/dL   POCT glucose device results   Result Value Ref Range    Glucose - Accu-Ck 119 (H) 65 - 99 mg/dL   POCT glucose device results   Result Value Ref Range    Glucose - Accu-Ck 115 (H) 65 - 99 mg/dL   POCT glucose device results   Result Value Ref Range    Glucose - Accu-Ck 168 (H) 65 - 99 mg/dL   POCT glucose device results   Result Value Ref Range    Glucose - Accu-Ck 79 65 - 99 mg/dL         Medications  ferrous sulfate, 325 mg, Oral, QDAY with Breakfast  ibuprofen, 600 mg, Oral, Q6HRS  labetalol, 200 mg, Oral, Q8HRS      LR, oxytocin, misoprostol, docusate sodium, bisacodyl, simethicone, morphine injection, diphenhydrAMINE **OR** diphenhydrAMINE, ondansetron **OR** ondansetron      Assessment/Plan  Liza Pearson is a 26 y.o.yo  postpartum day #2  s/p  for failure to descend, maternal exhaustion. Delivery complicated by preeclampsia for which she was administered mag.    - Post care: meeting all goals  - Iron sulfate for Hemoglobin of 9.4 on 21  - Pain: controlled  - Rh+, Rubella Immune  - Method of Feeding: plans to breastfeed, plans to bottle feed  - Method of Contraception: nothing  VTE prophylaxis: none indicated    -  Disposition: likely home PPD3    Yamini Amato M.D.

## 2021-11-23 NOTE — LACTATION NOTE
This note was copied from a baby's chart.  LC follow up visit : mom states he has continued to pump with the schedule the LC placed on whiteboard yesterday.Mom states she has not gotten any collectable colostrum Encouraged mother to continue to pump as instructed and praised her efforts..

## 2021-11-24 LAB
GLUCOSE BLD-MCNC: 128 MG/DL (ref 65–99)
GLUCOSE BLD-MCNC: 74 MG/DL (ref 65–99)
GLUCOSE BLD-MCNC: 99 MG/DL (ref 65–99)

## 2021-11-24 PROCEDURE — A9270 NON-COVERED ITEM OR SERVICE: HCPCS | Performed by: NURSE PRACTITIONER

## 2021-11-24 PROCEDURE — 700102 HCHG RX REV CODE 250 W/ 637 OVERRIDE(OP): Performed by: PHYSICIAN ASSISTANT

## 2021-11-24 PROCEDURE — 770002 HCHG ROOM/CARE - OB PRIVATE (112)

## 2021-11-24 PROCEDURE — A9270 NON-COVERED ITEM OR SERVICE: HCPCS | Performed by: OBSTETRICS & GYNECOLOGY

## 2021-11-24 PROCEDURE — 700102 HCHG RX REV CODE 250 W/ 637 OVERRIDE(OP): Performed by: OBSTETRICS & GYNECOLOGY

## 2021-11-24 PROCEDURE — 82962 GLUCOSE BLOOD TEST: CPT

## 2021-11-24 PROCEDURE — 700102 HCHG RX REV CODE 250 W/ 637 OVERRIDE(OP): Performed by: NURSE PRACTITIONER

## 2021-11-24 PROCEDURE — 700102 HCHG RX REV CODE 250 W/ 637 OVERRIDE(OP): Performed by: STUDENT IN AN ORGANIZED HEALTH CARE EDUCATION/TRAINING PROGRAM

## 2021-11-24 PROCEDURE — A9270 NON-COVERED ITEM OR SERVICE: HCPCS | Performed by: STUDENT IN AN ORGANIZED HEALTH CARE EDUCATION/TRAINING PROGRAM

## 2021-11-24 PROCEDURE — A9270 NON-COVERED ITEM OR SERVICE: HCPCS | Performed by: PHYSICIAN ASSISTANT

## 2021-11-24 RX ORDER — IBUPROFEN 600 MG/1
600 TABLET ORAL EVERY 6 HOURS PRN
Status: DISCONTINUED | OUTPATIENT
Start: 2021-11-24 | End: 2021-11-25 | Stop reason: HOSPADM

## 2021-11-24 RX ORDER — NIFEDIPINE 30 MG/1
30 TABLET, EXTENDED RELEASE ORAL DAILY
Status: DISCONTINUED | OUTPATIENT
Start: 2021-11-24 | End: 2021-11-25 | Stop reason: HOSPADM

## 2021-11-24 RX ORDER — NIFEDIPINE 10 MG/1
10 CAPSULE ORAL EVERY 8 HOURS
Status: DISCONTINUED | OUTPATIENT
Start: 2021-11-24 | End: 2021-11-25

## 2021-11-24 RX ORDER — OXYCODONE HYDROCHLORIDE 5 MG/1
5 TABLET ORAL EVERY 4 HOURS PRN
Status: DISCONTINUED | OUTPATIENT
Start: 2021-11-24 | End: 2021-11-25 | Stop reason: HOSPADM

## 2021-11-24 RX ADMIN — IBUPROFEN 600 MG: 600 TABLET ORAL at 21:22

## 2021-11-24 RX ADMIN — IBUPROFEN 600 MG: 600 TABLET ORAL at 05:45

## 2021-11-24 RX ADMIN — OXYCODONE 5 MG: 5 TABLET ORAL at 21:22

## 2021-11-24 RX ADMIN — FERROUS SULFATE TAB 325 MG (65 MG ELEMENTAL FE) 325 MG: 325 (65 FE) TAB at 09:24

## 2021-11-24 RX ADMIN — NIFEDIPINE 10 MG: 10 CAPSULE ORAL at 17:24

## 2021-11-24 RX ADMIN — LABETALOL HYDROCHLORIDE 200 MG: 100 TABLET, FILM COATED ORAL at 09:23

## 2021-11-24 RX ADMIN — NIFEDIPINE 30 MG: 30 TABLET, FILM COATED, EXTENDED RELEASE ORAL at 17:24

## 2021-11-24 ASSESSMENT — PAIN DESCRIPTION - PAIN TYPE
TYPE: ACUTE PAIN
TYPE: ACUTE PAIN

## 2021-11-24 NOTE — PROGRESS NOTES
Assessment complete. Fundus firm, lochia light. VSS. Tolerating diet. Voiding without difficulty. Incision dressing CDI. Pt states passing gas. Pain controlled with prn medications per mar. Will offer pain medications as they become available. FOB at bedside, bonding with pt. POC discussed with pt. Encouraged to call with needs. Call light in place.

## 2021-11-24 NOTE — PROGRESS NOTES
0800: Assessment completed, fundus firm, lochia light, incision intact with mepilex dressing. Plan of care reviewed. Denies pain at this time, will call if intervention needed.     1440: Patient in NICU, unable to obtain postprandial blood glucose level    1620: notified DERRICK Salmon of patient's last two BP and HR. Orders to discontinue Labetalol and begin Procardia.

## 2021-11-24 NOTE — PROGRESS NOTES
POSTOPERATIVE  SECTION PROGRESS NOTE;        PATIENT ID:  NAME:  Liza Pearson  MRN:               [id number]  YOB: 1995         26 y.o. female  at 37w1d POD#3 s/p primary  section-infant in NICU      Subjective: Doing well no complaints    Objective:    Vitals:    21 1347 21 1827 21 2200 21 0200   BP: 143/81 136/77 156/72 143/75   Pulse: 72 60 93 75   Resp: 18 16 19 18   Temp: 37.1 °C (98.7 °F) 37.1 °C (98.7 °F) 36.9 °C (98.5 °F) 36.3 °C (97.4 °F)   TempSrc: Temporal Temporal Temporal Temporal   SpO2: 98% 98% 98% 97%   Weight:       Height:         General: No acute distress, resting comfortably in bed.  HEENT: normocephalic, nontraumatic, PERRLA, EOMI  Cardiovascular: Heart RRR with no murmurs, rubs or gallops. Distal Pulses 2+  Respiratory: symmetric chest expansion, lungs CTA bilaterally with no wheezes rales or rhonci  Abdomen: soft, mildly tender around incision which is clean, dry and intact, fundus firm, +BS  Genitourinary: lochia light, denies excessive vaginal bleeding  Musculoskeletal: strength 5/5 in four extremities  Neuro: non focal with no numbness, tingling or changes in sensation      Recent Labs     21  0658   WBC 15.0*   RBC 3.07*   HEMOGLOBIN 9.4*   HEMATOCRIT 29.4*   MCV 95.8   MCH 30.6   RDW 67.7*   PLATELETCT 207   MPV 11.0     Recent Labs     21  1921   SODIUM 131*   POTASSIUM 4.1   CHLORIDE 99   CO2 17*   GLUCOSE 174*   BUN 8       Current Meds:   Current Facility-Administered Medications   Medication Dose Frequency Provider Last Rate Last Admin   • ferrous sulfate tablet 325 mg  325 mg QDAY with Breakfast Yamini Amato M.D.   325 mg at 21 0919   • LR infusion   PRN TIFFANY MorrisODilia 75 mL/hr at 21 0500 Rate Verify at 21 0500   • PRN oxytocin (PITOCIN) (20 Units/1000 mL) PRN for excessive uterine bleeding - See Admin Instr  125-999 mL/hr Once PRN Harriet Gonzalez D.O.       • miSOPROStol  (CYTOTEC) tablet 600 mcg  600 mcg Once PRN ADEBAYO Morris.O.       • docusate sodium (COLACE) capsule 100 mg  100 mg BID PRN ADEBAYO Morris.ODilia   100 mg at 21 0822   • bisacodyl (DULCOLAX) suppository 10 mg  10 mg PRN ADEBAYO Morris.O.       • simethicone (Mylicon) chewable tablet 125 mg  125 mg 4X/DAY PRN ADEBAYO Morris.O.       • morphine (pf) 10 mg/mL injection 4 mg  4 mg Q3HRS PRN ADEBAYO Morris.O.       • diphenhydrAMINE (BENADRYL) tablet/capsule 25 mg  25 mg Q6HRS PRN ADEBAYO Morris.O.        Or   • diphenhydrAMINE (BENADRYL) injection 25 mg  25 mg Q6HRS PRN ADEBAYO Morris.O.       • ibuprofen (MOTRIN) tablet 600 mg  600 mg Q6HRS ADEBAYO Morris.O.   600 mg at 21 0545   • ondansetron (ZOFRAN) syringe/vial injection 4 mg  4 mg Q6HRS PRN ADEBAYO Morris.O.        Or   • ondansetron (ZOFRAN ODT) dispertab 4 mg  4 mg Q6HRS PRN ADEBAYO Morris.O.       • ropivacaine 0.2 % (NAROPIN) injection   Continuous Cruz Garcia M.D.   200 mg at 21 09   • oxytocin (PITOCIN) 20 UNITS/1000ML LR (postpartum)   mL/hr Continuous TIFFANY MorrisODilia 125 mL/hr at 21 125 mL/hr at 21   • labetalol (NORMODYNE) tablet 200 mg  200 mg Q8HRS ADEBAYO Donaldson.ODilia   200 mg at 21 0545   Last reviewed on 2021  7:51 PM by Lynn M Stephane, R.N.          Assessment:  26 y.o. female  at 37w1d POD#3 s/p primary  section for arrest/PIH    Plan:   1. Routine care  2. Discharge to home POD 4    Jose L Zavala MD

## 2021-11-24 NOTE — CARE PLAN
The patient is Stable - Low risk of patient condition declining or worsening    Shift Goals  Clinical Goals: maintain stable vital signs    Progress made toward(s) clinical / shift goals:      Problem: Altered Physiologic Condition  Goal: Patient physiologically stable as evidenced by normal lochia, palpable uterine involution and vitals within normal limits  Note: Lochia light, fundus firm     Problem: Infection - Postpartum  Goal: Postpartum patient will be free of signs and symptoms of infection  Note: Patient not exhibiting symptoms of infection

## 2021-11-25 ENCOUNTER — PHARMACY VISIT (OUTPATIENT)
Dept: PHARMACY | Facility: MEDICAL CENTER | Age: 26
End: 2021-11-25
Payer: MEDICARE

## 2021-11-25 VITALS
HEART RATE: 88 BPM | SYSTOLIC BLOOD PRESSURE: 136 MMHG | RESPIRATION RATE: 18 BRPM | WEIGHT: 184 LBS | DIASTOLIC BLOOD PRESSURE: 92 MMHG | OXYGEN SATURATION: 98 % | HEIGHT: 64 IN | BODY MASS INDEX: 31.41 KG/M2 | TEMPERATURE: 97.6 F

## 2021-11-25 LAB — GLUCOSE BLD-MCNC: 76 MG/DL (ref 65–99)

## 2021-11-25 PROCEDURE — A9270 NON-COVERED ITEM OR SERVICE: HCPCS | Performed by: NURSE PRACTITIONER

## 2021-11-25 PROCEDURE — A9270 NON-COVERED ITEM OR SERVICE: HCPCS | Performed by: STUDENT IN AN ORGANIZED HEALTH CARE EDUCATION/TRAINING PROGRAM

## 2021-11-25 PROCEDURE — A9270 NON-COVERED ITEM OR SERVICE: HCPCS | Performed by: OBSTETRICS & GYNECOLOGY

## 2021-11-25 PROCEDURE — 700102 HCHG RX REV CODE 250 W/ 637 OVERRIDE(OP): Performed by: PHYSICIAN ASSISTANT

## 2021-11-25 PROCEDURE — RXMED WILLOW AMBULATORY MEDICATION CHARGE: Performed by: OBSTETRICS & GYNECOLOGY

## 2021-11-25 PROCEDURE — 82962 GLUCOSE BLOOD TEST: CPT

## 2021-11-25 PROCEDURE — 700102 HCHG RX REV CODE 250 W/ 637 OVERRIDE(OP): Performed by: NURSE PRACTITIONER

## 2021-11-25 PROCEDURE — 700102 HCHG RX REV CODE 250 W/ 637 OVERRIDE(OP): Performed by: OBSTETRICS & GYNECOLOGY

## 2021-11-25 PROCEDURE — A9270 NON-COVERED ITEM OR SERVICE: HCPCS | Performed by: PHYSICIAN ASSISTANT

## 2021-11-25 PROCEDURE — 700102 HCHG RX REV CODE 250 W/ 637 OVERRIDE(OP): Performed by: STUDENT IN AN ORGANIZED HEALTH CARE EDUCATION/TRAINING PROGRAM

## 2021-11-25 RX ORDER — NIFEDIPINE 30 MG
30 TABLET, EXTENDED RELEASE ORAL DAILY
Qty: 30 TABLET | Refills: 0 | Status: SHIPPED | OUTPATIENT
Start: 2021-11-26 | End: 2021-12-09

## 2021-11-25 RX ORDER — IBUPROFEN 800 MG/1
800 TABLET ORAL EVERY 6 HOURS PRN
Qty: 60 TABLET | Refills: 0 | Status: SHIPPED | OUTPATIENT
Start: 2021-11-25 | End: 2022-02-13

## 2021-11-25 RX ORDER — PSEUDOEPHEDRINE HCL 30 MG
100 TABLET ORAL 2 TIMES DAILY PRN
Qty: 60 CAPSULE | Refills: 0 | Status: SHIPPED | OUTPATIENT
Start: 2021-11-25 | End: 2022-02-13

## 2021-11-25 RX ORDER — ACETAMINOPHEN 500 MG
1000 TABLET ORAL EVERY 6 HOURS PRN
Qty: 30 TABLET | Refills: 0 | Status: SHIPPED | OUTPATIENT
Start: 2021-11-25 | End: 2022-02-13

## 2021-11-25 RX ORDER — OXYCODONE HYDROCHLORIDE 5 MG/1
5 TABLET ORAL EVERY 6 HOURS PRN
Qty: 20 TABLET | Refills: 0 | Status: SHIPPED | OUTPATIENT
Start: 2021-11-25 | End: 2021-11-30

## 2021-11-25 RX ORDER — ACETAMINOPHEN AND CODEINE PHOSPHATE 120; 12 MG/5ML; MG/5ML
1 SOLUTION ORAL DAILY
Qty: 28 TABLET | Refills: 0 | Status: SHIPPED | OUTPATIENT
Start: 2021-12-09 | End: 2022-01-03 | Stop reason: SDUPTHER

## 2021-11-25 RX ADMIN — NIFEDIPINE 10 MG: 10 CAPSULE ORAL at 02:15

## 2021-11-25 RX ADMIN — OXYCODONE 5 MG: 5 TABLET ORAL at 08:35

## 2021-11-25 RX ADMIN — IBUPROFEN 600 MG: 600 TABLET ORAL at 08:34

## 2021-11-25 RX ADMIN — IBUPROFEN 600 MG: 600 TABLET ORAL at 02:15

## 2021-11-25 RX ADMIN — FERROUS SULFATE TAB 325 MG (65 MG ELEMENTAL FE) 325 MG: 325 (65 FE) TAB at 08:34

## 2021-11-25 RX ADMIN — NIFEDIPINE 30 MG: 30 TABLET, FILM COATED, EXTENDED RELEASE ORAL at 06:46

## 2021-11-25 RX ADMIN — DOCUSATE SODIUM 100 MG: 100 CAPSULE ORAL at 08:35

## 2021-11-25 ASSESSMENT — PAIN DESCRIPTION - PAIN TYPE: TYPE: ACUTE PAIN;SURGICAL PAIN

## 2021-11-25 NOTE — LACTATION NOTE
This note was copied from a baby's chart.  Mom reports she hasn't pumped except once. Reports no discomfort. LC encouraged Q 3 hours. Mom verbally agrees

## 2021-11-25 NOTE — CARE PLAN
The patient is Stable - Low risk of patient condition declining or worsening    Shift Goals  Clinical Goals: maintain stable vital signs    Progress made toward(s) clinical / shift goals:      Problem: Altered Physiologic Condition  Goal: Patient physiologically stable as evidenced by normal lochia, palpable uterine involution and vitals within normal limits  Note: Fundus firm, lochia light     Problem: Early Mobilization - Post Surgery  Goal: Early mobilization post surgery  Note: Patient ambulating

## 2021-11-25 NOTE — PROGRESS NOTES
Report received from Yun ROBERTSON and Radha RN. Pt assessment complete. Pt states her pain is a 5 at her incision. Pt only has Motrin ordered; will call the provider to ask for moderate pain medication. Reviewed PC for this evening.  Pt finished eating dinner at 2000, will check her FSBS shortly. Pt is ambulating in the room and voiding without difficulty. Call light is within reach. Advised pt to call with needs at any time.     Call placed to faye hinkle and received a telephone order for Roxicodone 5 mg PO q4h PRN for moderate pain and modified Motrin 600 mg PO to q6h PRN for mild pain.

## 2021-11-25 NOTE — DISCHARGE INSTRUCTIONS
PATIENT DISCHARGE EDUCATION INSTRUCTION SHEET  REASONS TO CALL YOUR OBSTETRICIAN  · Persistent fever, shaking, chills (Temperature higher than 100.4) may indicate you have an infection  · Heavy bleeding: soaking more than 1 pad per hour; Passing clots an egg-sized clot or bigger may mean you have an postpartum hemorrhage  · Foul odor from vagina or bad smelling or discolored discharge or blood  · Breast infection (Mastitis symptoms); breast pain, chills, fever, redness or red streaks, may feel flu like symptoms  · Urinary pain, burning or frequency  · Incision that is not healing, increased redness, swelling, tenderness or pain, or any pus from episiotomy or  site may mean you have an infection  · Redness, swelling, warmth, or painful to touch in the calf area of your leg may mean you have a blood clot  · Severe or intensified depression, thoughts or feelings of wanting to hurt yourself or someone else   · Pain in chest, obstructed breathing or shortness of breath (trouble catching your breath) may mean you are having a postpartum complication. Call your provider immediately   · Headache that does not get better, even after taking medicine, a bad headache with vision changes or pain in the upper right area of your belly may mean you have high blood pressure or post birth preeclampsia. Call your provider immediately    HAND WASHING  All family and friends should wash their hands:  · Before and after holding the baby  · Before feeding the baby  · After using the restroom or changing the baby's diaper    WOUND CARE  Ask your physician for additional care instructions. In general:  ·  Incision:  · May shower and pat incision dry   · Keep the incision clean and dry  · There should not be any opening or pus from the incision  · Continue to walk at home 3 times a day   · Do NOT lift anything heavier than your baby (over 10 pounds)  · Encourage family to help participate in care of the  to allow  rest and mom time to heal  · Episiotomy/Laceration  · May use pedro-spray bottle, witch hazel pads and dermaplast spray for comfort  · Use pedro-spray bottle after urinating to cleanse perineal area  · To prevent burning during urination spray pedro-water bottle on labial area   · Pat perineal area dry until episiotomy/laceration is healed  · Continue to use pedro-bottle until bleeding stops as needed  · If have a 2nd degree laceration or greater, a Sitz bath can offer relief from soreness, burning, and inflammation   · Sitz Bath   · Sit in 6 inches of warm water and soak laceration as needed until the laceration heals    VAGINAL CARE AND BLEEDING  · Nothing inside vagina for 6 weeks:   · No sexual intercourse, tampons or douching  · Bleeding may continue for 2-4 weeks. Amount and color may vary  · Soaking 1 pad or more in an hour for several hours is considered heavy bleeding  · Passing large egg sized blood clots can be concerning  · If you feel like you have heavy bleeding or are having increasing amount of blood clots call your Obstetrician immediately  · If you begin feeling faint upon standing, feeling sick to your stomach, have clammy skin, a really fast heartbeat, have chills, start feeling confused, dizzy, sleepy or weak, or feeling like you're going to faint call your Obstetrician immediately    HYPERTENSION   Preeclampsia or gestational hypertension are types of high blood pressure that only pregnant women can get. It is important for you to be aware of symptoms to seek early intervention and treatment. If you have any of these symptoms immediately call your Obstetrician    · Vision changes or blurred vision   · Severe headache or pain that is unrelieved with medication and will not go away  · Persistent pain in upper abdomen or shoulder   · Increased swelling of face, feet, or hands  · Difficulty breathing or shortness of breath at rest  · Urinating less than usual    URINATION AND BOWEL MOVEMENTS  · Eating  "more fiber (bran cereal, fruits, and vegetables) and drinking plenty of fluids will help to avoid constipation  · Urinary frequency and urgency after childbirth is normal  · If you experience any urinary pain, burning or frequency call your provider    BIRTH CONTROL  · It is possible to become pregnant at any time after delivery and while breastfeeding  · Plan to discuss a method of birth control with your physician at your post delivery follow up visit    POSTPARTUM BLUES  During the first few days after birth, you may experience a sense of the \"blues\" which may include impatience, irritability or even crying. These feelings come and go quickly. However, as many as 1 in 10 women experience emotional symptoms known as postpartum depression.     POSTPARTUM DEPRESSION    May start as early as the second or third day after delivery or take several weeks or months to develop. Symptoms of \"blues\" are present, but are more intense: Crying spells; loss of appetite; feelings of hopelessness or loss of control; fear of touching the baby; over concern or no concern at all about the baby; little or no concern about your own appearance/caring for yourself; and/or inability to sleep or excessive sleeping. Contact your Obstetrician if you are experiencing any of these symptoms     PREVENTING SHAKEN BABY  If you are angry or stressed, PUT THE BABY IN THE CRIB, step away, take some deep breaths, and wait until you are calm to care for the baby. DO NOT SHAKE THE BABY. You are not alone, call a supporter for help.  · [facility] 24/7 crisis call line (418-625-6536) or (1-607.245.3998)  · You can also text them, text \"ANSWER\" (055554)      "

## 2021-11-25 NOTE — PROGRESS NOTES
Spoke with Dr. Amato regarding pt receiving Procardia 30 mg dose this morning with recent BP being 125/70 and last dose of Procardia 10 mg given at 0215. Per Dr. Amato, it is ok to give Procardia 30 mg as scheduled.

## 2021-11-25 NOTE — CARE PLAN
The patient is Stable - Low risk of patient condition declining or worsening    Shift Goals  Clinical Goals: remain clincally stable    Progress made toward(s) clinical / shift goals:  Patient will ask for pain medication when needed.  Patient has scant to light lochia with a firm palpable uterus.  Vital signs are within defined limits.  Assessment will continue.     Patient is not progressing towards the following goals: na

## 2021-11-25 NOTE — CARE PLAN
The patient is Stable - Low risk of patient condition declining or worsening    Shift Goals  Clinical Goals: maintain tolerable pain level    Progress made toward(s) clinical / shift goals:  pt pain has gone down from start of shift which was a 5. Pt pain level at this time is 0. Pain level made tolerable with the use of PRN pain medication.     Patient is not progressing towards the following goals:

## 2021-11-25 NOTE — DISCHARGE SUMMARY
"Discharge Summary    CHIEF COMPLAINT ON ADMISSION  Chief Complaint   Patient presents with   • Scheduled Induction       Reason for Admission  Pregnancy     Admission Date  11/20/2021    CODE STATUS  Full Code    HPI & HOSPITAL COURSE  This is a 26 y.o. female here with IOL for gestational hypertension, 2VC, gdma2.  She developed severe preeclampsia on admission and was given magnesium through delivery and for 24hrs afterward.  Her pressures were well controlled, then on day 3 began to elevate.  Her labetalol was held for low pulse, so it was changed to procardia.  She has had well controlled Bps since then and will be discharged home. Baby is in NICU for blood sugar issues.  She is meeting all goals and ready for discharge.     No notes on file    Therefore, she is discharged in good and stable condition to home with close outpatient follow-up.    The patient met 2-midnight criteria for an inpatient stay at the time of discharge.    Discharge Date  11/25/2021    FOLLOW UP ITEMS POST DISCHARGE  BP check in office next week    DISCHARGE DIAGNOSES  Active Problems:    * No active hospital problems. *  Resolved Problems:    * No resolved hospital problems. *      FOLLOW UP  No future appointments.  [facility]  [address]  [facility] [address] [phone]  In 1 week  blood pressure check    [place]  [address]  [facility] [address] [phone]            MEDICATIONS ON DISCHARGE     Medication List      CONTINUE taking these medications      Instructions   Alcohol Swabs Pads   Wipe site with prep pad prior to injection.     BD Insulin Syringe U/F 31G X 5/16\" 0.3 ML Misc  Generic drug: Insulin Syringe-Needle U-100   Doctor's comments: Per formulary preference.  use with 5 units of NPH at bedtime (pt needs insulin education)        ASK your doctor about these medications      Instructions   ferrous sulfate 325 (65 Fe) MG tablet   " Take 325 mg by mouth every day.  Dose: 325 mg     NovoLIN N 100 UNIT/ML Susp  Generic drug: insulin NPH   Inyecte 5 unidades subcutaneo por las noches  (Inject 5 Units under the skin at bedtime.)  Dose: 5 Units     Prenatal Vitamin and Mineral 28-0.8 MG Tabs   Take  by mouth.            Allergies  No Known Allergies    DIET  Orders Placed This Encounter   Procedures   • Diet Order Diet: New Mom     Standing Status:   Standing     Number of Occurrences:   1     Order Specific Question:   Diet:     Answer:   New Mom [20]       ACTIVITY  Light duty.  20-lb lifting restriction    CONSULTATIONS  none    PROCEDURES  Primary  section    LABORATORY  Lab Results   Component Value Date    SODIUM 131 (L) 2021    POTASSIUM 4.1 2021    CHLORIDE 99 2021    CO2 17 (L) 2021    GLUCOSE 174 (H) 2021    BUN 8 2021    CREATININE 0.69 2021        Lab Results   Component Value Date    WBC 15.0 (H) 2021    HEMOGLOBIN 9.4 (L) 2021    HEMATOCRIT 29.4 (L) 2021    PLATELETCT 207 2021        Total time of the discharge process exceeds 30 minutes.

## 2021-11-29 NOTE — PROGRESS NOTES
[facility]  Progress Note  Note Date/Time 2021 10:46:34  N PAC   [id number]   First Name Last Name Admission Type Referral Physician   Abdoul Pearson Normal Nursery MARTINA Wiley      Physical Exam        DOL Today's Weight (g) Change 24 hrs Change 7 days   8 3510 -57 -10   Birth Weight (g) Birth Gest Pos-Mens Age   3520 37 wks 1 d 38 wks 2 d   Date Head Circ (cm) Change 24 hrs Length (cm) Change 24 hrs   2021 33.6 -- 51 --   Temperature Heart Rate Respiratory Rate BP(Sys/Hui) BP Mean O2 Saturation Place of Service   36.8 142 32 71/40 51 98 NICU      Intensive Cardiac and respiratory monitoring, continuous and/or frequent vital sign monitoring     Head/Neck:  Head is normal in size and configuration. Anterior fontanel is flat, open, and soft. Suture lines are open.      Chest:  Chest is normal externally and expands symmetrically. Breath sounds clear. No increased work of breathing.      Heart:  First and second sounds are normal. No murmur is detected. Pulses are strong and equal. Brisk capillary refill.     Abdomen:  Soft, non-tender, and non-distended. No hepatosplenomegaly. Bowel sounds are present.      Genitalia:  Normal external genitalia are present.     Extremities:  No deformities noted. Normal range of motion for all extremities. PICC secured in RUE and infusing without signs of developing complications.      Neurologic:  Tone normalized.     Skin:  Pink and well perfused.       Procedures  Procedure Name Start Date Duration PoS Clinician   Peripherally Inserted Central Line 2021 6 NICU XXX, XXX      Active Culture  Culture Type Date Done Culture Result     Blood 2021 No Growth                Respiratory Support  Respiratory Support Type Start Date Duration   Room Air 2021 8      Health Maintenance  [place] Screening  Screening Date Status   2021 Done   Comments   pending   2021 Ordered            Immunization  Immunization Date  Immunization Type   Status   2021 Hepatitis B  Done      Diagnosis  Diag System Start Date       Nutritional Support FEN/GI 2021             Hypoglycemia-maternal gest diabetes (P70.0) FEN/GI 2021               Infant of Diabetic Mother - gestational (P70.0) FEN/GI 2021               Hypermagnesemia <=28D (P71.8) FEN/GI 2021               Hypocalcemia -  (P71.1) FEN/GI 2021               Central Vascular Access FEN/GI 2021               History   --Gestational diabetes. Baby's initial glucose 7 in nursery. Received glucose gel x3. Most recent glucose 37 prior to transfer. Admit glucoses 66, 58, 50 (started D10), 60, 64, then 28. Received D10 bolus and started and increased D10 to 11 ml/hr (GIR 5.2).  increased to GIR 8.9. PICC inserted. To full enteral feeds , minimal interest in nippling.  down to GIR 2.5 with glucoses in 60s.  --Hypocalcemia: initial iCa 1.06, given 100/kg Ca Gluconate.  iCa 0.95, given 200/kg Ca Gluconate and changed to vTPN for additional Ca.  iCa 1.11.  iCa 1.3, Ca+ discontinued in IVF.  --Baby with decreased tone and reactivity on admission. Mom received Mg. Mg not ran with am labs on Initial Mg 2.7 on DOL3. Slow to downtrend, 2.5 on DOL6.   Assessment   Glucoses 54-77. GIR 5.0-->3.3 this am. Weaning on IVF. Nippled <20% of prescribed feeds. istat by heelstick with K7.2. BD -12. UOP 3ml/k/h. Multiple loose stools. Acidosis may be from RTA per Dr. Polanco. GIR with today's TPN is 3.6.   Plan   62 ml q3h MBM or Gentlease. Nipple per cues. TF with IVFs and feeds is 175ml/k/d.   D15 with Na Acetate and K Acetate for glucose and acidosis stabilization via PICC. Start after bag urine collected. Continue to wean for glucoses >60. Q6h ac glucose. chem panel in am.  send urine for analysis, calcium, creatinine, chloride, potassium, and sodium. Review with Dr. Polanco. Obtain qday urine ph.   Diag System Start Date        Atrial Septal Defect (Q21.1) Cardiovascular 2021             History   Screening echo obtained  due to being infant of diabetic. Showed small atrial shunt, left to right, no obvious PDA, no ventricular hypertrophy. Good function.   Plan   follow up with Peds Cards 4 months after discharge.   Diag System Start Date       Infectious Screen <= 28D (P00.2) Infectious Disease 2021             History   Hypoglycemic and hypothermic in nursery. GBS negative. ROM x11h. No  documented maternal fever/chorio. CBC and blood culture obtained upon admission to NICU. Initial CBC unremarkable. CRP not obtained, but tone improved , so canceled. Blood culture negative final.   Assessment   blood culture negative final.   Plan   Monitor for signs of infection.   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  CUS no IVH.   Diag System Start Date       Large for Gestational Age < 4500g (P08.1) Gestation 2021             Term Infant Gestation 2021               History   This is a 36 wks and 3520 grams late premature infant.   Diag System Start Date       At risk for Hyperbilirubinemia Hyperbilirubinemia 2021             History   MBT A+, Ab negative. Initial Tbili 3.7 on .  Tbili 15.1;phototherapy -. TBili downtrending off phototherapy.   Plan   Monitor clinically.   Diag System Start Date       Metabolic Acidosis of  (P84) Metabolic 2021             History   Initial gas @10 HOL normal. Gases remained normal until CBG on  showed partially compensated metabolic acidosis. 7.///-12. Echo normal. Above BW, excluding dehydration. Blood culture remains negative. Not iatrogenic. Not anemic. 10/28 CBCd reassuring. lactate normal at 0.8. Ammonia 114.   Plan   appreciate Renal input.   Diag System Start Date       Psychosocial Intervention Psychosocial Intervention  11/22/2021             History   First baby. [place] speaking. Consents obtained 11/22 with Dr Han. Admit conference 11/24 with Dr Han.   Plan   Continue to support.   Keepy System Start Date       Assisted Reproductive Technology Assisted Reproductive Technology 11/22/2021                     Authenticated by: GUSTABO SMITH MD   Date/Time: 11/29/2021 11:53

## 2021-12-08 ENCOUNTER — OFFICE VISIT (OUTPATIENT)
Dept: MEDICAL GROUP | Facility: CLINIC | Age: 26
End: 2021-12-08
Payer: COMMERCIAL

## 2021-12-08 VITALS
BODY MASS INDEX: 26.82 KG/M2 | HEART RATE: 112 BPM | OXYGEN SATURATION: 96 % | DIASTOLIC BLOOD PRESSURE: 75 MMHG | SYSTOLIC BLOOD PRESSURE: 119 MMHG | HEIGHT: 65 IN | WEIGHT: 161 LBS

## 2021-12-08 DIAGNOSIS — O13.9 PREGNANCY INDUCED HYPERTENSION, ANTEPARTUM: ICD-10-CM

## 2021-12-08 DIAGNOSIS — O99.810 GESTATIONAL HYPERGLYCEMIA: ICD-10-CM

## 2021-12-08 PROCEDURE — 99213 OFFICE O/P EST LOW 20 MIN: CPT | Mod: GE

## 2021-12-08 ASSESSMENT — FIBROSIS 4 INDEX: FIB4 SCORE: 0.54

## 2021-12-09 PROBLEM — O13.9 PREGNANCY INDUCED HYPERTENSION, ANTEPARTUM: Status: ACTIVE | Noted: 2021-12-09

## 2021-12-09 PROBLEM — O99.810 GESTATIONAL HYPERGLYCEMIA: Status: ACTIVE | Noted: 2021-12-09

## 2021-12-09 NOTE — PROGRESS NOTES
[facility]     PATIENT ID:  NAME:  Liza Pearson  MRN:               [id number]  YOB: 1995    Date: 11:33 AM    Resident: Alejandro Duarte MD    CC:  Evaluation after deliver of [facility]    HPI: Liza Pearson is a 26 y.o. female who presented for evaluation of blood pressure after discharge from hospital status post  section with a history of pregnancy induced hypertension. The patient states that she was diagnosed with high blood pressure during her pregnancy and was started on medication. At discharge she was continued on Nifedipine 30 mg daily. She notes occasional abdominal pain she attributes to the recent  but denies any recent headaches, fever, chills, nausea, vomiting, diarrhea, chest pain, palpitations, lightheadedness or any other complaints. The patient states she has no history of high blood pressure prior to pregnancy and states that this was her first pregnancy. She states she would like the stop the medication if possible. She also notes occasional mid abdominal pain but states that this is mild. She denies any exacerbating or precipitating associations. She reports no further complaints or questions.     Problem   Pregnancy Induced Hypertension, Antepartum       REVIEW OF SYSTEMS:   Ten systems reviewed and were negative except as noted in the HPI.                PROBLEM LIST  Patient Active Problem List   Diagnosis   • Pregnancy resulting from assisted reproductive technology in first trimester   • Two vessel cord   • Anemia in pregnancy   • GDM, class A2   • Macrosomia        PAST SURGICAL HISTORY:  Past Surgical History:   Procedure Laterality Date   • PRIMARY C SECTION N/A 2021    Procedure:  SECTION, PRIMARY;  Surgeon: Harriet Gonzalez D.O.;  Location: SURGERY LABOR AND DELIVERY;  Service: Obstetrics       FAMILY HISTORY:  Patient reports no significant past family medical history.     SOCIAL HISTORY:   Social History  "    Tobacco Use   • Smoking status: Never Smoker   • Smokeless tobacco: Never Used   Substance Use Topics   • Alcohol use: Never       ALLERGIES:  No Known Allergies    OUTPATIENT MEDICATIONS:    Current Outpatient Medications:   •  docusate sodium 100 MG Cap, Take 1 tablet by mouth 2 times a day as needed for Constipation., Disp: 60 Capsule, Rfl: 0  •  ibuprofen (MOTRIN) 800 MG Tab, Take 1 Tablet by mouth every 6 hours as needed., Disp: 60 Tablet, Rfl: 0  •  NIFEdipine (ADALAT CC) 30 MG CR tablet, Take 1 Tablet by mouth every day., Disp: 30 Tablet, Rfl: 0  •  acetaminophen (TYLENOL) 500 MG Tab, Take 2 Tablets by mouth every 6 hours as needed for Mild Pain or Moderate Pain., Disp: 30 Tablet, Rfl: 0  •  norethindrone (MICRONOR) 0.35 MG tablet, Take 1 Tablet by mouth every day., Disp: 28 Tablet, Rfl: 0  •  Alcohol Swabs, Wipe site with prep pad prior to injection., Disp: 100 Each, Rfl: 2  •  Insulin Syringe-Needle U-100 (BD INSULIN SYRINGE U/F) 31G X 5/16\" 0.3 ML Misc, use with 5 units of NPH at bedtime (pt needs insulin education), Disp: 100 Each, Rfl: 1    PHYSICAL EXAM:  Vitals:    21 1531   BP: 119/75   Pulse: (!) 112   SpO2: 96%   Weight: 73 kg (161 lb)   Height: 1.651 m (5' 5\")       General: Pt resting in NAD, pleasant and cooperative, Appropriate affect/mood/thought content  Skin:  Pink, warm and dry.  HEENT: NC/AT. EOMI.  Lungs:  Symmetrical.  CTAB, good air movement   Cardiovascular:  S1/S2 RRR, no m/r/g, pulse of 83 at the time of exam   Abdomen:  Abdomen is soft, minimal RUQ tenderness to palpation but otherwise nontender, Prevena in place over  site without any surrounding erythema, swelling, drainage or discharge.   Extremities:  Full range of motion.  CNS:  Muscle tone is normal. No gross focal neurologic deficits      ASSESSMENT/PLAN:   26 y.o. female status post  section delivery of 1st pregnancy. The patient has a history of PIH and was discharged from hospital on Nifedipine. " Upon arrival to clinic the patient's blood pressure is 119/75. I counseled the patient on blood pressure and as she would like to stop the medication we will have her discontinue Nifedipine and periodically measure her blood pressure over the next 2 weeks and return to clinic with a BP log to evaluate how her blood pressures have responded postpartum. Patient is following up with OB as well. Conversation was was performed with english to Sami translation performed by Dr. Carlson.     Problem List Items Addressed This Visit     Pregnancy induced hypertension, antepartum    Gestational hyperglycemia          Alejandro Duarte MD  PGY-1  [facility]

## 2022-01-03 ENCOUNTER — POST PARTUM (OUTPATIENT)
Dept: OBGYN | Facility: CLINIC | Age: 27
End: 2022-01-03
Payer: COMMERCIAL

## 2022-01-03 VITALS — DIASTOLIC BLOOD PRESSURE: 80 MMHG | WEIGHT: 156 LBS | BODY MASS INDEX: 25.96 KG/M2 | SYSTOLIC BLOOD PRESSURE: 132 MMHG

## 2022-01-03 DIAGNOSIS — Z98.891 HISTORY OF CESAREAN DELIVERY: ICD-10-CM

## 2022-01-03 DIAGNOSIS — Z86.32 HISTORY OF GESTATIONAL DIABETES MELLITUS (GDM): ICD-10-CM

## 2022-01-03 DIAGNOSIS — Z87.59 HISTORY OF PRE-ECLAMPSIA: ICD-10-CM

## 2022-01-03 PROCEDURE — RXMED WILLOW AMBULATORY MEDICATION CHARGE: Performed by: NURSE PRACTITIONER

## 2022-01-03 PROCEDURE — 0503F POSTPARTUM CARE VISIT: CPT | Performed by: NURSE PRACTITIONER

## 2022-01-03 RX ORDER — ACETAMINOPHEN AND CODEINE PHOSPHATE 120; 12 MG/5ML; MG/5ML
1 SOLUTION ORAL DAILY
Qty: 28 TABLET | Refills: 12 | Status: SHIPPED | OUTPATIENT
Start: 2022-01-03 | End: 2022-02-13

## 2022-01-03 ASSESSMENT — EDINBURGH POSTNATAL DEPRESSION SCALE (EPDS)
I HAVE BEEN SO UNHAPPY THAT I HAVE HAD DIFFICULTY SLEEPING: NOT AT ALL
I HAVE BEEN SO UNHAPPY THAT I HAVE BEEN CRYING: NO, NEVER
I HAVE BLAMED MYSELF UNNECESSARILY WHEN THINGS WENT WRONG: NO, NEVER
I HAVE BEEN ABLE TO LAUGH AND SEE THE FUNNY SIDE OF THINGS: AS MUCH AS I ALWAYS COULD
I HAVE BLAMED MYSELF UNNECESSARILY WHEN THINGS WENT WRONG: NO, NEVER
TOTAL SCORE: 1
I HAVE BEEN ABLE TO LAUGH AND SEE THE FUNNY SIDE OF THINGS: AS MUCH AS I ALWAYS COULD
I HAVE LOOKED FORWARD WITH ENJOYMENT TO THINGS: AS MUCH AS I EVER DID
I HAVE BEEN SO UNHAPPY THAT I HAVE HAD DIFFICULTY SLEEPING: NOT AT ALL
I HAVE FELT SCARED OR PANICKY FOR NO GOOD REASON: NO, NOT AT ALL
I HAVE BEEN ANXIOUS OR WORRIED FOR NO GOOD REASON: NO, NOT AT ALL
I HAVE FELT SAD OR MISERABLE: NO, NOT AT ALL
TOTAL SCORE: 1
THINGS HAVE BEEN GETTING ON TOP OF ME: NO, MOST OF THE TIME I HAVE COPED QUITE WELL
I HAVE BEEN ANXIOUS OR WORRIED FOR NO GOOD REASON: NO, NOT AT ALL
THE THOUGHT OF HARMING MYSELF HAS OCCURRED TO ME: NEVER
I HAVE BEEN SO UNHAPPY THAT I HAVE BEEN CRYING: NO, NEVER
I HAVE FELT SAD OR MISERABLE: NO, NOT AT ALL
THINGS HAVE BEEN GETTING ON TOP OF ME: NO, MOST OF THE TIME I HAVE COPED QUITE WELL
THE THOUGHT OF HARMING MYSELF HAS OCCURRED TO ME: NEVER
I HAVE LOOKED FORWARD WITH ENJOYMENT TO THINGS: AS MUCH AS I EVER DID
I HAVE FELT SCARED OR PANICKY FOR NO GOOD REASON: NO, NOT AT ALL

## 2022-01-03 ASSESSMENT — ENCOUNTER SYMPTOMS
CONSTITUTIONAL NEGATIVE: 1
EYES NEGATIVE: 1
RESPIRATORY NEGATIVE: 1
NEUROLOGICAL NEGATIVE: 1
GASTROINTESTINAL NEGATIVE: 1
PSYCHIATRIC NEGATIVE: 1
CARDIOVASCULAR NEGATIVE: 1
MUSCULOSKELETAL NEGATIVE: 1

## 2022-01-03 ASSESSMENT — FIBROSIS 4 INDEX: FIB4 SCORE: 0.54

## 2022-01-03 NOTE — PROGRESS NOTES
Subjective     Liza Pearson is a 26 y.o. female who presents with No chief complaint on file.            S   27 y/o now  s/p c/s 21 of baby without complications. No laceration. Now 6 weeks postpartum. Prenatal course significant for GDM, Pre E. Postpartum course without any complications. Feeling well and happy with baby, denies any severe mood swings or s/sx of postpartum depression and anxiety.     Baby is doing well, formula exclusively.  Has not resumed sexual activity.  Mother reports no issues with bowel or bladder routine, continued regular diet. Bleeding since birth has subsided at this time with no return to menses. Denies incisional pain, drainage or redness. No vaginal pain/odor/itching, fever, headaches, dizziness/SOB or dysuria. Desires micronor for contraception.     O  See PE: Physical exam today with no abnormal findings  Vital signs WNL: BP and weight   PAP: NILM on 2021    A  Reassuring exam of lactating postpartum woman s/p primary c/s on 21     P  - Rx micronor for contraception as pt declines COCs  - 2 hr glucose test ordered  - Has follow up soon with PCP for BP check   - Resumption of sexual activity: safe sex precautions given  - Counseling on nutrition, adequate hydration, and exercise   - Kegel Exercises for pelvic floor/prevention of urinary incontinence   - EPDS: 1  - Continue PNV for breastfeeding mother  - Counseling on PAP guidelines re: next PAP due   - Warning s/sx of postpartum infection, depression, preeclampsia   - RTC PRN             Review of Systems   Constitutional: Negative.    HENT: Negative.    Eyes: Negative.    Respiratory: Negative.    Cardiovascular: Negative.    Gastrointestinal: Negative.    Genitourinary: Negative.    Musculoskeletal: Negative.    Skin: Negative.    Neurological: Negative.    Endo/Heme/Allergies: Negative.    Psychiatric/Behavioral: Negative.               Objective     /80   Wt 70.8 kg (156 lb)   LMP  03/10/2021 (Approximate)   BMI 25.96 kg/m²      Physical Exam  Constitutional:       Appearance: Normal appearance.   HENT:      Nose: Nose normal.      Mouth/Throat:      Mouth: Mucous membranes are moist.   Cardiovascular:      Rate and Rhythm: Normal rate and regular rhythm.      Pulses: Normal pulses.      Heart sounds: Normal heart sounds.   Pulmonary:      Effort: Pulmonary effort is normal.      Breath sounds: Normal breath sounds.   Abdominal:      General: Abdomen is flat.      Palpations: Abdomen is soft.      Comments: incision well healed and approximated w/o s/sx of infection    Musculoskeletal:         General: Normal range of motion.      Cervical back: Normal range of motion and neck supple.   Skin:     General: Skin is warm and dry.   Neurological:      Mental Status: She is alert and oriented to person, place, and time.   Psychiatric:         Mood and Affect: Mood normal.         Behavior: Behavior normal.         Thought Content: Thought content normal.         Judgment: Judgment normal.                             Assessment & Plan        1. History of pre-eclampsia      2. History of  delivery      3. History of GDMA2    - GLUCOSE TOLERANCE 2 HOUR; Future

## 2022-01-03 NOTE — PROGRESS NOTES
Pt here today for postpartum exam.  Delivery type: 11/21/21 C/S  Currently : Bottle feeding  Desired BCM: BC pills   LMP: No menses at this time   Last pap: 6/4/21 Negative  [phone] (home)

## 2022-01-05 ENCOUNTER — OFFICE VISIT (OUTPATIENT)
Dept: MEDICAL GROUP | Facility: CLINIC | Age: 27
End: 2022-01-05
Payer: COMMERCIAL

## 2022-01-05 VITALS
OXYGEN SATURATION: 98 % | BODY MASS INDEX: 25.49 KG/M2 | HEIGHT: 65 IN | SYSTOLIC BLOOD PRESSURE: 152 MMHG | DIASTOLIC BLOOD PRESSURE: 82 MMHG | WEIGHT: 153 LBS | HEART RATE: 62 BPM

## 2022-01-05 DIAGNOSIS — Z01.30 BLOOD PRESSURE CHECK: ICD-10-CM

## 2022-01-05 PROCEDURE — 99213 OFFICE O/P EST LOW 20 MIN: CPT | Mod: GE

## 2022-01-05 ASSESSMENT — FIBROSIS 4 INDEX: FIB4 SCORE: 0.54

## 2022-01-06 NOTE — PROGRESS NOTES
[facility]     PATIENT ID:  NAME:  Liza Pearson  MRN:               [id number]  YOB: 1995    Date: 4:25 PM      Resident: Alejandro Duarte MD     CC: Recheck for blood pressure    HPI: Liza Pearson is a 26 y.o. female who presented for blood pressure recheck.  The patient reports that she had a recent  with history of pregnancy-induced hypertension.  She was seen in the clinic approximately 1 month ago at which time we stopped nifedipine with a plan of blood pressure monitoring to decide if the patient required further medications.  The patient reports that she has been taking her blood pressure twice weekly and has had normal blood pressures ranging from 110-125 over 70s.  The patient states that she did not write down her blood pressure log as we talked about but states that her blood pressure was always normal.  She denies any recent headache, changes in sensation, changes in her vision, nausea, vomiting or any other complaints at this time.  She reports that she does not want to start medication at this time and feels that she becomes very anxious when she comes to the clinic because every time she takes her blood pressure at the grocery store her blood pressures are normal.    REVIEW OF SYSTEMS:   Ten systems reviewed and were negative except as noted in the HPI.                PROBLEM LIST  Patient Active Problem List   Diagnosis   • Pregnancy resulting from assisted reproductive technology in first trimester   • History of pre-eclampsia   • History of  delivery   • History of GDMA2        PAST SURGICAL HISTORY:  Past Surgical History:   Procedure Laterality Date   • PRIMARY C SECTION N/A 2021    Procedure:  SECTION, PRIMARY;  Surgeon: Harriet Gonzalez D.O.;  Location: SURGERY LABOR AND DELIVERY;  Service: Obstetrics       FAMILY HISTORY:  No family history on file.    SOCIAL HISTORY:   Social History     Tobacco Use   • Smoking  "status: Never Smoker   • Smokeless tobacco: Never Used   Substance Use Topics   • Alcohol use: Never       ALLERGIES:  No Known Allergies    OUTPATIENT MEDICATIONS:    Current Outpatient Medications:   •  norethindrone (MICRONOR) 0.35 MG tablet, Take 1 Tablet by mouth every day. (Patient not taking: Reported on 2022), Disp: 28 Tablet, Rfl: 12  •  docusate sodium 100 MG Cap, Take 1 tablet by mouth 2 times a day as needed for Constipation. (Patient not taking: Reported on 2022), Disp: 60 Capsule, Rfl: 0  •  ibuprofen (MOTRIN) 800 MG Tab, Take 1 Tablet by mouth every 6 hours as needed. (Patient not taking: Reported on 2022), Disp: 60 Tablet, Rfl: 0  •  acetaminophen (TYLENOL) 500 MG Tab, Take 2 Tablets by mouth every 6 hours as needed for Mild Pain or Moderate Pain. (Patient not taking: Reported on 2022), Disp: 30 Tablet, Rfl: 0    PHYSICAL EXAM:  Vitals:    22 1603   BP: 152/82   Pulse: 62   SpO2: 98%   Weight: 69.4 kg (153 lb)   Height: 1.651 m (5' 5\")       General: Pt resting in NAD, slightly anxious appearing, pleasant and cooperative   Skin:  Pink, warm and dry.  HEENT: NC/AT. EOMI. PERRLA.  Lungs:  Symmetrical.  CTAB, good air movement   Cardiovascular:  S1/S2 RRR   Abdomen:  Abdomen is soft, nontender  Extremities:  Full range of motion.  CNS:  Muscle tone is normal. No gross focal neurologic deficits, cranial nerves II through XII grossly intact.      ASSESSMENT/PLAN:   26 y.o. female who presents to the clinic for blood pressure recheck.  This patient has a history of pregnancy-induced hypertension, preeclampsia and gestational diabetes.  The patient presented to clinic after her  for evaluation of her blood pressure.  The patient had no history of hypertension prior to pregnancy and states she felt the blood pressure medication brought her blood pressure too low.  Over the past month she has been doing twice weekly blood pressure checks and states that her blood pressure has " been normal every time other than once but it was 130/80. Upon arrival to her visit today her blood pressure was found to be 150/80. Have advised patient that as her blood pressure today was high we should continue monitoring with 2-3 blood pressure checks per week with a written blood pressure log. I have the patient return in 2 months for evaluation of how her blood pressures have been running during this time. Patient and  are agreeable to this course.       Visit Diagnoses     Blood pressure check              Alejandro Duarte MD  PGY-1  [facility]

## 2022-01-06 NOTE — PATIENT INSTRUCTIONS
Hoja de registro de la presión arterial  Blood Pressure Record Sheet  Para rafael mares presión arterial, necesitará un aparato de medición de la presión arterial. Puede comprar un aparato de medición de la presión arterial (tensiómetro) en la clínica, farmacia o en línea. Al elegir priscilla, considere lo siguiente:  · Un tensiómetro automático que tenga un brazalete.  · Un brazalete que envuelva ceñidamente la parte superior de mares brazo. Debe poder meter únicamente un dedo entre el brazalete y el brazo.  · Un dispositivo que almacene los resultados de la lectura de la presión arterial.  · No escoja un tensiómetro que mida mares presión arterial en la august o el dedo.  Siga las instrucciones de mares médico con respecto a cómo medir la presión arterial. Para usar juan miguel formulario:  · Realice jessica lectura a la mañana (a. m.) antes de rafael cualquier medicamento.  · Realice jessica lectura a la noche (p. m.) antes de la jatin.  · Realice al menos 2 lecturas con cada verificación de la presión arterial. [facility] permite asegurarse de que los resultados bill correctos. Espere de 1 a 2 minutos entre jessica medición y la otra.  · Anote los resultados en los espacios proporcionados en juan miguel formulario.  · Repita esto jessica vez por semana, o yaniv se lo haya indicado mares médico.  · Concurra a jessica aranza de seguimiento con mares médico para analizar los resultados.  Registro de la presión arterial  Fecha: _______________________  · a. m. _____________________(1ª lectura) _____________________(2ª lectura)  · p. m. _____________________(1ª lectura) _____________________(2ª lectura)  Fecha: _______________________  · a. m. _____________________(1ª lectura) _____________________(2ª lectura)  · p. m. _____________________(1ª lectura) _____________________(2ª lectura)  Fecha: _______________________  · a. m. _____________________(1ª lectura) _____________________(2ª lectura)  · p. m. _____________________(1ª lectura) _____________________(2ª lectura)  Fecha:  _______________________  · a. m. _____________________(1ª lectura) _____________________(2ª lectura)  · p. m. _____________________(1ª lectura) _____________________(2ª lectura)  Fecha: _______________________  · a. m. _____________________(1ª lectura) _____________________(2ª lectura)  · p. m. _____________________(1ª lectura) _____________________(2ª lectura)  Esta información no tiene yaniv fin reemplazar el consejo del médico. Asegúrese de hacerle al médico cualquier pregunta que hugo.  Document Released: 10/14/2008 Document Revised: 01/30/2019 Document Reviewed: 01/30/2019  Elsevier Patient Education © 2020 [employer]

## 2022-01-07 ENCOUNTER — PHARMACY VISIT (OUTPATIENT)
Dept: PHARMACY | Facility: MEDICAL CENTER | Age: 27
End: 2022-01-07
Payer: MEDICARE

## 2022-01-07 PROCEDURE — RXMED WILLOW AMBULATORY MEDICATION CHARGE: Performed by: INTERNAL MEDICINE

## 2022-01-07 RX ORDER — RNA INGREDIENT BNT-162B2 0.23 G/1.8ML
INJECTION, SUSPENSION INTRAMUSCULAR
Qty: 0.3 ML | Refills: 0 | Status: SHIPPED | OUTPATIENT
Start: 2022-01-07 | End: 2022-01-28 | Stop reason: SDUPTHER

## 2022-01-28 ENCOUNTER — APPOINTMENT (OUTPATIENT)
Dept: PHARMACY | Facility: MEDICAL CENTER | Age: 27
End: 2022-01-28

## 2022-01-28 ENCOUNTER — PHARMACY VISIT (OUTPATIENT)
Dept: PHARMACY | Facility: MEDICAL CENTER | Age: 27
End: 2022-01-28
Payer: MEDICARE

## 2022-01-28 PROCEDURE — RXMED WILLOW AMBULATORY MEDICATION CHARGE: Performed by: INTERNAL MEDICINE

## 2022-01-28 RX ORDER — RNA INGREDIENT BNT-162B2 0.23 G/1.8ML
0.3 INJECTION, SUSPENSION INTRAMUSCULAR
Qty: 0.3 ML | Refills: 0 | Status: SHIPPED | OUTPATIENT
Start: 2022-01-28 | End: 2023-09-12

## 2022-02-15 ENCOUNTER — HOSPITAL ENCOUNTER (OUTPATIENT)
Facility: MEDICAL CENTER | Age: 27
End: 2022-02-15
Attending: NURSE PRACTITIONER
Payer: COMMERCIAL

## 2022-02-15 ENCOUNTER — HOSPITAL ENCOUNTER (OUTPATIENT)
Dept: LAB | Facility: MEDICAL CENTER | Age: 27
End: 2022-02-15
Attending: NURSE PRACTITIONER
Payer: COMMERCIAL

## 2022-02-15 ENCOUNTER — GYNECOLOGY VISIT (OUTPATIENT)
Dept: OBGYN | Facility: CLINIC | Age: 27
End: 2022-02-15
Payer: COMMERCIAL

## 2022-02-15 VITALS — SYSTOLIC BLOOD PRESSURE: 126 MMHG | WEIGHT: 154 LBS | DIASTOLIC BLOOD PRESSURE: 76 MMHG | BODY MASS INDEX: 25.63 KG/M2

## 2022-02-15 DIAGNOSIS — N89.8 VAGINAL ODOR: ICD-10-CM

## 2022-02-15 DIAGNOSIS — Z86.32 HISTORY OF GESTATIONAL DIABETES MELLITUS (GDM): ICD-10-CM

## 2022-02-15 LAB
CANDIDA DNA VAG QL PROBE+SIG AMP: NEGATIVE
G VAGINALIS DNA VAG QL PROBE+SIG AMP: NEGATIVE
GLUCOSE 1H P CHAL SERPL-MCNC: 178 MG/DL (ref 65–199)
GLUCOSE 2H P CHAL SERPL-MCNC: 147 MG/DL (ref 65–139)
GLUCOSE BS SERPL-MCNC: 92 MG/DL (ref 65–99)
T VAGINALIS DNA VAG QL PROBE+SIG AMP: NEGATIVE

## 2022-02-15 PROCEDURE — 36415 COLL VENOUS BLD VENIPUNCTURE: CPT

## 2022-02-15 PROCEDURE — 82951 GLUCOSE TOLERANCE TEST (GTT): CPT

## 2022-02-15 PROCEDURE — 99212 OFFICE O/P EST SF 10 MIN: CPT | Performed by: NURSE PRACTITIONER

## 2022-02-15 PROCEDURE — 87480 CANDIDA DNA DIR PROBE: CPT

## 2022-02-15 PROCEDURE — 87510 GARDNER VAG DNA DIR PROBE: CPT

## 2022-02-15 PROCEDURE — 87660 TRICHOMONAS VAGIN DIR PROBE: CPT

## 2022-02-15 ASSESSMENT — FIBROSIS 4 INDEX: FIB4 SCORE: 0.54

## 2022-02-15 NOTE — PROGRESS NOTES
HPI Comments:  Liza Pearson is a 26 y.o. y.o. female who presents for problem gyn visit: bad vaginal odor for one week.Reports initially d/c was yellow and then she had her period and the d/c stopped but the smell is still there. Denies any itch or burning. Pt has no other complaints. She has never had this issues before.     Review of Systems :  Constitutional: Denies any issues  EENT: Denies any issues  Cardio: Denies any issues  Resp: Denies any issues  GI: Denies any issues  : Denies any issues  Pertinent positives documented in HPI and all other systems reviewed & are negative    All PMH, PSH, allergies, social history and FH reviewed and updated today:  No past medical history on file.  Past Surgical History:   Procedure Laterality Date   • PRIMARY C SECTION N/A 2021    Procedure:  SECTION, PRIMARY;  Surgeon: Harriet Gonzalez D.O.;  Location: [facility];  Service: Obstetrics     Patient has no known allergies.  Social History     Socioeconomic History   • Marital status: Single     Spouse name: Not on file   • Number of children: Not on file   • Years of education: Not on file   • Highest education level: Not on file   Occupational History   • Not on file   Tobacco Use   • Smoking status: Never Smoker   • Smokeless tobacco: Never Used   Vaping Use   • Vaping Use: Never used   Substance and Sexual Activity   • Alcohol use: Never   • Drug use: Never   • Sexual activity: Not Currently     Partners: Male   Other Topics Concern   • Not on file   Social History Narrative   • Not on file     Social Determinants of Health     Financial Resource Strain:    • Difficulty of Paying Living Expenses: Not on file   Food Insecurity:    • Worried About Running Out of Food in the Last Year: Not on file   • Ran Out of Food in the Last Year: Not on file   Transportation Needs:    • Lack of Transportation (Medical): Not on file   • Lack of Transportation (Non-Medical): Not on file    Physical Activity:    • Days of Exercise per Week: Not on file   • Minutes of Exercise per Session: Not on file   Stress:    • Feeling of Stress : Not on file   Social Connections:    • Frequency of Communication with Friends and Family: Not on file   • Frequency of Social Gatherings with Friends and Family: Not on file   • Attends [employer] Services: Not on file   • Active Member of Clubs or Organizations: Not on file   • Attends Club or Organization Meetings: Not on file   • Marital Status: Not on file   Intimate Partner Violence:    • Fear of Current or Ex-Partner: Not on file   • Emotionally Abused: Not on file   • Physically Abused: Not on file   • Sexually Abused: Not on file   Housing Stability:    • Unable to Pay for Housing in the Last Year: Not on file   • Number of Places Lived in the Last Year: Not on file   • Unstable Housing in the Last Year: Not on file     No family history on file.  Medications:   Current Outpatient Medications Ordered in Epic   Medication Sig Dispense Refill   • COVID-19 mRNA Vaccine, Pfizer, (PFIZER-BIONTECH COVID-19 VACC) 30 MCG/0.3ML Suspension injection Inject 0.3 mL into the shoulder, thigh, or buttocks. 0.3 mL 0     No current Epic-ordered facility-administered medications on file.          Objective:   Vital measurements:  /76   Wt 69.9 kg (154 lb)   Body mass index is 25.63 kg/m². (Goal BM I>18 <25)    Physical Exam   Nursing note and vitals reviewed.  Constitutional: She is oriented to person, place, and time. She appears well-developed and well-nourished. No distress.     Abdominal: Soft. Bowel sounds are normal. She exhibits no distension and no mass. No tenderness. She has no rebound and no guarding.     Breast:  deferred    Genitourinary:  Vaginal pathogens swab collected     Neurological: She is alert and oriented to person, place, and time. She exhibits normal muscle tone.     Skin: Skin is warm and dry. No rash noted. She is not diaphoretic. No erythema. No  pallor.     Psychiatric: She has a normal mood and affect. Her behavior is normal. Judgment and thought content normal.        Assessment:     Vaginal odor     Plan:   Vaginal pathogens collected, pt declines STI testing   Reviewed vulvar hygiene with pt  Will call with results if positive     No follow-ups on file.

## 2022-02-16 ENCOUNTER — TELEPHONE (OUTPATIENT)
Dept: OBGYN | Facility: CLINIC | Age: 27
End: 2022-02-16

## 2022-02-16 NOTE — TELEPHONE ENCOUNTER
----- Message from [place] Jeronimo sent at 2/16/2022  1:44 PM PST -----  Pt needs follow up with PCP for diabetes check up and further testing as indicated because she at increased risk for diabetes outside of pregnancy. Her vaginal testing was normal.       Called pt an notified as above. Pt states she does not have PCP at this time. Advised pt to contact her insurance to find out providers available in the area. Pt agreed and verbalized understanding.

## 2023-03-11 ENCOUNTER — HOSPITAL ENCOUNTER (EMERGENCY)
Facility: MEDICAL CENTER | Age: 28
End: 2023-03-11
Attending: EMERGENCY MEDICINE
Payer: MEDICAID

## 2023-03-11 ENCOUNTER — APPOINTMENT (OUTPATIENT)
Dept: RADIOLOGY | Facility: MEDICAL CENTER | Age: 28
End: 2023-03-11
Attending: EMERGENCY MEDICINE
Payer: MEDICAID

## 2023-03-11 VITALS
DIASTOLIC BLOOD PRESSURE: 62 MMHG | SYSTOLIC BLOOD PRESSURE: 121 MMHG | HEART RATE: 66 BPM | HEIGHT: 65 IN | OXYGEN SATURATION: 97 % | BODY MASS INDEX: 26.74 KG/M2 | WEIGHT: 160.5 LBS | TEMPERATURE: 98.1 F | RESPIRATION RATE: 16 BRPM

## 2023-03-11 DIAGNOSIS — O20.9 VAGINAL BLEEDING IN PREGNANCY, FIRST TRIMESTER: ICD-10-CM

## 2023-03-11 LAB
ALBUMIN SERPL BCP-MCNC: 4.8 G/DL (ref 3.2–4.9)
ALBUMIN/GLOB SERPL: 1.5 G/DL
ALP SERPL-CCNC: 89 U/L (ref 30–99)
ALT SERPL-CCNC: 34 U/L (ref 2–50)
ANION GAP SERPL CALC-SCNC: 10 MMOL/L (ref 7–16)
APPEARANCE UR: CLEAR
AST SERPL-CCNC: 13 U/L (ref 12–45)
B-HCG SERPL-ACNC: 39.9 MIU/ML (ref 0–5)
BACTERIA #/AREA URNS HPF: NEGATIVE /HPF
BASOPHILS # BLD AUTO: 0.6 % (ref 0–1.8)
BASOPHILS # BLD: 0.04 K/UL (ref 0–0.12)
BILIRUB SERPL-MCNC: 0.3 MG/DL (ref 0.1–1.5)
BILIRUB UR QL STRIP.AUTO: NEGATIVE
BUN SERPL-MCNC: 8 MG/DL (ref 8–22)
CALCIUM ALBUM COR SERPL-MCNC: 8.6 MG/DL (ref 8.5–10.5)
CALCIUM SERPL-MCNC: 9.2 MG/DL (ref 8.5–10.5)
CHLORIDE SERPL-SCNC: 104 MMOL/L (ref 96–112)
CO2 SERPL-SCNC: 22 MMOL/L (ref 20–33)
COLOR UR: YELLOW
CREAT SERPL-MCNC: 0.46 MG/DL (ref 0.5–1.4)
EOSINOPHIL # BLD AUTO: 0.03 K/UL (ref 0–0.51)
EOSINOPHIL NFR BLD: 0.4 % (ref 0–6.9)
EPI CELLS #/AREA URNS HPF: NEGATIVE /HPF
ERYTHROCYTE [DISTWIDTH] IN BLOOD BY AUTOMATED COUNT: 42.3 FL (ref 35.9–50)
GFR SERPLBLD CREATININE-BSD FMLA CKD-EPI: 134 ML/MIN/1.73 M 2
GLOBULIN SER CALC-MCNC: 3.1 G/DL (ref 1.9–3.5)
GLUCOSE SERPL-MCNC: 97 MG/DL (ref 65–99)
GLUCOSE UR STRIP.AUTO-MCNC: NEGATIVE MG/DL
HCT VFR BLD AUTO: 43.5 % (ref 37–47)
HGB BLD-MCNC: 14.8 G/DL (ref 12–16)
HYALINE CASTS #/AREA URNS LPF: ABNORMAL /LPF
IMM GRANULOCYTES # BLD AUTO: 0.02 K/UL (ref 0–0.11)
IMM GRANULOCYTES NFR BLD AUTO: 0.3 % (ref 0–0.9)
KETONES UR STRIP.AUTO-MCNC: NEGATIVE MG/DL
LEUKOCYTE ESTERASE UR QL STRIP.AUTO: NEGATIVE
LYMPHOCYTES # BLD AUTO: 1.5 K/UL (ref 1–4.8)
LYMPHOCYTES NFR BLD: 22 % (ref 22–41)
MCH RBC QN AUTO: 32.7 PG (ref 27–33)
MCHC RBC AUTO-ENTMCNC: 34 G/DL (ref 33.6–35)
MCV RBC AUTO: 96 FL (ref 81.4–97.8)
MICRO URNS: ABNORMAL
MONOCYTES # BLD AUTO: 0.41 K/UL (ref 0–0.85)
MONOCYTES NFR BLD AUTO: 6 % (ref 0–13.4)
NEUTROPHILS # BLD AUTO: 4.83 K/UL (ref 2–7.15)
NEUTROPHILS NFR BLD: 70.7 % (ref 44–72)
NITRITE UR QL STRIP.AUTO: NEGATIVE
NRBC # BLD AUTO: 0 K/UL
NRBC BLD-RTO: 0 /100 WBC
NUMBER OF RH DOSES IND 8505RD: NORMAL
PH UR STRIP.AUTO: 5.5 [PH] (ref 5–8)
PLATELET # BLD AUTO: 282 K/UL (ref 164–446)
PMV BLD AUTO: 9.6 FL (ref 9–12.9)
POTASSIUM SERPL-SCNC: 3.8 MMOL/L (ref 3.6–5.5)
PROT SERPL-MCNC: 7.9 G/DL (ref 6–8.2)
PROT UR QL STRIP: NEGATIVE MG/DL
RBC # BLD AUTO: 4.53 M/UL (ref 4.2–5.4)
RBC # URNS HPF: >150 /HPF
RBC UR QL AUTO: ABNORMAL
RH BLD: NORMAL
SODIUM SERPL-SCNC: 136 MMOL/L (ref 135–145)
SP GR UR STRIP.AUTO: 1.01
UROBILINOGEN UR STRIP.AUTO-MCNC: 0.2 MG/DL
WBC # BLD AUTO: 6.8 K/UL (ref 4.8–10.8)
WBC #/AREA URNS HPF: ABNORMAL /HPF

## 2023-03-11 PROCEDURE — 85025 COMPLETE CBC W/AUTO DIFF WBC: CPT

## 2023-03-11 PROCEDURE — 99284 EMERGENCY DEPT VISIT MOD MDM: CPT

## 2023-03-11 PROCEDURE — 80053 COMPREHEN METABOLIC PANEL: CPT

## 2023-03-11 PROCEDURE — 86901 BLOOD TYPING SEROLOGIC RH(D): CPT

## 2023-03-11 PROCEDURE — 81001 URINALYSIS AUTO W/SCOPE: CPT

## 2023-03-11 PROCEDURE — 36415 COLL VENOUS BLD VENIPUNCTURE: CPT

## 2023-03-11 PROCEDURE — 84702 CHORIONIC GONADOTROPIN TEST: CPT

## 2023-03-11 PROCEDURE — 76801 OB US < 14 WKS SINGLE FETUS: CPT

## 2023-03-11 ASSESSMENT — FIBROSIS 4 INDEX: FIB4 SCORE: 0.59

## 2023-03-11 NOTE — ED TRIAGE NOTES
"Chief Complaint   Patient presents with    Vaginal Bleeding     Pt has a positive pregnancy test a couple days ago. Pt's last period was January 9th.  Pt noticed some bleeding yesterday with cramping in the abdomin.  Pt states she soaked through a small pad today so far.      /81   Pulse 65   Temp 36.6 °C (97.8 °F) (Temporal)   Resp 18   Ht 1.651 m (5' 5\")   Wt 72.8 kg (160 lb 7.9 oz)   SpO2 98%   BMI 26.71 kg/m²     Pt ambulatory from triage with steady gait.   used for triage. This is pt's second pregnancy. Vaginal bleeding protocol ordered.     Pt is alert and oriented, speaking in full sentences, follows commands and responds appropriately to questions. Resp are even and unlabored.      Pt placed in lobby. Pt educated on triage process. Pt encouraged to alert staff for any changes.     Patient and staff wearing appropriate PPE    "

## 2023-03-11 NOTE — ED PROVIDER NOTES
ER Provider Note    Scribed for Liza Jean M.d. by Phillip Khan. 3/11/2023  1:46 PM    Primary Care Provider: Alejandro Duarte M.D.    CHIEF COMPLAINT  Chief Complaint   Patient presents with    Vaginal Bleeding     Pt has a positive pregnancy test a couple days ago. Pt's last period was .  Pt noticed some bleeding yesterday with cramping in the abdomin.  Pt states she soaked through a small pad today so far.      HPI/ROS  LIMITATION TO HISTORY   None and Language Kiswahili,  Used [id number]  OUTSIDE HISTORIAN(S):  None    Liza Pearson is a 28 y.o. female who presents to the ED complaining of vaginal bleeding onset 2 days. She reports getting a positive at-home pregnancy test a few days ago and with associated lower abdominal cramping noticed some bleeding (enough to soak through a pad). Liza reports her last menstrual period was 23. She reports having two prior pregnancy with 1 miscarriage and 1 child. She denies any fever or dizziness. She denies any OB appointment as she reports that her PCP will refer her to OB when needed.     PAST MEDICAL HISTORY  History reviewed. No pertinent past medical history.    SURGICAL HISTORY  Past Surgical History:   Procedure Laterality Date    PRIMARY C SECTION N/A 2021    Procedure:  SECTION, PRIMARY;  Surgeon: Harriet Gonzalez D.O.;  Location: [facility];  Service: Obstetrics     FAMILY HISTORY  History reviewed. No pertinent family history.    SOCIAL HISTORY   reports that she has never smoked. She has never used smokeless tobacco. She reports that she does not drink alcohol and does not use drugs.    CURRENT MEDICATIONS  Previous Medications    COVID-19 MRNA VACCINE, PFIZER, (PFIZER-Dotted Block COVID-19 VACC) 30 MCG/0.3ML SUSPENSION INJECTION    Inject 0.3 mL into the shoulder, thigh, or buttocks.     ALLERGIES  Patient has no known allergies.    PHYSICAL EXAM  /81   Pulse 65   Temp 36.6 °C (97.8 °F)  "(Temporal)   Resp 18   Ht 1.651 m (5' 5\")   Wt 72.8 kg (160 lb 7.9 oz)   SpO2 98%      Constitutional: Alert in no apparent distress.  HENT: Normocephalic, Bilateral external ears normal. Nose normal.   Eyes: Pupils are equal and reactive. Conjunctiva normal, non-icteric.   Thorax & Lungs: Easy unlabored respirations. Lungs clear.  Cardiovascular: Regular rate and rhythm.   Abdomen:  No gross signs of peritonitis, no pain with movement. No distention. Mild diffuse lower abdominal tenderness.   Skin: Visualized skin is  Dry, No erythema, No rash.   Extremities:   No edema, No asymmetry  Neurologic: Alert, Grossly non-focal.   Psychiatric: Affect and Mood normal.    DIAGNOSTIC STUDIES    Labs:   All labs reviewed by me.  Results for orders placed or performed during the hospital encounter of 03/11/23   CBC WITH DIFFERENTIAL   Result Value Ref Range    WBC 6.8 4.8 - 10.8 K/uL    RBC 4.53 4.20 - 5.40 M/uL    Hemoglobin 14.8 12.0 - 16.0 g/dL    Hematocrit 43.5 37.0 - 47.0 %    MCV 96.0 81.4 - 97.8 fL    MCH 32.7 27.0 - 33.0 pg    MCHC 34.0 33.6 - 35.0 g/dL    RDW 42.3 35.9 - 50.0 fL    Platelet Count 282 164 - 446 K/uL    MPV 9.6 9.0 - 12.9 fL    Neutrophils-Polys 70.70 44.00 - 72.00 %    Lymphocytes 22.00 22.00 - 41.00 %    Monocytes 6.00 0.00 - 13.40 %    Eosinophils 0.40 0.00 - 6.90 %    Basophils 0.60 0.00 - 1.80 %    Immature Granulocytes 0.30 0.00 - 0.90 %    Nucleated RBC 0.00 /100 WBC    Neutrophils (Absolute) 4.83 2.00 - 7.15 K/uL    Lymphs (Absolute) 1.50 1.00 - 4.80 K/uL    Monos (Absolute) 0.41 0.00 - 0.85 K/uL    Eos (Absolute) 0.03 0.00 - 0.51 K/uL    Baso (Absolute) 0.04 0.00 - 0.12 K/uL    Immature Granulocytes (abs) 0.02 0.00 - 0.11 K/uL    NRBC (Absolute) 0.00 K/uL   COMP METABOLIC PANEL   Result Value Ref Range    Sodium 136 135 - 145 mmol/L    Potassium 3.8 3.6 - 5.5 mmol/L    Chloride 104 96 - 112 mmol/L    Co2 22 20 - 33 mmol/L    Anion Gap 10.0 7.0 - 16.0    Glucose 97 65 - 99 mg/dL    Bun 8 8 " - 22 mg/dL    Creatinine 0.46 (L) 0.50 - 1.40 mg/dL    Calcium 9.2 8.5 - 10.5 mg/dL    AST(SGOT) 13 12 - 45 U/L    ALT(SGPT) 34 2 - 50 U/L    Alkaline Phosphatase 89 30 - 99 U/L    Total Bilirubin 0.3 0.1 - 1.5 mg/dL    Albumin 4.8 3.2 - 4.9 g/dL    Total Protein 7.9 6.0 - 8.2 g/dL    Globulin 3.1 1.9 - 3.5 g/dL    A-G Ratio 1.5 g/dL   RH TYPE FOR RHOGAM FROM E.D.   Result Value Ref Range    Emergency Department Rh Typing POS     Number Of Rh Doses Indicated ZERO    HCG QUANTITATIVE   Result Value Ref Range    Bhcg 39.9 (H) 0.0 - 5.0 mIU/mL   CORRECTED CALCIUM   Result Value Ref Range    Correct Calcium 8.6 8.5 - 10.5 mg/dL   ESTIMATED GFR   Result Value Ref Range    GFR (CKD-EPI) 134 >60 mL/min/1.73 m 2      Radiology:   The attending emergency physician has independently interpreted the diagnostic imaging associated with this visit and am waiting the final reading from the radiologist.   Radiologist interpretation:   US-OB 1ST TRIMESTER WITH TRANSVAGINAL (COMBO)   Final Result      No intrauterine pregnancy is identified.  Differential considerations include missed , early gestation and nonvisualized ectopic pregnancy.  Consequently, close clinical monitoring is advised.             COURSE & MEDICAL DECISION MAKING     1:46 PM -  Ordered labs: Complete Metabolic Panel, CBC with differentials, and HCG Quantitative and imaging: US-OB Transvaginal to evaluate their symptoms. Patient verbalizes understanding and agreement to this plan of care.     2:27 PM - Patient was seen and evaluated at bedside. Discussed her HCG level which is low however without a comparison there isn't a way to tell if she is having a miscarriage or the HCG is actually on an upward trend. Discussed follow-up for further evaluation in 2 days, if the HCG doubles than her pregnancy is likely proceeding normally however if it is lower or stagnant it is indicative of a miscarriage.     3:02 PM - Discussed US results with the patient  reporting that like previously stated she is likely too early into the pregnancy for it to be visible on the US. Reiterated follow-up. Patient verbalizes understanding and agreement to this plan of care.      ED Observation Status? No; Patient does not meet criteria for ED Observation.     INITIAL ASSESSMENT AND PLAN  Patient presents to the emergency department with vaginal bleeding and possible pregnancy.  Triage labs were ordered per protocol.  Will evaluate for possible ectopic pregnancy versus miscarriage versus bleeding in pregnancy.  Patient has a benign abdominal exam.  Overall she looks well.  No fevers.  Laboratory studies have returned and show a quant of 39.  I suspect she is very early in her pregnancy.  However I cannot exclude that this is a decreasing level she has been having bleeding for the last couple of days.  Patient is Rh+.  She has a normal hemoglobin.  Ultrasound showed no IUP.  With a level of 39 I think this is expected.  Patient will be discharged home with follow-up in 2 days for repeat quant.  She has been referred to pregnancy center.  She is also advised to return here if she cannot get in with the pregnancy center.  Bleeding precautions were given.  Return precautions were given.    ADDITIONAL PROBLEM LIST AND DISPOSITION    Discussion of management with other QHP or appropriate source(s): None     Escalation of care considered, and ultimately not performed: the patient was evaluated by me, after discussion I have recommended the patient to be discharged.      Decision tools and prescription drugs considered including, but not limited to:  Repeat evaluation in 2 days with repeat quant to better determine if this is a miscarriage versus early pregnancy .  Discussed cannot exclude ectopic pregnancy at this point.    Patient will be discharged home.    FOLLOW UP:  [facility] [address]  [place] [id number] [phone]  Schedule an  appointment as soon as possible for a visit in 2 days  If symptoms worsen, return to the er.    FINAL DIAGNOSIS  1. Vaginal bleeding in pregnancy, first trimester      The note accurately reflects work and decisions made by me.  Liza Jean M.D.  3/11/2023  3:18 PM     Phillip SCHMITT (Oracio), am scribing for, and in the presence of, Liza Jean M.D..    Electronically signed by: Phillip Khan (Oracio), 3/11/2023    Liza SCHMITT M.D. personally performed the services described in this documentation, as scribed by Phillip Khan in my presence, and it is both accurate and complete.

## 2023-03-11 NOTE — Clinical Note
Liza Pearson was seen and treated in our emergency department on 3/11/2023.  She may return to work on 03/14/2023.       If you have any questions or concerns, please don't hesitate to call.      Liza Jean M.D.

## 2023-03-13 ENCOUNTER — HOSPITAL ENCOUNTER (EMERGENCY)
Facility: MEDICAL CENTER | Age: 28
End: 2023-03-13
Attending: EMERGENCY MEDICINE
Payer: MEDICAID

## 2023-03-13 ENCOUNTER — TELEPHONE (OUTPATIENT)
Dept: OBGYN | Facility: CLINIC | Age: 28
End: 2023-03-13
Payer: MEDICAID

## 2023-03-13 VITALS
OXYGEN SATURATION: 98 % | WEIGHT: 160.72 LBS | RESPIRATION RATE: 16 BRPM | DIASTOLIC BLOOD PRESSURE: 74 MMHG | TEMPERATURE: 98.7 F | SYSTOLIC BLOOD PRESSURE: 135 MMHG | HEART RATE: 75 BPM | BODY MASS INDEX: 26.74 KG/M2

## 2023-03-13 DIAGNOSIS — Z32.01 PREGNANCY EXAMINATION OR TEST, POSITIVE RESULT: ICD-10-CM

## 2023-03-13 LAB — B-HCG SERPL-ACNC: 7 MIU/ML (ref 0–5)

## 2023-03-13 PROCEDURE — 99284 EMERGENCY DEPT VISIT MOD MDM: CPT

## 2023-03-13 PROCEDURE — 36415 COLL VENOUS BLD VENIPUNCTURE: CPT

## 2023-03-13 PROCEDURE — 84702 CHORIONIC GONADOTROPIN TEST: CPT

## 2023-03-13 ASSESSMENT — FIBROSIS 4 INDEX: FIB4 SCORE: 0.22

## 2023-03-13 NOTE — ED TRIAGE NOTES
Pt to triage .  Chief Complaint   Patient presents with    Abdominal Pain     Pt seen 3/11 told to come back for continued abd pain     Pregnancy

## 2023-03-13 NOTE — ED NOTES
Pt here for repeat labs was seen on 3/11 had US and blood work. Pt having improvement on pain from 3/11

## 2023-03-13 NOTE — ED PROVIDER NOTES
ED Provider Note    CHIEF COMPLAINT  Chief Complaint   Patient presents with    Abdominal Pain     Pt seen 3/11 told to come back for continued abd pain     Pregnancy       EXTERNAL RECORDS REVIEWED  Other ER note from 3/11/2023 noted.  Patient was seen for cramping and spotting in the setting of a positive pregnancy test.  Patient is Rh+.  Beta quant of 39.    HPI/ROS  LIMITATION TO HISTORY   Select: : None  OUTSIDE HISTORIAN(S):  Friend who speaks English and translates intermittently    Liza Pearson is a 28 y.o. G3, P1 Ab1 female with LMP of 2023 who presents for repeat hCG.    Patient states she was seen here in the ER 2 days ago and was told she needed repeat labs.  She called the [facility] who said they could not do the labs today.  Patient went to two [facility] that said they did not have the necessary order for the lab to be drawn.    Patient reports mild cramping.  She denies continued bleeding.  She denies fever, chills, vomiting.    PAST MEDICAL HISTORY     Prior spontaneous /miscarriage    SURGICAL HISTORY   has a past surgical history that includes primary c section (N/A, 2021).    FAMILY HISTORY  No family history on file.    SOCIAL HISTORY  Social History     Tobacco Use    Smoking status: Never    Smokeless tobacco: Never   Vaping Use    Vaping Use: Never used   Substance and Sexual Activity    Alcohol use: Never    Drug use: Never    Sexual activity: Not Currently     Partners: Male       CURRENT MEDICATIONS  Home Medications       Reviewed by Sheron Ugalde R.N. (Registered Nurse) on 23 at 1245  Med List Status: Partial     Medication Last Dose Status   COVID-19 mRNA Vaccine, Pfizer, (PFIZER-elmenus COVID-19 VACC) 30 MCG/0.3ML Suspension injection  Active                    ALLERGIES  No Known Allergies    PHYSICAL EXAM  VITAL SIGNS: /74   Pulse 75   Temp 37.1 °C (98.7 °F) (Temporal)   Resp 16   Wt 72.9 kg (160 lb 11.5 oz)   LMP  2023 (Exact Date)   SpO2 98%   BMI 26.74 kg/m²    General:  WDWN  female, nontoxic appearing in NAD; A+Ox3; V/S as above; afebrile, not tachycardic or hypotensive  Skin: warm and dry; good color; no rash  HEENT: NCAT; EOMs intact; PERRL; no scleral icterus   Neck: FROM  Cardiovascular: Regular heart rate and rhythm.  No murmurs, rubs, or gallops  Lungs: No respiratory distress or tachypnea; Clear to auscultation with good air movement bilaterally.  No wheezes, rhonchi, or rales.   Abdomen: BS present; soft; NTND; no rebound, guarding, or rigidity.  No organomegaly or pulsatile mass  Extremities: LOPEZ x 4; no e/o trauma  Neurologic: CNs III-XII grossly intact; speech clear; distal sensation intact; strength 5/5 UE/LEs  Psychiatric: Appropriate affect, normal mood      DIAGNOSTIC STUDIES / PROCEDURES  LABS  Results for orders placed or performed during the hospital encounter of 23   HCG QUANTITATIVE   Result Value Ref Range    Bhcg 7.0 (H) 0.0 - 5.0 mIU/mL         RADIOLOGY  None    COURSE & MEDICAL DECISION MAKING    ED Observation Status? No; Patient does not meet criteria for ED Observation.     INITIAL ASSESSMENT, COURSE AND PLAN  Care Narrative: This is a 28-year-old  Ab1 who is approximately 8 weeks by dates who presents for repeat beta quant.    Patient was seen here 2 days ago for cramping and spotting.   Her ultrasound at that time showed no IUP.  Beta quant was 39/low.  Patient denies ongoing bleeding and has very mild cramping.  She denies fever.  She is afebrile and nontoxic-appearing with a soft, nonsurgical abdomen.  She is not hypotensive or tachycardic.  Repeat beta quant was 7.  I suspect complete or incomplete miscarriage.  Patient will follow-up with [facility] women[facility] tomorrow.  I do not feel she requires a repeat ultrasound as yesterday's did not show an IUP and today's beta quant is even lower.  I do not suspect ectopic pregnancy at this time.  She may have already  passed products of conception.       FINAL DIAGNOSIS  1. Pregnancy examination or test, positive result        Electronically signed by: Itzel Hines M.D., 3/13/2023 1:43 PM

## 2023-03-13 NOTE — TELEPHONE ENCOUNTER
03/13/23  9:42 AM   service used, ID #[id number]  Call transferred to me by someone in the office. Pt went to the ER for cramping and spotting early in pregnancy. Today pt is having cramping. Pt was told to be seen by today and to get HCG labs done. Informed pt earliest available will be Wednesday and I will request HCG labs from provider to get done before appt. Pt agreed to plan. Pt agreed to going to lab tomorrow.      03/14/23  1:14 PM   service used, ID #[id number]  Informed patient of Dr Costa's instructions   Her quant is essentially zero (7). She can follow up in 2 weeks for exam and discuss future plan contraception vs pregnancy attempt    Rescheduled for two weeks.

## 2023-03-13 NOTE — DISCHARGE INSTRUCTIONS
Follow-up with [facility] for a repeat ultrasound and repeat labs

## 2023-03-13 NOTE — ED NOTES
Discharged home with instruction to follow up with [facility]. Pt to return for increased pain increased bleeding or s/s infection    [facility]  Office: [address], DO, Erika Junior, DO, Migue Mayer, DO, Manuela David Blood, DO, Arely Ngo MD, Juliann Dias MD, Preston Lozada MD, Bay Wang MD,  Gavi Boyce MD, Patricia Acosta MD, Roopa Quispe, DO, Jolanta Pan MD,  Miriam Peck MD, Be Edge MD, Shen Cash, DO, Yesica Sharp MD, Severiano Border, MD, Ramez Raphael DO, Fannie Tracy MD, Kristina Rock MD, Suzan Castellanos MD, Felicia Herrmann MD, Franklin Khan MD, Judy Garcia, DO, Anabel Pelletier MD, Alison Gutierrez MD, Baltazar Reid, CNP,  Joe Sharma, CNP, Dontae Nguyen, CNP, Maylin Andrew, CNP,  Juan David Guevara, [facility], Chloe Tinsley, CNP, China Sanchez, CNP, Kirby Moore, CNP, Oleg iVtale, CNP, Rachel Archer, CNP, Catrachita Fleming, PA-C, Fiordaliza Salter, CNS, Claudell Belfast, CNP, James Mancilla, [facility]    Progress Note    3/9/2023    10:13 AM    Name:   Deion Danielle  MRN:     [id number]     [place]:      [de-identified]   Room:   2007/2007-02   Day:  0  Admit Date:  3/8/2023 12:32 PM    PCP:   Nancy Catherine MD  Code Status:  Full Code    Subjective:     C/C:   Chief Complaint   Patient presents with    Abdominal Pain    Emesis     For the last 4 days, unable to keep down water     Interval History Status: not changed. Pt continues to report diarrhea, vomiting, acid reflux, and decreased appetite. Brief History:     3/8 - Patient reports to the emergency department with intractable nausea and vomiting with diarrhea. Patient has had the symptoms several times over the past several years. Typically she is hydrated in the emergency department and discharged. It was thought that this was cyclical vomiting secondary to marijuana use however she has not used marijuana for several years by her own report.   At the patient's most recent hospital admission for intractable nausea with vomiting she was given a referral to gastroenterology for outpatient GI work-up however patient did not follow-up with the referral as instructed. 3/9 - Pt endorses 10 episodes of diarrhea this morning, one episode of vomiting, multiple episodes of dry heaving, and inability to tolerate food. Pt is able to tolerate drinking small amounts of water. Review of Systems:     Review of Systems   Constitutional:  Positive for appetite change, chills and fatigue. Negative for activity change and fever. HENT:  Negative for sinus pressure and sinus pain. Eyes:  Negative for photophobia and visual disturbance. Respiratory:  Negative for cough, shortness of breath and wheezing. Cardiovascular: Negative. Negative for chest pain, palpitations and leg swelling. Gastrointestinal:  Positive for abdominal pain, diarrhea, nausea and vomiting. Negative for constipation. Endocrine: Negative for cold intolerance, heat intolerance and polyuria. Genitourinary:  Negative for difficulty urinating and urgency. Musculoskeletal:  Negative for arthralgias and myalgias. Skin: Negative. Negative for wound. Neurological:  Positive for weakness. Negative for dizziness, syncope and light-headedness. Hematological:  Negative for adenopathy. Does not bruise/bleed easily. Psychiatric/Behavioral:  Negative for agitation and confusion. The patient is nervous/anxious. Medications: Allergies:     Allergies   Allergen Reactions    Omeprazole      Pt had a seizure       Current Meds:   Scheduled Meds:    scopolamine  1 patch TransDERmal Q72H    LORazepam  2 mg IntraVENous Once    sodium chloride  80 mL IntraVENous Once    dicyclomine  10 mg Oral 4x Daily AC & HS    levETIRAcetam  750 mg Oral BID    pantoprazole  20 mg Oral BID AC    sodium chloride flush  5-40 mL IntraVENous 2 times per day    enoxaparin  30 mg SubCUTAneous Daily     Continuous Infusions:    dextrose 5% and 0.45% NaCl with KCl 20 mEq 125 mL/hr at 03/09/23 1129 sodium chloride       PRN Meds: albuterol sulfate HFA, loperamide, sodium chloride flush, cyclobenzaprine, sodium chloride flush, sodium chloride, potassium chloride **OR** potassium alternative oral replacement **OR** potassium chloride, magnesium sulfate, ondansetron **OR** ondansetron, acetaminophen **OR** acetaminophen, polyethylene glycol, aluminum & magnesium hydroxide-simethicone, prochlorperazine, diphenhydrAMINE    Data:     Past Medical History:   has a past medical history of GERD (gastroesophageal reflux disease), Intractable nausea and vomiting, Irritable bowel syndrome without diarrhea, Moderate persistent asthma without complication, Moderate persistent asthma without complication, and Seizures ([facility] [address] Social History:   reports that she quit smoking about 9 years ago. Her smoking use included cigarettes. She started smoking about 19 years ago. She has a 10.00 pack-year smoking history. She has never used smokeless tobacco. She reports that she does not currently use drugs after having used the following drugs: Marijuana Bryce Mustard). She reports that she does not drink alcohol. Family History:   Family History   Problem Relation Age of Onset    High Cholesterol Mother     Dementia Mother     Dementia Father     Birth Defects Neg Hx     Diabetes Neg Hx        Vitals:  /70   Pulse 50   Temp 99 °F (37.2 °C) (Oral)   Resp 16   Ht 5' 2\" (1.575 m)   Wt 106 lb (48.1 kg)   LMP 2023   SpO2 98%   BMI 19.39 kg/m²   Temp (24hrs), Av.3 °F (36.8 °C), Min:97.7 °F (36.5 °C), Max:99 °F (37.2 °C)    No results for input(s): POCGLU in the last 72 hours. I/O (24Hr):     Intake/Output Summary (Last 24 hours) at 3/9/2023 1013  Last data filed at 3/9/2023 0602  Gross per 24 hour   Intake --   Output 1000 ml   Net -1000 ml       Labs:  Hematology:  Recent Labs     23  1307 23  0613   WBC 7.6 10.7   RBC 4.71 4.36   HGB 14.4 13.4   HCT 40.3 37.4   MCV 85.6 85.8   MCH 30.6 30.7   MCHC 35.7* 35.8*   RDW 12.4 12.8    353   MPV 9.7 9.4     Chemistry:  Recent Labs     03/08/23  1307 03/09/23  0613    136   K 3.7 3.8    103   CO2 23 21   GLUCOSE 153* 148*   BUN 11 7   CREATININE 0.85 0.57   MG  --  1.8   ANIONGAP 13 12   LABGLOM >60 >60   CALCIUM 9.6 9.2   PHOS  --  2.4*     Recent Labs     03/08/23  1307 03/09/23  0613   PROT 7.5 6.8   LABALBU 4.7 4.3   AST 23 29   ALT 17 20   ALKPHOS 51 43   BILITOT 0.4 0.5   BILIDIR 0.1  --    AMYLASE 103*  --    LIPASE 14 12*     ABG:No results found for: POCPH, PHART, PH, POCPCO2, EMX1NLB, PCO2, POCPO2, PO2ART, PO2, POCHCO3, VVM9FTT, HCO3, NBEA, PBEA, BEART, BE, THGBART, THB, RFZ3MBN, UTFL0YJG, O4NJIVHV, O2SAT, FIO2  Lab Results   Component Value Date/Time    SPECIAL NOT REPORTED 03/21/2012 02:50 PM     Lab Results   Component Value Date/Time    CULTURE NO SIGNIFICANT GROWTH 03/21/2012 02:50 PM    CULTURE  03/21/2012 02:50 PM     [employer] [address] [facility] 3 [phone]       Radiology:  CT ABDOMEN PELVIS W IV CONTRAST Additional Contrast? None    Result Date: 3/8/2023  Polycystic kidney disease Uterine fibroids Very little mesenteric fat with poor delineation of the intra-abdominal and intrapelvic anatomy. Physical Examination:        Physical Exam  Vitals reviewed. Constitutional:       General: She is awake. Appearance: She is underweight. She is ill-appearing. HENT:      Head: Normocephalic and atraumatic. Nose: Nose normal.   Eyes:      General: Lids are normal. Vision grossly intact. Extraocular Movements: Extraocular movements intact. Conjunctiva/sclera: Conjunctivae normal.      Pupils: Pupils are equal, round, and reactive to light. Neck:      Trachea: Trachea normal.   Cardiovascular:      Rate and Rhythm: Normal rate and regular rhythm. Pulses:           Radial pulses are 2+ on the right side and 2+ on the left side.    Pulmonary:      Effort: Pulmonary effort is normal. Breath sounds: Normal breath sounds. Abdominal:      General: Abdomen is flat. Tenderness: There is abdominal tenderness in the right lower quadrant. There is guarding. Comments: Pt in left lateral position,appearing in significant pain, reporting abdominal discomfort, nausea, frequent diarrhea, and inability to tolerate eating. Pt has increased RLQ tenderness and guarding with palpation, non-radiating, described as sharp and burning, unchanged with position or movement, without rebound tenderness, ongoing, unrelieved by medications, increased with eating or drinking. Pt endorses decreased vomiting after zofran IV administration. Musculoskeletal:         General: Normal range of motion. Cervical back: Full passive range of motion without pain, normal range of motion and neck supple. Right lower leg: No edema. Left lower leg: No edema. Skin:     General: Skin is warm and dry. Capillary Refill: Capillary refill takes less than 2 seconds. Coloration: Skin is ashen. Neurological:      General: No focal deficit present. Mental Status: She is alert and oriented to person, place, and time. Mental status is at baseline. GCS: GCS eye subscore is 4. GCS verbal subscore is 5. GCS motor subscore is 6. Sensory: Sensation is intact. Motor: Motor function is intact. Coordination: Coordination is intact. Gait: Gait is intact. Psychiatric:         Mood and Affect: Mood normal.         Behavior: Behavior normal. Behavior is cooperative. Thought Content:  Thought content normal.         Judgment: Judgment normal.       Assessment:        Hospital Problems             Last Modified POA    * (Principal) Intractable vomiting 3/8/2023 Yes    Intractable vomiting with nausea 3/8/2023 Yes    History of seizure disorder 3/8/2023 Yes    Moderate persistent asthma without complication 1/1/1506 Yes    Gastroesophageal reflux disease with esophagitis without hemorrhage 3/8/2023 Yes       Plan:        Intractable nausea with vomiting and a known history of GERD with esophagitis  PPI diet advancement as tolerated, IV hydration  Antiemetics with Zofran, Compazine  Add scopolamine patch, single dose ativan  Imodium for diarrhea  Pt education regarding food/drinks exacerbating GERD/diarrhea  CT abdomen findings noted, plan for GYN outpatient f/u   Moderate persistent asthma without acute exacerbation  Home regimen as ordered  History of seizure disorder  Home regimen as ordered  Seizure precautions  Abdominal pain  Secondary to continuous retching and frequent loose stool. Will manage pain via symptom management.    Further diagnostic studies unnecessary at this time     PRESTON Henry - NP  3/9/2023  10:13 AM

## 2023-03-14 DIAGNOSIS — O03.9 SPONTANEOUS ABORTION: ICD-10-CM

## 2023-03-29 ENCOUNTER — GYNECOLOGY VISIT (OUTPATIENT)
Dept: OBGYN | Facility: CLINIC | Age: 28
End: 2023-03-29
Payer: MEDICAID

## 2023-03-29 VITALS — BODY MASS INDEX: 27.26 KG/M2 | SYSTOLIC BLOOD PRESSURE: 128 MMHG | WEIGHT: 163.8 LBS | DIASTOLIC BLOOD PRESSURE: 74 MMHG

## 2023-03-29 DIAGNOSIS — O03.9 SAB (SPONTANEOUS ABORTION): Primary | ICD-10-CM

## 2023-03-29 DIAGNOSIS — Z30.011 ENCOUNTER FOR INITIAL PRESCRIPTION OF CONTRACEPTIVE PILLS: ICD-10-CM

## 2023-03-29 LAB
POCT INT CON NEG: NEGATIVE
POCT INT CON POS: POSITIVE
POCT URINE PREGNANCY TEST: NEGATIVE

## 2023-03-29 PROCEDURE — 81025 URINE PREGNANCY TEST: CPT | Performed by: OBSTETRICS & GYNECOLOGY

## 2023-03-29 PROCEDURE — 99213 OFFICE O/P EST LOW 20 MIN: CPT | Mod: 25 | Performed by: OBSTETRICS & GYNECOLOGY

## 2023-03-29 RX ORDER — LEVONORGESTREL AND ETHINYL ESTRADIOL 0.1-0.02MG
1 KIT ORAL DAILY
Qty: 28 TABLET | Refills: 11 | Status: SHIPPED | OUTPATIENT
Start: 2023-03-29 | End: 2023-09-12

## 2023-03-29 ASSESSMENT — FIBROSIS 4 INDEX: FIB4 SCORE: 0.22

## 2023-03-29 NOTE — PROGRESS NOTES
Subjective     Liza Pearson is a 28 y.o. female who presents with Gynecologic Exam (ER F/U)    CC: Follow up after SAB    HPI: Liza is a 27yo  who presents for follow-up after a spontaneous miscarriage.  Her last beta-hCG on 3/13/2023 was 7, and she had an ultrasound that showed no intrauterine pregnancy identified, and no concern for an ectopic. She is no longer having any bleeding. UPT is negative in the office. She has some questions as to why she had a miscarriage.  She is interested in taking oral contraceptive pills. She may want to conceive again soon, but she is interested in taking OCPs in the short term.           Gynecologic Exam      ROS           Objective     /74 (BP Location: Right arm, Patient Position: Sitting, BP Cuff Size: Adult)   Wt 163 lb 12.8 oz   LMP 2023 (Exact Date)   BMI 27.26 kg/m²      Physical Exam  Constitutional:       Appearance: Normal appearance.   HENT:      Head: Normocephalic and atraumatic.      Mouth/Throat:      Mouth: Mucous membranes are moist.   Eyes:      Extraocular Movements: Extraocular movements intact.   Pulmonary:      Effort: Pulmonary effort is normal. No respiratory distress.   Musculoskeletal:      Cervical back: Normal range of motion.   Neurological:      Mental Status: She is alert.                           Assessment & Plan     Liza was seen today for gynecologic exam.    Diagnoses and all orders for this visit:    SAB (spontaneous )  - I discussed that no further follow up is needed as it seems like she has passed all POC based on labs and US  - Discussed that most likely she has had a miscarriage because of possibly abnormal chromosomes or genetic material that makes up the fetus. Unfortunately, we cannot prevent this. Since she had a normal pregnancy previously, then most likely she will be able to have another normal pregnancy and live birth    Encounter for initial prescription of contraceptive pills  -  she desired to take OCPs for the short term until she wants to attempt to conceive again. No contraindications to starting OCPs. I discussed that she can start taking OCPs with the first day of her next cycle.   -     levonorgestrel-ethinyl estradiol (AVIANE) 0.1-20 MG-MCG per tablet; Take 1 Tablet by mouth every day.  -     POCT Pregnancy    Total Time: 20 minutes spent in chart review, exam, counseling and documention     Jenelle Calabrese M.D.

## 2023-03-29 NOTE — PROGRESS NOTES
Patient here for a GYN follow up.   Last seen on 02/15/22  c/o Miscarriage F/U  pharmacy verified.  Patient phone #: [phone] (home)

## 2023-04-21 ENCOUNTER — HOSPITAL ENCOUNTER (OUTPATIENT)
Dept: LAB | Facility: MEDICAL CENTER | Age: 28
End: 2023-04-21
Payer: MEDICAID

## 2023-04-21 LAB
ALBUMIN SERPL BCP-MCNC: 4.3 G/DL (ref 3.2–4.9)
ALBUMIN/GLOB SERPL: 1.2 G/DL
ALP SERPL-CCNC: 95 U/L (ref 30–99)
ALT SERPL-CCNC: 58 U/L (ref 2–50)
ANION GAP SERPL CALC-SCNC: 12 MMOL/L (ref 7–16)
AST SERPL-CCNC: 23 U/L (ref 12–45)
BASOPHILS # BLD AUTO: 0.8 % (ref 0–1.8)
BASOPHILS # BLD: 0.05 K/UL (ref 0–0.12)
BILIRUB SERPL-MCNC: 0.3 MG/DL (ref 0.1–1.5)
BUN SERPL-MCNC: 13 MG/DL (ref 8–22)
CALCIUM ALBUM COR SERPL-MCNC: 8.9 MG/DL (ref 8.5–10.5)
CALCIUM SERPL-MCNC: 9.1 MG/DL (ref 8.5–10.5)
CHLORIDE SERPL-SCNC: 105 MMOL/L (ref 96–112)
CHOLEST SERPL-MCNC: 203 MG/DL (ref 100–199)
CO2 SERPL-SCNC: 23 MMOL/L (ref 20–33)
CREAT SERPL-MCNC: 0.5 MG/DL (ref 0.5–1.4)
EOSINOPHIL # BLD AUTO: 0.04 K/UL (ref 0–0.51)
EOSINOPHIL NFR BLD: 0.7 % (ref 0–6.9)
ERYTHROCYTE [DISTWIDTH] IN BLOOD BY AUTOMATED COUNT: 42 FL (ref 35.9–50)
EST. AVERAGE GLUCOSE BLD GHB EST-MCNC: 123 MG/DL
GFR SERPLBLD CREATININE-BSD FMLA CKD-EPI: 131 ML/MIN/1.73 M 2
GLOBULIN SER CALC-MCNC: 3.5 G/DL (ref 1.9–3.5)
GLUCOSE SERPL-MCNC: 103 MG/DL (ref 65–99)
HBA1C MFR BLD: 5.9 % (ref 4–5.6)
HCT VFR BLD AUTO: 41.8 % (ref 37–47)
HDLC SERPL-MCNC: 42 MG/DL
HGB BLD-MCNC: 14.2 G/DL (ref 12–16)
IMM GRANULOCYTES # BLD AUTO: 0.02 K/UL (ref 0–0.11)
IMM GRANULOCYTES NFR BLD AUTO: 0.3 % (ref 0–0.9)
LDLC SERPL CALC-MCNC: 130 MG/DL
LYMPHOCYTES # BLD AUTO: 1.95 K/UL (ref 1–4.8)
LYMPHOCYTES NFR BLD: 32.3 % (ref 22–41)
MCH RBC QN AUTO: 32.6 PG (ref 27–33)
MCHC RBC AUTO-ENTMCNC: 34 G/DL (ref 33.6–35)
MCV RBC AUTO: 95.9 FL (ref 81.4–97.8)
MONOCYTES # BLD AUTO: 0.32 K/UL (ref 0–0.85)
MONOCYTES NFR BLD AUTO: 5.3 % (ref 0–13.4)
NEUTROPHILS # BLD AUTO: 3.66 K/UL (ref 2–7.15)
NEUTROPHILS NFR BLD: 60.6 % (ref 44–72)
NRBC # BLD AUTO: 0 K/UL
NRBC BLD-RTO: 0 /100 WBC
PLATELET # BLD AUTO: 346 K/UL (ref 164–446)
PMV BLD AUTO: 10.3 FL (ref 9–12.9)
POTASSIUM SERPL-SCNC: 4.2 MMOL/L (ref 3.6–5.5)
PROT SERPL-MCNC: 7.8 G/DL (ref 6–8.2)
RBC # BLD AUTO: 4.36 M/UL (ref 4.2–5.4)
SODIUM SERPL-SCNC: 140 MMOL/L (ref 135–145)
TRIGL SERPL-MCNC: 154 MG/DL (ref 0–149)
TSH SERPL DL<=0.005 MIU/L-ACNC: 0.62 UIU/ML (ref 0.38–5.33)
WBC # BLD AUTO: 6 K/UL (ref 4.8–10.8)

## 2023-04-21 PROCEDURE — 83036 HEMOGLOBIN GLYCOSYLATED A1C: CPT

## 2023-04-21 PROCEDURE — 85025 COMPLETE CBC W/AUTO DIFF WBC: CPT

## 2023-04-21 PROCEDURE — 80061 LIPID PANEL: CPT

## 2023-04-21 PROCEDURE — 36415 COLL VENOUS BLD VENIPUNCTURE: CPT

## 2023-04-21 PROCEDURE — 80053 COMPREHEN METABOLIC PANEL: CPT

## 2023-04-21 PROCEDURE — 84443 ASSAY THYROID STIM HORMONE: CPT

## 2023-08-07 ENCOUNTER — NON-PROVIDER VISIT (OUTPATIENT)
Dept: OBGYN | Facility: CLINIC | Age: 28
End: 2023-08-07
Payer: MEDICAID

## 2023-08-07 DIAGNOSIS — Z32.00 ENCOUNTER FOR PREGNANCY TEST, RESULT UNKNOWN: ICD-10-CM

## 2023-08-07 LAB
POCT INT CON NEG: NEGATIVE
POCT INT CON POS: POSITIVE
POCT URINE PREGNANCY TEST: POSITIVE

## 2023-08-07 PROCEDURE — 81025 URINE PREGNANCY TEST: CPT | Performed by: OBSTETRICS & GYNECOLOGY

## 2023-08-07 NOTE — PROGRESS NOTES
Pt here for walk in pregnancy  test. LMP: 7/10/23. Test positive in clinic today. Pt informed. Instructed to schedule NOB with . Pt had no further questions or concerns.

## 2023-09-07 ENCOUNTER — APPOINTMENT (OUTPATIENT)
Dept: RADIOLOGY | Facility: IMAGING CENTER | Age: 28
End: 2023-09-07
Payer: MEDICAID

## 2023-09-07 DIAGNOSIS — Z3A.10 10 WEEKS GESTATION OF PREGNANCY: ICD-10-CM

## 2023-09-07 PROCEDURE — 76801 OB US < 14 WKS SINGLE FETUS: CPT | Mod: TC | Performed by: NURSE PRACTITIONER

## 2023-09-11 NOTE — PROGRESS NOTES
"New OB Visit    Subjective   Liza Pearson is a 28 y.o.  at 9w1d by LMP c/w US at 8w4d with final LOLA of 4/15/2024 who presents for prenatal care. Today is her first prenatal visit at [facility].   I have reviewed the patients' medical, surgical, gynecological, obstetrical, social, and family histories, medications and available lab results and pertinent notes are as follows:     No ER visits  No previous care in this pregnancy.     She has no complaints . Reports absent FM. Denies VB, LOF, or cramping.  Denies dysuria, vaginal DC.      Pt is partnered with and lives with \"Juni.\" FOB is involved and supportive and the same FOB as her other chlid. She is currently working as cook at [employer], no heavy lifting, no chemical exposure.    Pregnancy is planned and desired.      OB History    Para Term  AB Living   4 1 1 0 2 1   SAB IAB Ectopic Molar Multiple Live Births   2 0 0 0 0 1       GYN Hx:  Denies fibroids, abnormal pap smears, STDs.   Reports history of ovarian cysts - was taking metformin  Denies having surgery on her cervix, or ovaries in the past.    Last pap smear was 2021: NILM  LMP: 7/10/23 (exact date)    OB Hx: ; hx C/S x1 for FTP in 2nd stage - desires TOLAC; Hx GDMA2 and PP pre-eclampsia (was on BP medication PP)    History of HSV I or II in self or partner: no  History of STIs in self or partner: no  History of Thyroid problems: no  History of Varicella: no  History of depression or anxiety: no    Denies use of illicit drugs, ETOH, or tobacco in pregnancy    Past Medical History:   Diagnosis Date    Diabetes (HCC)     Pre-Diabetic      Past Surgical History:   Procedure Laterality Date    PRIMARY C SECTION N/A 2021    Procedure:  SECTION, PRIMARY;  Surgeon: Harriet Gonzalez D.O.;  Location: SURGERY LABOR [facility] DELIVERY;  Service: Obstetrics     Social History     Socioeconomic History    Marital status: Single   Tobacco Use    Smoking " "status: Never    Smokeless tobacco: Never   Vaping Use    Vaping Use: Never used   Substance and Sexual Activity    Alcohol use: Never    Drug use: Never    Sexual activity: Not Currently     Partners: Male     Birth control/protection: None     Comment: not . Planned pregancy      Family History: History reviewed. No pertinent family history.      Infection Prevention           Objective:   Allergies: Patient has no known allergies.  Objective:/70   Ht 5' 3.98\"   Wt 158 lb 6.4 oz      Prenatal Physical:  General Exam:  HEENT: normal    Heart: normal    Thyroid: normal    Lungs: normal    Neurological: normal    Abdomen: normal    Skin: normal    Extremities: normal    Pelvic Exam:  Vulva:  Normal  Uterus (wks):  9       FHT: 168 BPM via TAUS on m-mode    EPDS: 2    Lab: No results found for this or any previous visit (from the past 672 hour(s)).    Ultrasound: Reviewed initial scan and estimated date of delivery is 4/15/2024 by LMP c/w US at 8w4d.      Assessment:  --Normal exam at 9w1d  --Size consistent with dates  --FHR positive  Encounter Diagnoses   Name Primary?    Encounter for supervision of other normal pregnancy in first trimester     History of pre-eclampsia - start 81 mg ASA at 12 weeks     History of  delivery - desires TOLAC     History of GDMA2         Plan:  Reviewed the patients risk factors for this pregnancy and recommend the need for   Complete OB US in: 10-11 weeks - ordered today, slip and instructions given to patient on how to schedule; verbalized understanding.  Additional labs/imaging: early 1 hour GTT for hx GDM, baseline pre-eclampsia labs for hx of pre-eclampsia  Antepartum fetal monitoring requirements: routine at this time  Genetic screening was discussed with literature on Prenatal Screening, Cystic Fibrosis, and SMA provided and the patient plans to:  declined Quad screen  declined carrier testing  accepted NIPT - ordered today; instructed to wait until after " 10 weeks to collect  accepted MSAFP - desires but too early - order at 15 weeks  Discuss importance of adequate water intake, taking PNV, healthy nutritional choices, and avoidence of ETOH/Tobacco/drugs.  Discuss importance of exercise, as well as rest   If has nausea, take Vitamin B6 25mg TID with doxylamine/Unisom 12.5mg at night  Prenatal labs and UDS ordered - lab slip and instructions given  Discussed OB care at [facility] including midwifery care, group practice, and call schedules  GC/CT and vaginal pathogen collected today.    Pap up to date - next due 06/2024.  Pregnancy guide provided and reviewed safe medications in pregnancy page  Start ASA 81 mg daily for pre-eclampsia prevention d/t hx of pre-eclampsia in prior pregnancy.  Return precautions reviewed - patient verbalized understanding  Follow up in 4 weeks with MD for TOLAC counseling for next visit and PRN    Orders Placed This Encounter    US-OB 2ND 3RD TRI COMPLETE    PREGNANCY CENTER STANDARD PRENATAL PANEL    URINE DRUG SCREEN    Chlamydia/GC, PCR (Genital/Anal swab)    VAGINAL PATHOGENS DNA PANEL    GLUCOSE 1HR GESTATIONAL    Comp Metabolic Panel    NIPT FOR FETAL ANEUPLOIDY (PANORAMA) WITH MICRODELETIONS    PROTEIN/CREAT RATIO URINE    Prenatal MV-Min-Fe Fum-FA-DHA (PRENATAL 1 PO)    ferrous sulfate 325 (65 Fe) MG tablet    Aspirin 81 MG Cap         Pregnancy Checklist  Prenatal labs: ordered today  EPDS: 2 at NOB  Last Pap: 6/4/21: NILM  Quad screen/NIPT/MASFP/Carrier Screen: NIPT ordered today, MSAFP desired - order at next visit  Anatomy US: ordered today  3rd trimester labs:  Tdap:  Flu vaccine: not in flu season  Rhogam:   PP Contraception plan:  Bilateral salpingectomy:  GBS:   Hx C/S: yes, x1  Desires TOLAC?: yes  IOL plan:      Dai Campbell, C.N.M.    : RAZ Willson

## 2023-09-12 ENCOUNTER — HOSPITAL ENCOUNTER (OUTPATIENT)
Facility: MEDICAL CENTER | Age: 28
End: 2023-09-12
Payer: MEDICAID

## 2023-09-12 ENCOUNTER — INITIAL PRENATAL (OUTPATIENT)
Dept: OBGYN | Facility: CLINIC | Age: 28
End: 2023-09-12
Payer: MEDICAID

## 2023-09-12 ENCOUNTER — APPOINTMENT (OUTPATIENT)
Dept: OBGYN | Facility: CLINIC | Age: 28
End: 2023-09-12
Payer: MEDICAID

## 2023-09-12 VITALS
WEIGHT: 158.4 LBS | DIASTOLIC BLOOD PRESSURE: 70 MMHG | BODY MASS INDEX: 27.04 KG/M2 | HEIGHT: 64 IN | SYSTOLIC BLOOD PRESSURE: 128 MMHG

## 2023-09-12 DIAGNOSIS — Z34.81 ENCOUNTER FOR SUPERVISION OF OTHER NORMAL PREGNANCY IN FIRST TRIMESTER: ICD-10-CM

## 2023-09-12 DIAGNOSIS — Z87.59 HISTORY OF PRE-ECLAMPSIA: ICD-10-CM

## 2023-09-12 DIAGNOSIS — Z98.891 HISTORY OF CESAREAN DELIVERY: ICD-10-CM

## 2023-09-12 DIAGNOSIS — R73.03 PREDIABETES: ICD-10-CM

## 2023-09-12 DIAGNOSIS — Z86.32 HISTORY OF GESTATIONAL DIABETES MELLITUS (GDM): ICD-10-CM

## 2023-09-12 LAB
CANDIDA DNA VAG QL PROBE+SIG AMP: NEGATIVE
G VAGINALIS DNA VAG QL PROBE+SIG AMP: POSITIVE
T VAGINALIS DNA VAG QL PROBE+SIG AMP: NEGATIVE

## 2023-09-12 PROCEDURE — 87591 N.GONORRHOEAE DNA AMP PROB: CPT

## 2023-09-12 PROCEDURE — 3078F DIAST BP <80 MM HG: CPT

## 2023-09-12 PROCEDURE — 0500F INITIAL PRENATAL CARE VISIT: CPT

## 2023-09-12 PROCEDURE — 87660 TRICHOMONAS VAGIN DIR PROBE: CPT

## 2023-09-12 PROCEDURE — 87491 CHLMYD TRACH DNA AMP PROBE: CPT

## 2023-09-12 PROCEDURE — 87510 GARDNER VAG DNA DIR PROBE: CPT

## 2023-09-12 PROCEDURE — 87480 CANDIDA DNA DIR PROBE: CPT

## 2023-09-12 PROCEDURE — 3074F SYST BP LT 130 MM HG: CPT

## 2023-09-12 RX ORDER — FERROUS SULFATE 325(65) MG
325 TABLET ORAL DAILY
COMMUNITY
End: 2023-11-14

## 2023-09-12 ASSESSMENT — EDINBURGH POSTNATAL DEPRESSION SCALE (EPDS)
I HAVE LOOKED FORWARD WITH ENJOYMENT TO THINGS: AS MUCH AS I EVER DID
I HAVE BEEN SO UNHAPPY THAT I HAVE HAD DIFFICULTY SLEEPING: NOT AT ALL
I HAVE FELT SAD OR MISERABLE: NO, NOT AT ALL
THINGS HAVE BEEN GETTING ON TOP OF ME: NO, I HAVE BEEN COPING AS WELL AS EVER
I HAVE BLAMED MYSELF UNNECESSARILY WHEN THINGS WENT WRONG: NO, NEVER
I HAVE BEEN ANXIOUS OR WORRIED FOR NO GOOD REASON: YES, SOMETIMES
I HAVE BEEN ABLE TO LAUGH AND SEE THE FUNNY SIDE OF THINGS: AS MUCH AS I ALWAYS COULD
I HAVE BEEN SO UNHAPPY THAT I HAVE BEEN CRYING: NO, NEVER
I HAVE FELT SCARED OR PANICKY FOR NO GOOD REASON: NO, NOT AT ALL
THE THOUGHT OF HARMING MYSELF HAS OCCURRED TO ME: NEVER
TOTAL SCORE: 2

## 2023-09-12 ASSESSMENT — FIBROSIS 4 INDEX: FIB4 SCORE: 0.24

## 2023-09-12 NOTE — PROGRESS NOTES
NOB visit   LMP 07/10/2023 (exact date) with LOLA of 04/15/2024  Pt had US on 09/07/203 with LOLA of 04/14/2024  WT: 158.4 lb   BP: 128/70  Preferred pharmacy verified with pt.  Pt states no complaints or concerns today  Good # [phone]    Last pap: 06/04/2021 Negative  PT desires NIPT  EPDS Score: 2

## 2023-09-13 LAB
C TRACH DNA GENITAL QL NAA+PROBE: NEGATIVE
N GONORRHOEA DNA GENITAL QL NAA+PROBE: NEGATIVE
SPECIMEN SOURCE: NORMAL

## 2023-09-13 RX ORDER — METRONIDAZOLE 500 MG/1
500 TABLET ORAL 2 TIMES DAILY
Qty: 14 TABLET | Refills: 0 | Status: SHIPPED | OUTPATIENT
Start: 2023-09-13 | End: 2023-11-14

## 2023-09-14 ENCOUNTER — TELEPHONE (OUTPATIENT)
Dept: OBGYN | Facility: CLINIC | Age: 28
End: 2023-09-14
Payer: MEDICAID

## 2023-09-14 PROCEDURE — RXMED WILLOW AMBULATORY MEDICATION CHARGE

## 2023-09-14 NOTE — TELEPHONE ENCOUNTER
----- Message from Dai Campbell C.N.M. sent at 9/13/2023  6:35 PM PDT -----  Please call the patient and let her know she is + for BV. I've sent flagyl to her pharmacy for her to  ASAP. Please ensure she takes the entire course of antibiotics and does not drink alcohol while taking. Thanks.      Called pt and informed her test came back positive for BV, explained this is not an STI. Pt informed she needs to start antibiotics. Should take the full Tx and advised to take meds with food but not with milk and to avoid alcohol, sun exposure and intercourse while on Tx. Pharmacy verified with pt. Pt agreed and verbalized understanding.

## 2023-09-15 ENCOUNTER — PHARMACY VISIT (OUTPATIENT)
Dept: PHARMACY | Facility: MEDICAL CENTER | Age: 28
End: 2023-09-15
Payer: COMMERCIAL

## 2023-09-22 ENCOUNTER — HOSPITAL ENCOUNTER (OUTPATIENT)
Dept: LAB | Facility: MEDICAL CENTER | Age: 28
End: 2023-09-22
Payer: MEDICAID

## 2023-09-22 DIAGNOSIS — Z34.81 ENCOUNTER FOR SUPERVISION OF OTHER NORMAL PREGNANCY IN FIRST TRIMESTER: ICD-10-CM

## 2023-09-22 LAB
ABO GROUP BLD: NORMAL
ALBUMIN SERPL BCP-MCNC: 4.1 G/DL (ref 3.2–4.9)
ALBUMIN/GLOB SERPL: 1.4 G/DL
ALP SERPL-CCNC: 60 U/L (ref 30–99)
ALT SERPL-CCNC: 14 U/L (ref 2–50)
ANION GAP SERPL CALC-SCNC: 11 MMOL/L (ref 7–16)
AST SERPL-CCNC: 8 U/L (ref 12–45)
BILIRUB SERPL-MCNC: 0.3 MG/DL (ref 0.1–1.5)
BLD GP AB SCN SERPL QL: NORMAL
BUN SERPL-MCNC: 6 MG/DL (ref 8–22)
CALCIUM ALBUM COR SERPL-MCNC: 8.6 MG/DL (ref 8.5–10.5)
CALCIUM SERPL-MCNC: 8.7 MG/DL (ref 8.5–10.5)
CHLORIDE SERPL-SCNC: 101 MMOL/L (ref 96–112)
CO2 SERPL-SCNC: 23 MMOL/L (ref 20–33)
CREAT SERPL-MCNC: 0.46 MG/DL (ref 0.5–1.4)
CREAT UR-MCNC: 166.86 MG/DL
ERYTHROCYTE [DISTWIDTH] IN BLOOD BY AUTOMATED COUNT: 45.5 FL (ref 35.9–50)
GFR SERPLBLD CREATININE-BSD FMLA CKD-EPI: 133 ML/MIN/1.73 M 2
GLOBULIN SER CALC-MCNC: 3 G/DL (ref 1.9–3.5)
GLUCOSE 1H P 50 G GLC PO SERPL-MCNC: 183 MG/DL (ref 70–139)
GLUCOSE SERPL-MCNC: 180 MG/DL (ref 65–99)
HBV SURFACE AG SER QL: ABNORMAL
HCT VFR BLD AUTO: 38.1 % (ref 37–47)
HCV AB SER QL: ABNORMAL
HGB BLD-MCNC: 12.6 G/DL (ref 12–16)
HIV 1+2 AB+HIV1 P24 AG SERPL QL IA: NORMAL
MCH RBC QN AUTO: 33.3 PG (ref 27–33)
MCHC RBC AUTO-ENTMCNC: 33.1 G/DL (ref 32.2–35.5)
MCV RBC AUTO: 100.8 FL (ref 81.4–97.8)
PLATELET # BLD AUTO: 292 K/UL (ref 164–446)
PMV BLD AUTO: 10.1 FL (ref 9–12.9)
POTASSIUM SERPL-SCNC: 3.8 MMOL/L (ref 3.6–5.5)
PROT SERPL-MCNC: 7.1 G/DL (ref 6–8.2)
PROT UR-MCNC: 15 MG/DL (ref 0–15)
PROT/CREAT UR: 90 MG/G (ref 10–107)
RBC # BLD AUTO: 3.78 M/UL (ref 4.2–5.4)
RH BLD: NORMAL
RUBV AB SER QL: 167 IU/ML
SODIUM SERPL-SCNC: 135 MMOL/L (ref 135–145)
T PALLIDUM AB SER QL IA: ABNORMAL
WBC # BLD AUTO: 8.3 K/UL (ref 4.8–10.8)

## 2023-09-22 PROCEDURE — 80307 DRUG TEST PRSMV CHEM ANLYZR: CPT

## 2023-09-22 PROCEDURE — 86762 RUBELLA ANTIBODY: CPT

## 2023-09-22 PROCEDURE — 82570 ASSAY OF URINE CREATININE: CPT

## 2023-09-22 PROCEDURE — 85027 COMPLETE CBC AUTOMATED: CPT

## 2023-09-22 PROCEDURE — 87086 URINE CULTURE/COLONY COUNT: CPT

## 2023-09-22 PROCEDURE — 86850 RBC ANTIBODY SCREEN: CPT

## 2023-09-22 PROCEDURE — 82950 GLUCOSE TEST: CPT

## 2023-09-22 PROCEDURE — 87389 HIV-1 AG W/HIV-1&-2 AB AG IA: CPT

## 2023-09-22 PROCEDURE — 36415 COLL VENOUS BLD VENIPUNCTURE: CPT

## 2023-09-22 PROCEDURE — 80053 COMPREHEN METABOLIC PANEL: CPT

## 2023-09-22 PROCEDURE — 86900 BLOOD TYPING SEROLOGIC ABO: CPT

## 2023-09-22 PROCEDURE — 84156 ASSAY OF PROTEIN URINE: CPT

## 2023-09-22 PROCEDURE — 86803 HEPATITIS C AB TEST: CPT

## 2023-09-22 PROCEDURE — 86901 BLOOD TYPING SEROLOGIC RH(D): CPT

## 2023-09-22 PROCEDURE — 86780 TREPONEMA PALLIDUM: CPT

## 2023-09-22 PROCEDURE — 87340 HEPATITIS B SURFACE AG IA: CPT

## 2023-09-25 ENCOUNTER — TELEPHONE (OUTPATIENT)
Dept: OBGYN | Facility: CLINIC | Age: 28
End: 2023-09-25
Payer: MEDICAID

## 2023-09-25 DIAGNOSIS — R73.09 GLUCOSE TOLERANCE TEST ABNORMAL: ICD-10-CM

## 2023-09-25 NOTE — TELEPHONE ENCOUNTER
----- Message from Dai Campbell C.N.M. sent at 9/25/2023  7:57 AM PDT -----  Please contact the patient and let her know she has an elevated early 1 hour GTT. I've ordered the 3 hour GTT for her to complete ASAP. Please ensure she knows this is a fasting test.     Baseline pre-eclampsia labs WNL. PNL WNL. Consider hemoglobinopathy labs.    9/25/2023 1132   Avril ID: [id number]  Pt notified of abnormal 1hr gtt and need to do 3hr gtt this time. Pt instructed to fast 8-10 hrs prior to testing. Pt informed she is only allow to drink plain water during fasting time. Advised to bring a snack for after the test is done. Pt agreed to do it 9/28/2023. Pt agreed and verbalized understanding.

## 2023-09-28 ENCOUNTER — HOSPITAL ENCOUNTER (OUTPATIENT)
Dept: LAB | Facility: MEDICAL CENTER | Age: 28
End: 2023-09-28
Payer: MEDICAID

## 2023-09-28 DIAGNOSIS — R73.09 GLUCOSE TOLERANCE TEST ABNORMAL: ICD-10-CM

## 2023-09-28 LAB
GLUCOSE 1H P CHAL SERPL-MCNC: 209 MG/DL (ref 65–180)
GLUCOSE 2H P CHAL SERPL-MCNC: 167 MG/DL (ref 65–155)
GLUCOSE 3H P CHAL SERPL-MCNC: 72 MG/DL (ref 65–140)
GLUCOSE BS SERPL-MCNC: 95 MG/DL (ref 65–95)

## 2023-09-28 PROCEDURE — 82951 GLUCOSE TOLERANCE TEST (GTT): CPT

## 2023-09-28 PROCEDURE — 82952 GTT-ADDED SAMPLES: CPT

## 2023-09-28 PROCEDURE — 36415 COLL VENOUS BLD VENIPUNCTURE: CPT

## 2023-09-30 DIAGNOSIS — O24.419 GESTATIONAL DIABETES MELLITUS (GDM), ANTEPARTUM, GESTATIONAL DIABETES METHOD OF CONTROL UNSPECIFIED: ICD-10-CM

## 2023-09-30 PROBLEM — R73.09 GLUCOSE TOLERANCE TEST ABNORMAL: Status: RESOLVED | Noted: 2023-09-25 | Resolved: 2023-09-30

## 2023-09-30 PROCEDURE — RXMED WILLOW AMBULATORY MEDICATION CHARGE

## 2023-09-30 RX ORDER — LANCETS 30 GAUGE
EACH MISCELLANEOUS
Qty: 100 EACH | Refills: 3 | Status: SHIPPED | OUTPATIENT
Start: 2023-09-30

## 2023-09-30 RX ORDER — BLOOD-GLUCOSE METER
KIT MISCELLANEOUS
Qty: 1 KIT | Refills: 0 | Status: SHIPPED | OUTPATIENT
Start: 2023-09-30 | End: 2023-11-14

## 2023-09-30 RX ORDER — GLUCOSAMINE HCL/CHONDROITIN SU 500-400 MG
CAPSULE ORAL
Qty: 100 EACH | Refills: 3 | Status: SHIPPED | OUTPATIENT
Start: 2023-09-30 | End: 2023-11-14

## 2023-10-02 ENCOUNTER — TELEPHONE (OUTPATIENT)
Dept: OBGYN | Facility: CLINIC | Age: 28
End: 2023-10-02
Payer: MEDICAID

## 2023-10-02 ENCOUNTER — PHARMACY VISIT (OUTPATIENT)
Dept: PHARMACY | Facility: MEDICAL CENTER | Age: 28
End: 2023-10-02
Payer: COMMERCIAL

## 2023-10-02 NOTE — TELEPHONE ENCOUNTER
----- Message from Dai Campbell C.N.M. sent at 9/30/2023  1:05 AM PDT -----  Please call the patient and let her know that she failed her 3 hour GTT. She has gestational diabetes. Please get her set up with the nutrition class and scheduled with Dr. Reilly at diabetic clinic. I've ordered an A1C for her to complete ASAP as well as the GDM supplies. Thank you.    10/2/2023 0957   ID [id number].   Left message for pt to call back regarding lab results.     10/2/2023 1022   ID:[id number]    Pt notified of Dx of GDM and needs to go to GDM class and f/u at [facility] with MD. GDM class scheduled for 10/3/2023, explained class is 2hrs long and they need to bring glucose meter, test strips and lancets to the class and GDM f/u at [facility] on 10/12/2023 at 1:45. Address and phone to GDM class given to pt. Pt aware to bring in her log book and meter to GDM f/u with MD. Pt agreed and verbalized understanding.

## 2023-10-04 DIAGNOSIS — Z34.81 ENCOUNTER FOR SUPERVISION OF OTHER NORMAL PREGNANCY IN FIRST TRIMESTER: ICD-10-CM

## 2023-10-12 ENCOUNTER — ROUTINE PRENATAL (OUTPATIENT)
Dept: OBGYN | Facility: CLINIC | Age: 28
End: 2023-10-12
Payer: MEDICAID

## 2023-10-12 VITALS — DIASTOLIC BLOOD PRESSURE: 76 MMHG | SYSTOLIC BLOOD PRESSURE: 118 MMHG | WEIGHT: 156 LBS | BODY MASS INDEX: 26.79 KG/M2

## 2023-10-12 DIAGNOSIS — O24.419 GESTATIONAL DIABETES MELLITUS (GDM), ANTEPARTUM, GESTATIONAL DIABETES METHOD OF CONTROL UNSPECIFIED: ICD-10-CM

## 2023-10-12 DIAGNOSIS — O24.410 DIET CONTROLLED GESTATIONAL DIABETES MELLITUS (GDM) IN FIRST TRIMESTER: ICD-10-CM

## 2023-10-12 LAB — GLUCOSE BLD-MCNC: 94 MG/DL (ref 65–99)

## 2023-10-12 PROCEDURE — 3074F SYST BP LT 130 MM HG: CPT | Performed by: PHYSICIAN ASSISTANT

## 2023-10-12 PROCEDURE — 99214 OFFICE O/P EST MOD 30 MIN: CPT | Mod: 25 | Performed by: PHYSICIAN ASSISTANT

## 2023-10-12 PROCEDURE — 90471 IMMUNIZATION ADMIN: CPT | Performed by: PHYSICIAN ASSISTANT

## 2023-10-12 PROCEDURE — 90686 IIV4 VACC NO PRSV 0.5 ML IM: CPT | Performed by: PHYSICIAN ASSISTANT

## 2023-10-12 PROCEDURE — 3078F DIAST BP <80 MM HG: CPT | Performed by: PHYSICIAN ASSISTANT

## 2023-10-12 PROCEDURE — 82962 GLUCOSE BLOOD TEST: CPT | Performed by: PHYSICIAN ASSISTANT

## 2023-10-12 ASSESSMENT — FIBROSIS 4 INDEX: FIB4 SCORE: 0.21

## 2023-10-12 NOTE — PROGRESS NOTES
Liza Pearson is a 28 y.o. female  at 13w3d    Pregnancy complicated by:  Patient Active Problem List   Diagnosis    History of pre-eclampsia - start 81 mg ASA at 12 weeks    History of  delivery - desires TOLAC    History of GDMA2    Spontaneous     Prediabetes    Gestational diabetes mellitus         SUBJECTIVE:  Liza Pearson is a 28 y.o.,  at 13w3d who presents for pregnancy follow-up. Good fetal movement.  Denies VB, LOF, CTX.    Pt here for new GDM consult.   Diet: Pt has not attended Gestation Diabetic Education Class due to  issues but will plan on going next week. Has been working on diet and monitoring glucose, forgot log today but states fastings averaging 85-90 and post prandials 85-90.  Exercise: none   PMH: prediabes with A1c 5.9% 2023 per PCP at [facility].   OB Hx: GDMA2, pre-e  Family Hx: All grandparents on both sides with elevated BP    OBJECTIVE:  Vital Signs: /76   Wt 156 lb   LMP 07/10/2023   BMI 26.79 kg/m²   Fetal heart tones: 152/minute per bedside US  Abdomen: soft, non-tender, gravid, no rashes, fetal heart tones are present, fundal height is consistent with dates  Extremities: no edema      Med regimen:   - ASA 81mg, has not started yet     Baseline PIH labs WNL   A1c ordered but not done      Latest Reference Range & Units 23 12:28 23 08:17   Baseline Glucose 65 - 95 mg/dL  95   Glucose 1 Hour 65 - 180 mg/dL  209 (H)   Glucose, Post Dose 70 - 139 mg/dL 183 (H)    Glucose 2 Hour 65 - 155 mg/dL  167 (H)   Glucose 3 Hour 65 - 140 mg/dL  72         ASSESSMENT/PLAN:   - Intrauterine pregnancy of 13 weeks gestation, with normal growth.   - Diabetes Mellitus with normal sugar control.   - DM education class scheduled next week.    - Reviewed glucose readings.  Discussed maternal and fetal short and long term risks and prognosis of GDM including macrosomia,  hypoglycemia, shoulder dystocia, pregnancy  complications of preeclampsia and increase c/s rates, and future risk of developing T2DM.   - Get A1c drawn    - Anatomy US 11/21   - Start ASA 81mg     Follow up in 2 weeka.     Call or return for vaginal bleeding, regular contractions, leakage of fluid or decreased fetal movement. Educated on labor precautions.    Sho Alcantara P.A.-C.    I reviewed the case with Rick Reilly MD [facility] who consulted and agrees with the plan.

## 2023-10-12 NOTE — PROGRESS NOTES
Pt here for diabetic OB exam  Pt states no complaints   Good# [phone]  -FM  Pharmacy confirmed   Chaperone offered not indicated  Diabetic supplies: None needed today   Random Accucheck: 94  Pt has u/s scheduled on 11/21/23  Pt would like flu vaccine, consent signed   Pt forgot book.

## 2023-10-16 ENCOUNTER — TELEPHONE (OUTPATIENT)
Dept: OBGYN | Facility: CLINIC | Age: 28
End: 2023-10-16
Payer: MEDICAID

## 2023-10-16 NOTE — TELEPHONE ENCOUNTER
10/16/2023 0955   ID: [id number]  Left message for pt to confirm GDM class 10/17/2023 at 0830. Please bring all diabetic supplies. Call [phone] if you have any questions.

## 2023-11-07 ENCOUNTER — PHARMACY VISIT (OUTPATIENT)
Dept: PHARMACY | Facility: MEDICAL CENTER | Age: 28
End: 2023-11-07
Payer: COMMERCIAL

## 2023-11-07 PROCEDURE — RXMED WILLOW AMBULATORY MEDICATION CHARGE

## 2023-11-14 ENCOUNTER — OFFICE VISIT (OUTPATIENT)
Dept: URGENT CARE | Facility: CLINIC | Age: 28
End: 2023-11-14
Payer: MEDICAID

## 2023-11-14 VITALS
WEIGHT: 158.2 LBS | RESPIRATION RATE: 14 BRPM | BODY MASS INDEX: 24.83 KG/M2 | HEART RATE: 80 BPM | TEMPERATURE: 97 F | DIASTOLIC BLOOD PRESSURE: 82 MMHG | OXYGEN SATURATION: 98 % | SYSTOLIC BLOOD PRESSURE: 124 MMHG | HEIGHT: 67 IN

## 2023-11-14 DIAGNOSIS — J06.9 VIRAL URI: ICD-10-CM

## 2023-11-14 PROBLEM — R10.2 PELVIC PAIN: Status: ACTIVE | Noted: 2023-11-14

## 2023-11-14 PROBLEM — R79.89 ELEVATED LFTS: Status: ACTIVE | Noted: 2023-11-14

## 2023-11-14 PROBLEM — E78.5 HYPERLIPIDEMIA: Status: ACTIVE | Noted: 2023-11-14

## 2023-11-14 LAB
FLUAV RNA SPEC QL NAA+PROBE: NEGATIVE
FLUBV RNA SPEC QL NAA+PROBE: NEGATIVE
RSV RNA SPEC QL NAA+PROBE: NEGATIVE
S PYO DNA SPEC NAA+PROBE: NOT DETECTED
SARS-COV-2 RNA RESP QL NAA+PROBE: NEGATIVE

## 2023-11-14 PROCEDURE — 99213 OFFICE O/P EST LOW 20 MIN: CPT | Performed by: NURSE PRACTITIONER

## 2023-11-14 PROCEDURE — 3074F SYST BP LT 130 MM HG: CPT | Performed by: NURSE PRACTITIONER

## 2023-11-14 PROCEDURE — 87637 SARSCOV2&INF A&B&RSV AMP PRB: CPT | Mod: QW | Performed by: NURSE PRACTITIONER

## 2023-11-14 PROCEDURE — 3079F DIAST BP 80-89 MM HG: CPT | Performed by: NURSE PRACTITIONER

## 2023-11-14 PROCEDURE — 87651 STREP A DNA AMP PROBE: CPT | Performed by: NURSE PRACTITIONER

## 2023-11-14 ASSESSMENT — FIBROSIS 4 INDEX: FIB4 SCORE: 0.21

## 2023-11-14 NOTE — LETTER
November 14, 2023         Patient: Liza Pearson   YOB: 1995   Date of Visit: 11/14/2023           To Whom it May Concern:    Liza Pearson was seen in my clinic on 11/14/2023. She may be excused from work for the next three days due to illness.     If you have any questions or concerns, please don't hesitate to call.        Sincerely,           ISABEL Delcid.  Electronically Signed

## 2023-11-14 NOTE — PROGRESS NOTES
Chief Complaint   Patient presents with    Pharyngitis     X 4 days, sore throat, body ache, headache, congestion, runny nose       HISTORY OF PRESENT ILLNESS: Patient is a pleasant 28 y.o. female, who is pregnant, who presents today due to symptoms which started four days ago. Pt reports a dry cough, rhinitis, fatigue, malaise, and body aches. Denies chest pain, shortness of breath, or wheezing. Denies h/o asthma/copd/CAP.  Has tried OTC cold medications without significant relief of symptoms. No recent ABX use. No other aggravating or alleviating factors. Reports negative exposure to COVID-19. She is Mohawk speaking, a  is used for the entire visit.       Patient Active Problem List    Diagnosis Date Noted    Elevated LFTs 2023    Hyperlipidemia 2023    Pelvic pain 2023    Diet controlled gestational diabetes mellitus (GDM) in first trimester 2023    Prediabetes 2023    Spontaneous  2023    History of pre-eclampsia - start 81 mg ASA at 12 weeks 2022    History of  delivery - desires TOLAC 2022    History of GDMA2 2022       Allergies:Patient has no known allergies.    Current Outpatient Medications on File Prior to Visit   Medication Sig Dispense Refill    glucose blood strip Use one strip to test blood sugar four times per day (Patient not taking: Reported on 2023) 100 Strip 3    Lancets Use one lancet to test blood sugar four times per day. Product as per insurance coverage. (Patient not taking: Reported on 2023) 100 Each 3     No current facility-administered medications on file prior to visit.           Past Medical History:   Diagnosis Date    Diabetes (HCC)     Pre-Diabetic    Hyperlipidemia 2023       Social History     Tobacco Use    Smoking status: Never    Smokeless tobacco: Never   Vaping Use    Vaping Use: Never used   Substance Use Topics    Alcohol use: Never    Drug use: Never       Family  "Status   Relation Name Status    Mo  Alive    Fa  Alive    Sis 1 Alive    Bro 2 Alive   History reviewed. No pertinent family history.    ROS:  Review of Systems   Constitutional: Positive for fatigue, malaise. Negative for weight loss.  HENT: Positive for rhinitis, sore throat. Negative for ear pain, nosebleeds, and neck pain.    Eyes: Negative for vision changes.   Cardiovascular: Negative for chest pain, palpitations, orthopnea and leg swelling.   Respiratory: Positive for cough without sputum production. Negative for shortness of breath and wheezing.   Gastrointestinal: Negative for abdominal pain, vomiting or diarrhea.   Skin: Negative for rash, diaphoresis.     Exam:  /82 (BP Location: Left arm, Patient Position: Sitting, BP Cuff Size: Adult long)   Pulse 80   Temp 36.1 °C (97 °F) (Temporal)   Resp 14   Ht 1.702 m (5' 7\")   Wt 71.8 kg (158 lb 3.2 oz)   SpO2 98%   General: well-nourished, well-developed female in NAD  Head: normocephalic, atraumatic  Eyes: PERRLA, EOM within normal limits, no conjunctival injection, no scleral icterus, visual fields and acuity grossly intact.  Ears: normal shape and symmetry, no tenderness, no discharge. External canals are without any significant edema or erythema. Tympanic membranes are without any inflammation, no effusion. Gross auditory acuity is intact.  Nose: symmetrical without tenderness, mild discharge, erythema present bilateral nares.  Mouth/Throat: reasonable hygiene, no exudates or tonsillar enlargement. Mild erythema present.   Neck: no masses, range of motion within normal limits, no tracheal deviation.  Lymph: mild cervical adenopathy. No supraclavicular adenopathy.   Neuro: alert and oriented. Cranial nerves 1-12 grossly intact.   Cardiovascular: regular rate and rhythm without murmurs, rubs, or gallops. No edema.   Pulmonary: no distress. Chest is symmetrical with respiration. No wheezes, crackles, or rhonchi.   Musculoskeletal: appropriate muscle " tone, gait is stable.  Skin: warm, dry, intact, no clubbing, no cyanosis.   Psych: appropriate mood, affect, judgement.         Assessment/Plan:  1. Viral URI  POCT GROUP A STREP, PCR    POCT CoV-2, Flu A/B, RSV by PCR                Discussed symptoms most likely viral, self limiting illness. Did not see any evidence of a bacterial process. Discussed natural progression and sx care.  Rest, increase fluids, hand and respiratory hygiene. May take OTC Tylenol as directed for symptom relief. STRICT ER precautions.   Supportive care, differential diagnoses, and indications for immediate follow-up discussed with patient.   Pathogenesis of diagnosis discussed including typical length and natural progression.  Instructed to return to clinic or nearest emergency department for any change in condition, further concerns, or worsening of symptoms.  Patient states understanding of the plan of care and discharge instructions.          ISABEL Delcid.

## 2023-11-21 ENCOUNTER — APPOINTMENT (OUTPATIENT)
Dept: RADIOLOGY | Facility: IMAGING CENTER | Age: 28
End: 2023-11-21
Payer: MEDICAID

## 2023-11-21 DIAGNOSIS — Z34.81 ENCOUNTER FOR SUPERVISION OF OTHER NORMAL PREGNANCY IN FIRST TRIMESTER: ICD-10-CM

## 2023-11-21 PROCEDURE — 76805 OB US >/= 14 WKS SNGL FETUS: CPT | Mod: TC

## 2023-11-22 ENCOUNTER — ROUTINE PRENATAL (OUTPATIENT)
Dept: OBGYN | Facility: CLINIC | Age: 28
End: 2023-11-22
Payer: MEDICAID

## 2023-11-22 VITALS — WEIGHT: 158.4 LBS | DIASTOLIC BLOOD PRESSURE: 58 MMHG | BODY MASS INDEX: 24.81 KG/M2 | SYSTOLIC BLOOD PRESSURE: 122 MMHG

## 2023-11-22 DIAGNOSIS — O24.410 DIET CONTROLLED GESTATIONAL DIABETES MELLITUS (GDM) IN SECOND TRIMESTER: ICD-10-CM

## 2023-11-22 LAB — GLUCOSE BLD-MCNC: 101 MG/DL (ref 65–99)

## 2023-11-22 PROCEDURE — 3078F DIAST BP <80 MM HG: CPT | Performed by: OBSTETRICS & GYNECOLOGY

## 2023-11-22 PROCEDURE — 82962 GLUCOSE BLOOD TEST: CPT | Performed by: OBSTETRICS & GYNECOLOGY

## 2023-11-22 PROCEDURE — 3074F SYST BP LT 130 MM HG: CPT | Performed by: OBSTETRICS & GYNECOLOGY

## 2023-11-22 PROCEDURE — 0502F SUBSEQUENT PRENATAL CARE: CPT | Performed by: OBSTETRICS & GYNECOLOGY

## 2023-11-22 RX ORDER — FERROUS SULFATE 325(65) MG
325 TABLET ORAL DAILY
Status: ON HOLD | COMMUNITY
End: 2024-03-14

## 2023-11-22 ASSESSMENT — FIBROSIS 4 INDEX: FIB4 SCORE: 0.21

## 2023-11-22 NOTE — PROGRESS NOTES
Pt here today for OB follow up  Reports light FM  WT: 158.4 lb  BP: 122/58  Preferred pharmacy verified with pt.  MFM Appointment: with  today 11/22/2023  Pt states no complaints or concerns today  Good # [phone]    Random glucose check: 101 (40 minutes post white chocolate)

## 2023-11-22 NOTE — PROGRESS NOTES
Patient ID [id number]   Liza Pearson is a 28 y.o.  at 19w2d with a working estimated date of delivery of 4/15/2024, by Last Menstrual Period who presents for a routine prenatal visit. She denies vaginal bleeding, leakage of fluid, decreased fetal movements, or contractions.    Her pregnancy is complicated by:  Gestational Diabetes  Average fasting glucose: 95  Average 1 hour postprandial: 115    Objective     Physical Exam  Weight: 158 lb 6.4 oz  Expected Total Weight Gain: Could not be calculated   Pregravid BMI: Could not be calculated  Blood Pressure: 122/58       ABD: Soft, gravid non tender  FHR: 140      Assessment & Plan   Fasting BS are borderline. She has been snaking late before bedtime. We discussed cutting back on those.  US was c/w dates      Glucose Monitoring  -continue daily home glucose monitoring with the following targets:   Targets   Fasting <95   1 hr PP <130-140   2 hr PP <120   -symptoms of hypoglycemia and hyperglycemia reviewed  -call parameters discussed    Follow up in  2 weeks for a routine prenatal visit.    Khoi Fuentes MD

## 2023-12-05 ENCOUNTER — HOSPITAL ENCOUNTER (OUTPATIENT)
Dept: LAB | Facility: MEDICAL CENTER | Age: 28
End: 2023-12-05
Attending: EMERGENCY MEDICINE
Payer: MEDICAID

## 2023-12-05 DIAGNOSIS — O24.419 GESTATIONAL DIABETES MELLITUS (GDM), ANTEPARTUM, GESTATIONAL DIABETES METHOD OF CONTROL UNSPECIFIED: ICD-10-CM

## 2023-12-05 LAB
EST. AVERAGE GLUCOSE BLD GHB EST-MCNC: 105 MG/DL
HBA1C MFR BLD: 5.3 % (ref 4–5.6)

## 2023-12-05 PROCEDURE — 36415 COLL VENOUS BLD VENIPUNCTURE: CPT

## 2023-12-05 PROCEDURE — 83036 HEMOGLOBIN GLYCOSYLATED A1C: CPT

## 2023-12-07 ENCOUNTER — ROUTINE PRENATAL (OUTPATIENT)
Dept: OBGYN | Facility: CLINIC | Age: 28
End: 2023-12-07
Payer: MEDICAID

## 2023-12-07 VITALS — WEIGHT: 161.6 LBS | BODY MASS INDEX: 25.31 KG/M2 | DIASTOLIC BLOOD PRESSURE: 72 MMHG | SYSTOLIC BLOOD PRESSURE: 118 MMHG

## 2023-12-07 DIAGNOSIS — O24.410 DIET CONTROLLED GESTATIONAL DIABETES MELLITUS (GDM) IN SECOND TRIMESTER: ICD-10-CM

## 2023-12-07 LAB — GLUCOSE BLD-MCNC: 119 MG/DL (ref 65–99)

## 2023-12-07 PROCEDURE — 3078F DIAST BP <80 MM HG: CPT | Performed by: OBSTETRICS & GYNECOLOGY

## 2023-12-07 PROCEDURE — 3074F SYST BP LT 130 MM HG: CPT | Performed by: OBSTETRICS & GYNECOLOGY

## 2023-12-07 PROCEDURE — 82962 GLUCOSE BLOOD TEST: CPT | Performed by: OBSTETRICS & GYNECOLOGY

## 2023-12-07 PROCEDURE — 99212 OFFICE O/P EST SF 10 MIN: CPT | Performed by: OBSTETRICS & GYNECOLOGY

## 2023-12-07 RX ORDER — ASPIRIN 81 MG/1
81 TABLET ORAL DAILY
Status: ON HOLD | COMMUNITY
End: 2024-03-14

## 2023-12-07 ASSESSMENT — FIBROSIS 4 INDEX: FIB4 SCORE: 0.21

## 2023-12-07 NOTE — PROGRESS NOTES
SUBJECTIVE:  Liza is being seen today for her obstetrical visit.  She is 21 3/7 weeks gestation.  Her obstetrical history is significant for diabetes mellitus, gestational.     OBJECTIVE:  On examination today, fundal height is 21.    Fetal heart tones are at 140/minute.       Postprandial sugars in target range. Fastings are a bit elevated.    Recent ultrasonography performed on 11/21 is CWD.   Anatomy normal.    ASSESSMENT/PLAN:  1. Intrauterine pregnancy of 21 weeks gestation, with normal growth.  2. Diabetes Mellitus with normal sugar control except FBS, We may initiate NPH next week.  3. RTC 1 week.      Rick Reilly Jr., M.D.

## 2023-12-07 NOTE — PROGRESS NOTES
Pt here today for OB follow up  Reports +FM  WT: 161.6 lb   BP: 118/72  Preferred pharmacy verified with pt.  MFM Appointment: not at this time  Pt states no complaints or concerns today  Good # [phone]    Pt forgot log book today    Random glucose check: 119 (1 hour post breakfast, pt had 2 scrambled eggs and one cup milk)

## 2023-12-14 ENCOUNTER — ROUTINE PRENATAL (OUTPATIENT)
Dept: OBGYN | Facility: CLINIC | Age: 28
End: 2023-12-14
Payer: MEDICAID

## 2023-12-14 VITALS — BODY MASS INDEX: 25.22 KG/M2 | SYSTOLIC BLOOD PRESSURE: 114 MMHG | WEIGHT: 161 LBS | DIASTOLIC BLOOD PRESSURE: 72 MMHG

## 2023-12-14 DIAGNOSIS — O24.419 GESTATIONAL DIABETES MELLITUS (GDM) IN THIRD TRIMESTER, GESTATIONAL DIABETES METHOD OF CONTROL UNSPECIFIED: ICD-10-CM

## 2023-12-14 PROCEDURE — 99212 OFFICE O/P EST SF 10 MIN: CPT | Performed by: OBSTETRICS & GYNECOLOGY

## 2023-12-14 PROCEDURE — 3078F DIAST BP <80 MM HG: CPT | Performed by: OBSTETRICS & GYNECOLOGY

## 2023-12-14 PROCEDURE — 3074F SYST BP LT 130 MM HG: CPT | Performed by: OBSTETRICS & GYNECOLOGY

## 2023-12-14 ASSESSMENT — FIBROSIS 4 INDEX: FIB4 SCORE: 0.21

## 2023-12-14 NOTE — PROGRESS NOTES
OB F/U  PT REPORTS + FETAL MOVEMENT  DENIES VB, LOF, OR UC'S  PHONE # VERIFIED  PHARMACY VERIFIED

## 2023-12-14 NOTE — PROGRESS NOTES
SUBJECTIVE:  Liza is being seen today for her obstetrical visit.  She is 22 3/7 weeks gestation.  Her obstetrical history is significant for diabetes mellitus, gestational.     OBJECTIVE:  On examination today, fundal height is 21.    Fetal heart tones are at 150/minute.     Sugars are in target range.    ASSESSMENT/PLAN:  1. Intrauterine pregnancy of 22 weeks gestation, with normal growth.  2. Diabetes Mellitus with normal sugar control.  3. RTC 3 weeks.    Rick Reilly Jr., M.D.

## 2024-01-04 ENCOUNTER — ROUTINE PRENATAL (OUTPATIENT)
Dept: OBGYN | Facility: CLINIC | Age: 29
End: 2024-01-04
Payer: MEDICAID

## 2024-01-04 VITALS — WEIGHT: 164.4 LBS | BODY MASS INDEX: 25.75 KG/M2 | SYSTOLIC BLOOD PRESSURE: 126 MMHG | DIASTOLIC BLOOD PRESSURE: 68 MMHG

## 2024-01-04 DIAGNOSIS — O24.410 DIET CONTROLLED GESTATIONAL DIABETES MELLITUS (GDM) IN FIRST TRIMESTER: ICD-10-CM

## 2024-01-04 PROBLEM — Z86.32 HISTORY OF GESTATIONAL DIABETES MELLITUS (GDM): Status: RESOLVED | Noted: 2022-01-03 | Resolved: 2024-01-04

## 2024-01-04 LAB — GLUCOSE BLD-MCNC: 103 MG/DL (ref 65–99)

## 2024-01-04 PROCEDURE — 3074F SYST BP LT 130 MM HG: CPT | Performed by: PHYSICIAN ASSISTANT

## 2024-01-04 PROCEDURE — 99213 OFFICE O/P EST LOW 20 MIN: CPT | Performed by: PHYSICIAN ASSISTANT

## 2024-01-04 PROCEDURE — 3078F DIAST BP <80 MM HG: CPT | Performed by: PHYSICIAN ASSISTANT

## 2024-01-04 PROCEDURE — 82962 GLUCOSE BLOOD TEST: CPT | Performed by: PHYSICIAN ASSISTANT

## 2024-01-04 ASSESSMENT — FIBROSIS 4 INDEX: FIB4 SCORE: 0.21

## 2024-01-04 NOTE — PROGRESS NOTES
Pt here today for OB follow up  Reports small FM  WT: 164.4 lb   BP: 126/68  Preferred pharmacy verified with pt.  MFM Appointment: not at this time  Good # [phone]    Pt she occasionally feels a pinching sensation on her lower abdomen. states no other complaints or concerns today       Random glucose check: (1 hour post breakfast, pt had chicken fajitas with scrambled eggs and juice)

## 2024-01-04 NOTE — PROGRESS NOTES
Liza Pearson is a 28 y.o. female  at 25w3d    Pregnancy complicated by:  Patient Active Problem List   Diagnosis    History of pre-eclampsia - start 81 mg ASA at 12 weeks    History of  delivery - desires TOLAC    History of GDMA2    Spontaneous     Prediabetes    Diet controlled gestational diabetes mellitus (GDM) in first trimester    Elevated LFTs    Hyperlipidemia    Pelvic pain         SUBJECTIVE:  Liza Pearson is a 28 y.o.,  at 25w3d who presents for pregnancy follow-up. Good fetal movement.  Denies VB, LOF, CTX.    Pt in [facility] clinic today for GDM. Brings glucose log with somewhat elevated fastings. Admits high fastings are correlated with not always eat as snack before bed or eats high carbs for dinner.     OBJECTIVE:  Vital Signs: /68   Wt 164 lb 6.4 oz   LMP 07/10/2023   BMI 25.75 kg/m²   Fundal height: 25cm, which is S=D.   Fetal heart tones: 148/minute.   Abdomen: soft, non-tender, gravid, no rashes, fetal heart tones are present, fundal height is consistent with dates  Extremities: no edema     Average Range Targets   Fasting 98  <95   1 hr  115-135 <130     Med regimen:   Diet controlled     ASSESSMENT/PLAN:   - Intrauterine pregnancy of 25 weeks gestation, with normal growth.   - Diabetes Mellitus with fair sugar control. Reviewed importance of compliance with diet, especially PM snacks with pt. She will work on diet modifications and understands if readings are not better controlled at next visit she will need to start insulin.     - 3T labs ordered       Follow up in 2 week.     Call or return for vaginal bleeding, regular contractions, leakage of fluid or decreased fetal movement. Educated on labor precautions.    Sho Alcantara P.A.-C.    I reviewed the case with Rick Reilly MD [facility] who consulted and agrees with the plan.

## 2024-01-15 ENCOUNTER — HOSPITAL ENCOUNTER (OUTPATIENT)
Dept: LAB | Facility: MEDICAL CENTER | Age: 29
End: 2024-01-15
Attending: PHYSICIAN ASSISTANT
Payer: MEDICAID

## 2024-01-15 DIAGNOSIS — O24.410 DIET CONTROLLED GESTATIONAL DIABETES MELLITUS (GDM) IN FIRST TRIMESTER: ICD-10-CM

## 2024-01-15 LAB
ERYTHROCYTE [DISTWIDTH] IN BLOOD BY AUTOMATED COUNT: 46.2 FL (ref 35.9–50)
HCT VFR BLD AUTO: 38.2 % (ref 37–47)
HGB BLD-MCNC: 13 G/DL (ref 12–16)
MCH RBC QN AUTO: 34.3 PG (ref 27–33)
MCHC RBC AUTO-ENTMCNC: 34 G/DL (ref 32.2–35.5)
MCV RBC AUTO: 100.8 FL (ref 81.4–97.8)
PLATELET # BLD AUTO: 313 K/UL (ref 164–446)
PMV BLD AUTO: 10.3 FL (ref 9–12.9)
RBC # BLD AUTO: 3.79 M/UL (ref 4.2–5.4)
T PALLIDUM AB SER QL IA: NORMAL
WBC # BLD AUTO: 8.6 K/UL (ref 4.8–10.8)

## 2024-01-15 PROCEDURE — 86780 TREPONEMA PALLIDUM: CPT

## 2024-01-15 PROCEDURE — 36415 COLL VENOUS BLD VENIPUNCTURE: CPT

## 2024-01-15 PROCEDURE — 85027 COMPLETE CBC AUTOMATED: CPT

## 2024-01-18 ENCOUNTER — ROUTINE PRENATAL (OUTPATIENT)
Dept: OBGYN | Facility: CLINIC | Age: 29
End: 2024-01-18
Payer: MEDICAID

## 2024-01-18 VITALS — DIASTOLIC BLOOD PRESSURE: 70 MMHG | SYSTOLIC BLOOD PRESSURE: 116 MMHG | BODY MASS INDEX: 26 KG/M2 | WEIGHT: 166 LBS

## 2024-01-18 DIAGNOSIS — O24.410 DIET CONTROLLED GESTATIONAL DIABETES MELLITUS (GDM) IN FIRST TRIMESTER: ICD-10-CM

## 2024-01-18 DIAGNOSIS — O24.419 GESTATIONAL DIABETES MELLITUS (GDM) IN THIRD TRIMESTER, GESTATIONAL DIABETES METHOD OF CONTROL UNSPECIFIED: ICD-10-CM

## 2024-01-18 DIAGNOSIS — Z87.59 HISTORY OF PRE-ECLAMPSIA: ICD-10-CM

## 2024-01-18 DIAGNOSIS — Z98.891 HISTORY OF CESAREAN DELIVERY: ICD-10-CM

## 2024-01-18 PROCEDURE — 3074F SYST BP LT 130 MM HG: CPT | Performed by: STUDENT IN AN ORGANIZED HEALTH CARE EDUCATION/TRAINING PROGRAM

## 2024-01-18 PROCEDURE — 3078F DIAST BP <80 MM HG: CPT | Performed by: STUDENT IN AN ORGANIZED HEALTH CARE EDUCATION/TRAINING PROGRAM

## 2024-01-18 PROCEDURE — 0502F SUBSEQUENT PRENATAL CARE: CPT | Performed by: STUDENT IN AN ORGANIZED HEALTH CARE EDUCATION/TRAINING PROGRAM

## 2024-01-18 PROCEDURE — 90715 TDAP VACCINE 7 YRS/> IM: CPT | Performed by: STUDENT IN AN ORGANIZED HEALTH CARE EDUCATION/TRAINING PROGRAM

## 2024-01-18 PROCEDURE — 90471 IMMUNIZATION ADMIN: CPT | Performed by: STUDENT IN AN ORGANIZED HEALTH CARE EDUCATION/TRAINING PROGRAM

## 2024-01-18 ASSESSMENT — FIBROSIS 4 INDEX: FIB4 SCORE: 0.19

## 2024-01-18 NOTE — PROGRESS NOTES
Liza Pearson is a 28 y.o. female  at 27w3d    Pregnancy complicated by:  Patient Active Problem List   Diagnosis    History of pre-eclampsia - start 81 mg ASA at 12 weeks    History of  delivery - desires TOLAC    Spontaneous     Prediabetes    Diet controlled gestational diabetes mellitus (GDM) in first trimester    Elevated LFTs    Hyperlipidemia    Pelvic pain         SUBJECTIVE:  Liza Pearson is a 28 y.o.,  at 27w3d who presents for pregnancy follow-up. Good fetal movement.  Denies VB, LOF, CTX.    Pt in MFM clinic today for GDM. Brings her glucose logs, which appear to be well controlled. 3T labs WNL.     ** #[id number] was used for this visit**    OBJECTIVE:  Vital Signs: Wt 166 lb   LMP 07/10/2023   BMI 26.00 kg/m²   Fundal height: 28cm, which is S=D.   Fetal heart tones: 134/minute.   Abdomen: soft, non-tender, gravid, no rashes, fetal heart tones are present, fundal height is consistent with dates  Extremities: No edema     Average Range Targets   Fasting 90 88-96 <95   1 hr   <130     Med regimen:   Diet controlled  ASA 81 mg      - Labs:   Hospital Outpatient Visit on 01/15/2024   Component Date Value Ref Range Status    Syphilis, Treponemal Qual 01/15/2024 Non-Reactive  Non-Reactive Final    Comment: Result of Non-reactive indicates no serologic evidence of  infection with Treponema pallidum.  (Incubating or early  primary syphilis cannot be excluded).      WBC 01/15/2024 8.6  4.8 - 10.8 K/uL Final    RBC 01/15/2024 3.79 (L)  4.20 - 5.40 M/uL Final    Hemoglobin 01/15/2024 13.0  12.0 - 16.0 g/dL Final    Hematocrit 01/15/2024 38.2  37.0 - 47.0 % Final    MCV 01/15/2024 100.8 (H)  81.4 - 97.8 fL Final    MCH 01/15/2024 34.3 (H)  27.0 - 33.0 pg Final    MCHC 01/15/2024 34.0  32.2 - 35.5 g/dL Final    Please note new reference range effective 2023.    RDW 01/15/2024 46.2  35.9 - 50.0 fL Final    Platelet Count 01/15/2024 313   164 - 446 K/uL Final    MPV 01/15/2024 10.3  9.0 - 12.9 fL Final   Routine Prenatal on 01/04/2024   Component Date Value Ref Range Status    Glucose - Accu-Ck 01/04/2024 103 (A)  65 - 99 mg/dL Final    1  hr post breakfast      - Pertinent US: 11/21/2023: fetal lie: vertex; MINDI: 16.39 cm; cervical legnth: 4.01; EFW: 78.1%   - TDaP: administered 1/18/2024   - GBS: collect at 36 weeks   - BTL: declines   - Rh: positive    ASSESSMENT/PLAN:   - Intrauterine pregnancy of 27w3d weeks gestation, with normal growth.   - Diabetes Mellitus with normal sugar control. Continue to monitor your sugars and modify your diet. If readings start to increase and are not within range then we will need to discuss starting insulin.  - Tdap administered today  - continue ASA  - Pt wishes for TOLAC    Follow up in 2 week.     Call or return for vaginal bleeding, regular contractions, leakage of fluid or decreased fetal movement. Educated on labor precautions.    Chanel Johnson PDiliaA.-C.    I reviewed the case with Rick Reilly MD [facility] who consulted and agrees with the plan.

## 2024-01-18 NOTE — LETTER
Cuente los Movimientos de mares Bebé  Otro paso importante para la reshma de mares bebé    Liza Pearson     [facility]            Dept: [phone]    ¿Cuántas semanas tiene de embarazo? 27w3d    Fecha cuando tiene que comenzar a contar el movimiento: 1/18/2024                  [place] debe usar juan miguel diagrama    Jessica manera en que mares doctor puede controlar a reshma de mares bebé es sabiendo cuantas veces se mueve mares bebé en el útero, o por medio de las “pataditas”.  Usted podrá ayudarle a mares médico al usar cada día el siguiente diagrama.    Cada día, usted debe prestar atención a cuantas horas le lleva a mares bebé moverse 10 veces.  Comience a contar en la mañana, lo antes posible después de haberse levantado.    Primeramente, escriba la hora en que se mueve mares bebé, hasta llegar a 10 veces.  Colóquele un check o palomita a cada cuadrito cada vez que mares bebé se mueva hasta que complete 10 veces.  Escriba la hora cuando termine de contar 10 veces en la última columna.  Sume el total del tiempo que le llevó contar los 10 movimientos.  Finalmente, complete el cuadrito de cuantas horas le llevó hacerlo.    Después de tino contado los 10 movimientos, ya no tendrá que contar los demás movimientos por el shruti del día.  A la mañana siguiente, comience a contar de nuevo cuantas veces se mueve el bebé desde el momento en que se levante.    ¿Qué tendría que considerarse un “movimiento”?  Es difícil de decirlo porque es distinto de jessica madre a otra, y de un embarazo a otro.  Lo importante es que cuente el movimiento de la misma manera ludy el transcurso de mares embarazo.  Si tiene preguntas adicionales, pregúntele a mares doctor.    ¡Cuente cuidadosamente cada día!     MUESTRA:  Semana 28    ¿Cuántas horas le ha llevado sentir 10 movimientos?        Hora de Inicio     1     2     3     4     5     6     7     8     9     10   Hora de Finlizar   Anotny. 8:20           11:40   Mar.               Mié.                Jue.               Vie.               Sáb.               Dom.                 IMPORTANTE:  Usted debe contactar a mares doctor si le lleva más de 2 horas sentir 10 movimientos de mares bebé.    Cada mañana, escriba la hora de inicio y comience a contar los movimientos de mares bebé.  Hágalo colocándole un check o palomita a cada cuadrito cada vez que sienta un movimiento de mares bebé.  Cuando haya sentido 10 “pataditas”, escriba la hora en que terminó de contar en la última columna.  Luego, complete en la cajita (arriba de la anne de check o palomita) el número total de horas que le llevó hacerlo.  Asegúrese de leer completamente las instrucciones en la página anterior.

## 2024-01-18 NOTE — PROGRESS NOTES
Pt here for diabetic OB exam  Pt states she has been experiencing lower abdominal pain since yesterday. In addition pt states that she has notice varicose veins that appeared after she started taking aspirin  Good# [phone]  +  Pharmacy confirmed   Chaperone offered not indicated  Diabetic supplies: None needed today   Random Accucheck:NA  Pt given CHECO sheet and instructions.  Pt declines BTL  Pt would like TDAP, consent signed.

## 2024-02-01 ENCOUNTER — ROUTINE PRENATAL (OUTPATIENT)
Dept: OBGYN | Facility: CLINIC | Age: 29
End: 2024-02-01
Payer: MEDICAID

## 2024-02-01 ENCOUNTER — HOSPITAL ENCOUNTER (OUTPATIENT)
Facility: MEDICAL CENTER | Age: 29
End: 2024-02-01
Attending: STUDENT IN AN ORGANIZED HEALTH CARE EDUCATION/TRAINING PROGRAM
Payer: MEDICAID

## 2024-02-01 VITALS — SYSTOLIC BLOOD PRESSURE: 118 MMHG | BODY MASS INDEX: 26.41 KG/M2 | DIASTOLIC BLOOD PRESSURE: 70 MMHG | WEIGHT: 168.6 LBS

## 2024-02-01 DIAGNOSIS — O26.893 VAGINAL DISCHARGE DURING PREGNANCY IN THIRD TRIMESTER: ICD-10-CM

## 2024-02-01 DIAGNOSIS — O36.8130 DECREASED FETAL MOVEMENTS IN THIRD TRIMESTER, SINGLE OR UNSPECIFIED FETUS: ICD-10-CM

## 2024-02-01 DIAGNOSIS — N89.8 VAGINAL DISCHARGE DURING PREGNANCY IN THIRD TRIMESTER: ICD-10-CM

## 2024-02-01 DIAGNOSIS — O24.410 GDM, CLASS A1: ICD-10-CM

## 2024-02-01 LAB
CANDIDA DNA VAG QL PROBE+SIG AMP: NEGATIVE
G VAGINALIS DNA VAG QL PROBE+SIG AMP: NEGATIVE
GLUCOSE BLD-MCNC: 113 MG/DL (ref 65–99)
NST ACOUSTIC STIMULATION: NORMAL
NST ACTION NECESSARY: NORMAL
NST ASSESSMENT: NORMAL
NST BASELINE: NORMAL
NST INDICATIONS: NORMAL
NST OTHER DATA: NORMAL
NST READ BY: NORMAL
NST RETURN: NORMAL
NST UTERINE ACTIVITY: NORMAL
T VAGINALIS DNA VAG QL PROBE+SIG AMP: NEGATIVE

## 2024-02-01 PROCEDURE — 0502F SUBSEQUENT PRENATAL CARE: CPT | Performed by: STUDENT IN AN ORGANIZED HEALTH CARE EDUCATION/TRAINING PROGRAM

## 2024-02-01 PROCEDURE — 87510 GARDNER VAG DNA DIR PROBE: CPT

## 2024-02-01 PROCEDURE — 3078F DIAST BP <80 MM HG: CPT | Performed by: STUDENT IN AN ORGANIZED HEALTH CARE EDUCATION/TRAINING PROGRAM

## 2024-02-01 PROCEDURE — 59025 FETAL NON-STRESS TEST: CPT | Performed by: STUDENT IN AN ORGANIZED HEALTH CARE EDUCATION/TRAINING PROGRAM

## 2024-02-01 PROCEDURE — 3074F SYST BP LT 130 MM HG: CPT | Performed by: STUDENT IN AN ORGANIZED HEALTH CARE EDUCATION/TRAINING PROGRAM

## 2024-02-01 PROCEDURE — 87480 CANDIDA DNA DIR PROBE: CPT

## 2024-02-01 PROCEDURE — 82962 GLUCOSE BLOOD TEST: CPT | Performed by: STUDENT IN AN ORGANIZED HEALTH CARE EDUCATION/TRAINING PROGRAM

## 2024-02-01 PROCEDURE — 87660 TRICHOMONAS VAGIN DIR PROBE: CPT

## 2024-02-01 ASSESSMENT — FIBROSIS 4 INDEX: FIB4 SCORE: 0.19

## 2024-02-01 NOTE — PROGRESS NOTES
Pt here today for OB follow up  Reports decreased FM x 4 days  WT: 168.6 lb   BP: 118/70  Preferred pharmacy verified with pt.  MFM Appointment: not at this time  Pt states she had water like leakage las Friday 3 times in the am. States no more leakage since then. Pt reports some lower abdominal pain when she sits and stands up. Denies UC's or vaginal bleeding.   Good # [phone]    Random glucose check: 113 (2.5 hours post breakfast. Pt had scramble eggs with strawberry shake)

## 2024-02-01 NOTE — PROGRESS NOTES
"Liza Pearson is a 28 y.o. female  at 29w3d    Pregnancy complicated by:  Patient Active Problem List   Diagnosis    History of pre-eclampsia - start 81 mg ASA at 12 weeks    History of  delivery - desires TOLAC    Spontaneous     Prediabetes    Diet controlled gestational diabetes mellitus (GDM) in first trimester    Elevated LFTs    Hyperlipidemia    Pelvic pain         SUBJECTIVE:  Liza Pearson is a 28 y.o.,  at 29w3d who presents for pregnancy follow-up. Good fetal movement.  Denies VB, LOF, CTX.    Pt in MFM clinic today for GDM. Brings her sugar logs in today. States over the past 4 days she has noticed decreased fetal movement. It is taking her 2 hours to meet her kick count. Also, notes had three episodes of \"leaking\" last Friday. Has not had any additional leaking since. She states that it was clear. Denies it being thick like discharge. No vaginal itching or odor. No pelvic pain.    OBJECTIVE:  Vital Signs: /70   Wt 168 lb 9.6 oz   LMP 07/10/2023   BMI 26.41 kg/m²   Fundal height: 30cm, which is S=D.   Fetal heart tones: 137/minute.   Abdomen: soft, non-tender, gravid, no rashes, fetal heart tones are present, fundal height is consistent with dates  Pelvic: a sterile speculum exam was performed in the clinic. External OS closed. No pooling of fluid present. White thin discharge noted.   Extremities: No edema     Average Range Targets   Fasting 88 71-94 <95   1 hr  103-165 <130     Med regimen:   Diet controlled    - NST  Indication: decreased fetal movement  NST Interpretation: Category 1  Baseline 130, reactive, Variability  6-25 BPM, Accelerations: Yes, Decelerations: No      - Labs:   Routine Prenatal on 2024   Component Date Value Ref Range Status    Glucose - Accu-Ck 2024 113 (A)  65 - 99 mg/dL Final    2.5 hours post breakfast   Hospital Outpatient Visit on 01/15/2024   Component Date Value Ref Range Status    Syphilis, " Treponemal Qual 01/15/2024 Non-Reactive  Non-Reactive Final    Comment: Result of Non-reactive indicates no serologic evidence of  infection with Treponema pallidum.  (Incubating or early  primary syphilis cannot be excluded).      WBC 01/15/2024 8.6  4.8 - 10.8 K/uL Final    RBC 01/15/2024 3.79 (L)  4.20 - 5.40 M/uL Final    Hemoglobin 01/15/2024 13.0  12.0 - 16.0 g/dL Final    Hematocrit 01/15/2024 38.2  37.0 - 47.0 % Final    MCV 01/15/2024 100.8 (H)  81.4 - 97.8 fL Final    MCH 01/15/2024 34.3 (H)  27.0 - 33.0 pg Final    MCHC 01/15/2024 34.0  32.2 - 35.5 g/dL Final    Please note new reference range effective 05/22/2023.    RDW 01/15/2024 46.2  35.9 - 50.0 fL Final    Platelet Count 01/15/2024 313  164 - 446 K/uL Final    MPV 01/15/2024 10.3  9.0 - 12.9 fL Final   Routine Prenatal on 01/04/2024   Component Date Value Ref Range Status    Glucose - Accu-Ck 01/04/2024 103 (A)  65 - 99 mg/dL Final    1  hr post breakfast      - Pertinent US: 11/21/2023: fetal lie: vertex; MINDI: 16.39 cm; cervical legnth: 4.01; EFW: 78.1%    - TDaP: administered 1/18/2024   - RSV: Educated on RSV vaccine at 32-36w   - GBS: collect at 36 weeks   - BTL: declines   - Rh: positive      ASSESSMENT/PLAN:   - Intrauterine pregnancy of 29w3d gestation, with normal growth.   - Diabetes Mellitus with normal sugar control.   - NST: reactive   - pH here in the clinic was 5.    - vaginal pathogen collected      Follow up in 2 week.     Call or return for vaginal bleeding, regular contractions, leakage of fluid or decreased fetal movement. Educated on labor precautions.    Chanel Johnson PDiliaA.-CDilia    I reviewed the case with Rick Reilly MD [facility] who consulted and agrees with the plan.

## 2024-02-15 ENCOUNTER — ROUTINE PRENATAL (OUTPATIENT)
Dept: OBGYN | Facility: CLINIC | Age: 29
End: 2024-02-15
Payer: MEDICAID

## 2024-02-15 VITALS — WEIGHT: 173 LBS | BODY MASS INDEX: 27.1 KG/M2 | DIASTOLIC BLOOD PRESSURE: 70 MMHG | SYSTOLIC BLOOD PRESSURE: 122 MMHG

## 2024-02-15 DIAGNOSIS — O24.419 GESTATIONAL DIABETES MELLITUS (GDM) IN THIRD TRIMESTER, GESTATIONAL DIABETES METHOD OF CONTROL UNSPECIFIED: ICD-10-CM

## 2024-02-15 PROCEDURE — 3078F DIAST BP <80 MM HG: CPT | Performed by: OBSTETRICS & GYNECOLOGY

## 2024-02-15 PROCEDURE — 99212 OFFICE O/P EST SF 10 MIN: CPT | Performed by: OBSTETRICS & GYNECOLOGY

## 2024-02-15 PROCEDURE — 3074F SYST BP LT 130 MM HG: CPT | Performed by: OBSTETRICS & GYNECOLOGY

## 2024-02-15 ASSESSMENT — FIBROSIS 4 INDEX: FIB4 SCORE: 0.19

## 2024-02-15 NOTE — PROGRESS NOTES
SUBJECTIVE:  Liza is being seen today for her obstetrical visit.  She is 31 3/7 weeks gestation.  Her obstetrical history is significant for diabetes mellitus, gestational.     OBJECTIVE:  On examination today, fundal height is 31.    Fetal heart tones are at 140/minute.       Sugars in target range.    ASSESSMENT/PLAN:  1. Intrauterine pregnancy of 31 weeks gestation, with normal growth.  2. Diabetes Mellitus with normal sugar control.    3.  RTC 2 weeks.    Rick Reilly Jr., M.D.

## 2024-02-15 NOTE — PROGRESS NOTES
Pt here for diabetic OB exam  Pt states she is experiencing lower abdominal pain, in addition she sometimes if getting numbness in her hands  Good# [phone]  +FM  Pharmacy confirmed   Chaperone offered not indicated  Diabetic supplies: None needed today   Random Accucheck: NA

## 2024-02-29 ENCOUNTER — ROUTINE PRENATAL (OUTPATIENT)
Dept: OBGYN | Facility: CLINIC | Age: 29
End: 2024-02-29
Payer: MEDICAID

## 2024-02-29 ENCOUNTER — APPOINTMENT (OUTPATIENT)
Dept: RADIOLOGY | Facility: IMAGING CENTER | Age: 29
End: 2024-02-29
Attending: STUDENT IN AN ORGANIZED HEALTH CARE EDUCATION/TRAINING PROGRAM
Payer: MEDICAID

## 2024-02-29 VITALS — DIASTOLIC BLOOD PRESSURE: 70 MMHG | WEIGHT: 179 LBS | SYSTOLIC BLOOD PRESSURE: 112 MMHG | BODY MASS INDEX: 28.04 KG/M2

## 2024-02-29 DIAGNOSIS — O24.419 GESTATIONAL DIABETES MELLITUS (GDM) IN THIRD TRIMESTER, GESTATIONAL DIABETES METHOD OF CONTROL UNSPECIFIED: ICD-10-CM

## 2024-02-29 PROCEDURE — 76816 OB US FOLLOW-UP PER FETUS: CPT | Mod: TC | Performed by: STUDENT IN AN ORGANIZED HEALTH CARE EDUCATION/TRAINING PROGRAM

## 2024-02-29 PROCEDURE — 3074F SYST BP LT 130 MM HG: CPT | Performed by: OBSTETRICS & GYNECOLOGY

## 2024-02-29 PROCEDURE — 3078F DIAST BP <80 MM HG: CPT | Performed by: OBSTETRICS & GYNECOLOGY

## 2024-02-29 PROCEDURE — 0502F SUBSEQUENT PRENATAL CARE: CPT | Performed by: OBSTETRICS & GYNECOLOGY

## 2024-02-29 PROCEDURE — 59025 FETAL NON-STRESS TEST: CPT | Performed by: OBSTETRICS & GYNECOLOGY

## 2024-02-29 ASSESSMENT — FIBROSIS 4 INDEX: FIB4 SCORE: 0.2

## 2024-02-29 NOTE — PROGRESS NOTES
GDM Class  A1. OB F/U.  + fetal movement  Denies any VB, LO or UC's  Phone # [phone] (home)   Pharmacy confirmed  Diabetic supplies: has refills

## 2024-02-29 NOTE — PROGRESS NOTES
No chief complaint on file.    Patient ID [id number]   Liza Pearson is a 29 y.o.  at 33w3d with a working estimated date of delivery of 4/15/2024, by Last Menstrual Period who presents for a routine prenatal visit. She denies vaginal bleeding, leakage of fluid, decreased fetal movements, or contractions.    Her pregnancy is complicated by:  Gestational Diabetes-Diet Controlled  Average fasting glucose: 80  Average 1 hour postprandial: 128    Objective     Physical Exam  Weight: 179 lb  Expected Total Weight Gain: Could not be calculated   Pregravid BMI: Could not be calculated  Blood Pressure: 112/70       ABD: Soft, Gravid, Non tender  FHR: 150    NST-Reactive  Baseline-150  Accelerations-present  Deselerations-None    Assessment & Plan   33w3d IUP-Has not had a sono since 20 weeks. Will check U/S today  Gestational Diabetes-Good BS, no change needed    Glucose Monitoring  -continue daily home glucose monitoring with the following targets:   Targets   Fasting <95   1 hr PP <130-140   2 hr PP <120   -symptoms of hypoglycemia and hyperglycemia reviewed  -call parameters discussed    Follow up in  1 week for a routine prenatal visit.    Khoi Fuentes MD

## 2024-03-10 ENCOUNTER — HOSPITAL ENCOUNTER (INPATIENT)
Facility: MEDICAL CENTER | Age: 29
LOS: 3 days | End: 2024-03-14
Attending: STUDENT IN AN ORGANIZED HEALTH CARE EDUCATION/TRAINING PROGRAM | Admitting: STUDENT IN AN ORGANIZED HEALTH CARE EDUCATION/TRAINING PROGRAM
Payer: MEDICAID

## 2024-03-10 PROCEDURE — 302449 STATCHG TRIAGE ONLY (STATISTIC)

## 2024-03-10 ASSESSMENT — FIBROSIS 4 INDEX: FIB4 SCORE: 0.2

## 2024-03-11 ENCOUNTER — ANESTHESIA (OUTPATIENT)
Dept: OBGYN | Facility: MEDICAL CENTER | Age: 29
End: 2024-03-11
Payer: MEDICAID

## 2024-03-11 ENCOUNTER — ANESTHESIA EVENT (OUTPATIENT)
Dept: OBGYN | Facility: MEDICAL CENTER | Age: 29
End: 2024-03-11
Payer: MEDICAID

## 2024-03-11 PROBLEM — O14.13 SEVERE PRE-ECLAMPSIA IN THIRD TRIMESTER: Status: ACTIVE | Noted: 2024-03-11

## 2024-03-11 LAB
ABO GROUP BLD: NORMAL
ALT SERPL-CCNC: 9 U/L (ref 2–50)
AST SERPL-CCNC: 15 U/L (ref 12–45)
BASOPHILS # BLD AUTO: 0.5 % (ref 0–1.8)
BASOPHILS # BLD AUTO: 0.5 % (ref 0–1.8)
BASOPHILS # BLD: 0.04 K/UL (ref 0–0.12)
BASOPHILS # BLD: 0.04 K/UL (ref 0–0.12)
BLD GP AB SCN SERPL QL: NORMAL
BUN SERPL-MCNC: 11 MG/DL (ref 8–22)
CREAT SERPL-MCNC: 0.44 MG/DL (ref 0.5–1.4)
CREAT UR-MCNC: 27.45 MG/DL
EOSINOPHIL # BLD AUTO: 0.07 K/UL (ref 0–0.51)
EOSINOPHIL # BLD AUTO: 0.1 K/UL (ref 0–0.51)
EOSINOPHIL NFR BLD: 0.8 % (ref 0–6.9)
EOSINOPHIL NFR BLD: 1.2 % (ref 0–6.9)
ERYTHROCYTE [DISTWIDTH] IN BLOOD BY AUTOMATED COUNT: 44.3 FL (ref 35.9–50)
ERYTHROCYTE [DISTWIDTH] IN BLOOD BY AUTOMATED COUNT: 44.8 FL (ref 35.9–50)
ERYTHROCYTE [DISTWIDTH] IN BLOOD BY AUTOMATED COUNT: 45.1 FL (ref 35.9–50)
ERYTHROCYTE [DISTWIDTH] IN BLOOD BY AUTOMATED COUNT: 47 FL (ref 35.9–50)
GFR SERPLBLD CREATININE-BSD FMLA CKD-EPI: 134 ML/MIN/1.73 M 2
HCT VFR BLD AUTO: 32.8 % (ref 37–47)
HCT VFR BLD AUTO: 36.6 % (ref 37–47)
HCT VFR BLD AUTO: 36.7 % (ref 37–47)
HCT VFR BLD AUTO: 37 % (ref 37–47)
HGB BLD-MCNC: 11.2 G/DL (ref 12–16)
HGB BLD-MCNC: 12.7 G/DL (ref 12–16)
HGB BLD-MCNC: 12.7 G/DL (ref 12–16)
HGB BLD-MCNC: 13.1 G/DL (ref 12–16)
HOLDING TUBE BB 8507: NORMAL
IMM GRANULOCYTES # BLD AUTO: 0.02 K/UL (ref 0–0.11)
IMM GRANULOCYTES # BLD AUTO: 0.04 K/UL (ref 0–0.11)
IMM GRANULOCYTES NFR BLD AUTO: 0.2 % (ref 0–0.9)
IMM GRANULOCYTES NFR BLD AUTO: 0.5 % (ref 0–0.9)
LYMPHOCYTES # BLD AUTO: 1.94 K/UL (ref 1–4.8)
LYMPHOCYTES # BLD AUTO: 1.97 K/UL (ref 1–4.8)
LYMPHOCYTES NFR BLD: 22.1 % (ref 22–41)
LYMPHOCYTES NFR BLD: 23.9 % (ref 22–41)
MAGNESIUM SERPL-MCNC: 1.9 MG/DL (ref 1.5–2.5)
MAGNESIUM SERPL-MCNC: 3.7 MG/DL (ref 1.5–2.5)
MAGNESIUM SERPL-MCNC: 4.6 MG/DL (ref 1.5–2.5)
MAGNESIUM SERPL-MCNC: 5.8 MG/DL (ref 1.5–2.5)
MCH RBC QN AUTO: 34 PG (ref 27–33)
MCH RBC QN AUTO: 34.2 PG (ref 27–33)
MCH RBC QN AUTO: 34.8 PG (ref 27–33)
MCH RBC QN AUTO: 34.8 PG (ref 27–33)
MCHC RBC AUTO-ENTMCNC: 34.1 G/DL (ref 32.2–35.5)
MCHC RBC AUTO-ENTMCNC: 34.3 G/DL (ref 32.2–35.5)
MCHC RBC AUTO-ENTMCNC: 34.7 G/DL (ref 32.2–35.5)
MCHC RBC AUTO-ENTMCNC: 35.7 G/DL (ref 32.2–35.5)
MCV RBC AUTO: 101.9 FL (ref 81.4–97.8)
MCV RBC AUTO: 97.6 FL (ref 81.4–97.8)
MCV RBC AUTO: 98.7 FL (ref 81.4–97.8)
MCV RBC AUTO: 98.9 FL (ref 81.4–97.8)
MONOCYTES # BLD AUTO: 0.56 K/UL (ref 0–0.85)
MONOCYTES # BLD AUTO: 0.58 K/UL (ref 0–0.85)
MONOCYTES NFR BLD AUTO: 6.6 % (ref 0–13.4)
MONOCYTES NFR BLD AUTO: 6.8 % (ref 0–13.4)
NEUTROPHILS # BLD AUTO: 5.54 K/UL (ref 1.82–7.42)
NEUTROPHILS # BLD AUTO: 6.09 K/UL (ref 1.82–7.42)
NEUTROPHILS NFR BLD: 67.4 % (ref 44–72)
NEUTROPHILS NFR BLD: 69.5 % (ref 44–72)
NRBC # BLD AUTO: 0 K/UL
NRBC # BLD AUTO: 0 K/UL
NRBC BLD-RTO: 0 /100 WBC (ref 0–0.2)
NRBC BLD-RTO: 0 /100 WBC (ref 0–0.2)
PATHOLOGY CONSULT NOTE: NORMAL
PLATELET # BLD AUTO: 219 K/UL (ref 164–446)
PLATELET # BLD AUTO: 220 K/UL (ref 164–446)
PLATELET # BLD AUTO: 221 K/UL (ref 164–446)
PLATELET # BLD AUTO: 226 K/UL (ref 164–446)
PMV BLD AUTO: 11.2 FL (ref 9–12.9)
PMV BLD AUTO: 11.4 FL (ref 9–12.9)
PMV BLD AUTO: 11.5 FL (ref 9–12.9)
PMV BLD AUTO: 11.6 FL (ref 9–12.9)
PROT UR-MCNC: 6 MG/DL (ref 0–15)
PROT/CREAT UR: 219 MG/G (ref 10–107)
RBC # BLD AUTO: 3.22 M/UL (ref 4.2–5.4)
RBC # BLD AUTO: 3.71 M/UL (ref 4.2–5.4)
RBC # BLD AUTO: 3.74 M/UL (ref 4.2–5.4)
RBC # BLD AUTO: 3.76 M/UL (ref 4.2–5.4)
RH BLD: NORMAL
T PALLIDUM AB SER QL IA: NORMAL
T PALLIDUM AB SER QL IA: NORMAL
WBC # BLD AUTO: 16.4 K/UL (ref 4.8–10.8)
WBC # BLD AUTO: 7.6 K/UL (ref 4.8–10.8)
WBC # BLD AUTO: 8.2 K/UL (ref 4.8–10.8)
WBC # BLD AUTO: 8.8 K/UL (ref 4.8–10.8)

## 2024-03-11 PROCEDURE — 700111 HCHG RX REV CODE 636 W/ 250 OVERRIDE (IP): Performed by: STUDENT IN AN ORGANIZED HEALTH CARE EDUCATION/TRAINING PROGRAM

## 2024-03-11 PROCEDURE — 160048 HCHG OR STATISTICAL LEVEL 1-5: Performed by: STUDENT IN AN ORGANIZED HEALTH CARE EDUCATION/TRAINING PROGRAM

## 2024-03-11 PROCEDURE — 82570 ASSAY OF URINE CREATININE: CPT

## 2024-03-11 PROCEDURE — 700101 HCHG RX REV CODE 250: Performed by: ANESTHESIOLOGY

## 2024-03-11 PROCEDURE — C1755 CATHETER, INTRASPINAL: HCPCS | Performed by: STUDENT IN AN ORGANIZED HEALTH CARE EDUCATION/TRAINING PROGRAM

## 2024-03-11 PROCEDURE — 85027 COMPLETE CBC AUTOMATED: CPT | Mod: 91

## 2024-03-11 PROCEDURE — 700111 HCHG RX REV CODE 636 W/ 250 OVERRIDE (IP): Performed by: ANESTHESIOLOGY

## 2024-03-11 PROCEDURE — 700102 HCHG RX REV CODE 250 W/ 637 OVERRIDE(OP): Performed by: NURSE PRACTITIONER

## 2024-03-11 PROCEDURE — 83735 ASSAY OF MAGNESIUM: CPT | Mod: 91

## 2024-03-11 PROCEDURE — 160041 HCHG SURGERY MINUTES - EA ADDL 1 MIN LEVEL 4: Performed by: STUDENT IN AN ORGANIZED HEALTH CARE EDUCATION/TRAINING PROGRAM

## 2024-03-11 PROCEDURE — 86850 RBC ANTIBODY SCREEN: CPT

## 2024-03-11 PROCEDURE — 82565 ASSAY OF CREATININE: CPT

## 2024-03-11 PROCEDURE — 86900 BLOOD TYPING SEROLOGIC ABO: CPT

## 2024-03-11 PROCEDURE — 700102 HCHG RX REV CODE 250 W/ 637 OVERRIDE(OP): Performed by: ANESTHESIOLOGY

## 2024-03-11 PROCEDURE — 36415 COLL VENOUS BLD VENIPUNCTURE: CPT

## 2024-03-11 PROCEDURE — 160035 HCHG PACU - 1ST 60 MINS PHASE I: Performed by: STUDENT IN AN ORGANIZED HEALTH CARE EDUCATION/TRAINING PROGRAM

## 2024-03-11 PROCEDURE — 160029 HCHG SURGERY MINUTES - 1ST 30 MINS LEVEL 4: Performed by: STUDENT IN AN ORGANIZED HEALTH CARE EDUCATION/TRAINING PROGRAM

## 2024-03-11 PROCEDURE — 84460 ALANINE AMINO (ALT) (SGPT): CPT

## 2024-03-11 PROCEDURE — 80053 COMPREHEN METABOLIC PANEL: CPT

## 2024-03-11 PROCEDURE — 88307 TISSUE EXAM BY PATHOLOGIST: CPT

## 2024-03-11 PROCEDURE — A9270 NON-COVERED ITEM OR SERVICE: HCPCS | Performed by: NURSE PRACTITIONER

## 2024-03-11 PROCEDURE — 160009 HCHG ANES TIME/MIN: Performed by: STUDENT IN AN ORGANIZED HEALTH CARE EDUCATION/TRAINING PROGRAM

## 2024-03-11 PROCEDURE — 700105 HCHG RX REV CODE 258: Performed by: ANESTHESIOLOGY

## 2024-03-11 PROCEDURE — 85025 COMPLETE CBC W/AUTO DIFF WBC: CPT | Mod: 91

## 2024-03-11 PROCEDURE — 84450 TRANSFERASE (AST) (SGOT): CPT

## 2024-03-11 PROCEDURE — 86780 TREPONEMA PALLIDUM: CPT | Mod: 91

## 2024-03-11 PROCEDURE — 160002 HCHG RECOVERY MINUTES (STAT): Performed by: STUDENT IN AN ORGANIZED HEALTH CARE EDUCATION/TRAINING PROGRAM

## 2024-03-11 PROCEDURE — 84156 ASSAY OF PROTEIN URINE: CPT

## 2024-03-11 PROCEDURE — 770002 HCHG ROOM/CARE - OB PRIVATE (112)

## 2024-03-11 PROCEDURE — 700111 HCHG RX REV CODE 636 W/ 250 OVERRIDE (IP): Mod: JZ | Performed by: ANESTHESIOLOGY

## 2024-03-11 PROCEDURE — 700105 HCHG RX REV CODE 258: Performed by: NURSE PRACTITIONER

## 2024-03-11 PROCEDURE — A9270 NON-COVERED ITEM OR SERVICE: HCPCS | Performed by: ANESTHESIOLOGY

## 2024-03-11 PROCEDURE — 84520 ASSAY OF UREA NITROGEN: CPT

## 2024-03-11 PROCEDURE — 700111 HCHG RX REV CODE 636 W/ 250 OVERRIDE (IP): Performed by: NURSE PRACTITIONER

## 2024-03-11 PROCEDURE — 700111 HCHG RX REV CODE 636 W/ 250 OVERRIDE (IP)

## 2024-03-11 PROCEDURE — 86901 BLOOD TYPING SEROLOGIC RH(D): CPT

## 2024-03-11 RX ORDER — HYDRALAZINE HYDROCHLORIDE 20 MG/ML
5-10 INJECTION INTRAMUSCULAR; INTRAVENOUS
Status: DISCONTINUED | OUTPATIENT
Start: 2024-03-11 | End: 2024-03-11 | Stop reason: HOSPADM

## 2024-03-11 RX ORDER — MIDAZOLAM HYDROCHLORIDE 1 MG/ML
1 INJECTION INTRAMUSCULAR; INTRAVENOUS
Status: DISCONTINUED | OUTPATIENT
Start: 2024-03-11 | End: 2024-03-11 | Stop reason: HOSPADM

## 2024-03-11 RX ORDER — IPRATROPIUM BROMIDE AND ALBUTEROL SULFATE 2.5; .5 MG/3ML; MG/3ML
3 SOLUTION RESPIRATORY (INHALATION)
Status: DISCONTINUED | OUTPATIENT
Start: 2024-03-11 | End: 2024-03-11 | Stop reason: HOSPADM

## 2024-03-11 RX ORDER — IBUPROFEN 800 MG/1
800 TABLET ORAL
Status: DISCONTINUED | OUTPATIENT
Start: 2024-03-11 | End: 2024-03-11 | Stop reason: HOSPADM

## 2024-03-11 RX ORDER — OXYCODONE HYDROCHLORIDE 5 MG/1
10 TABLET ORAL EVERY 4 HOURS PRN
Status: ACTIVE | OUTPATIENT
Start: 2024-03-11 | End: 2024-03-12

## 2024-03-11 RX ORDER — SODIUM CHLORIDE, SODIUM LACTATE, POTASSIUM CHLORIDE, CALCIUM CHLORIDE 600; 310; 30; 20 MG/100ML; MG/100ML; MG/100ML; MG/100ML
INJECTION, SOLUTION INTRAVENOUS CONTINUOUS
Status: DISCONTINUED | OUTPATIENT
Start: 2024-03-11 | End: 2024-03-13

## 2024-03-11 RX ORDER — MAGNESIUM SULFATE HEPTAHYDRATE 40 MG/ML
2 INJECTION, SOLUTION INTRAVENOUS CONTINUOUS
Status: DISCONTINUED | OUTPATIENT
Start: 2024-03-11 | End: 2024-03-13

## 2024-03-11 RX ORDER — HYDROMORPHONE HYDROCHLORIDE 1 MG/ML
0.1 INJECTION, SOLUTION INTRAMUSCULAR; INTRAVENOUS; SUBCUTANEOUS
Status: DISCONTINUED | OUTPATIENT
Start: 2024-03-11 | End: 2024-03-11 | Stop reason: HOSPADM

## 2024-03-11 RX ORDER — TERBUTALINE SULFATE 1 MG/ML
0.25 INJECTION, SOLUTION SUBCUTANEOUS
Status: DISCONTINUED | OUTPATIENT
Start: 2024-03-11 | End: 2024-03-11 | Stop reason: HOSPADM

## 2024-03-11 RX ORDER — HYDROMORPHONE HYDROCHLORIDE 1 MG/ML
0.2 INJECTION, SOLUTION INTRAMUSCULAR; INTRAVENOUS; SUBCUTANEOUS
Status: ACTIVE | OUTPATIENT
Start: 2024-03-11 | End: 2024-03-12

## 2024-03-11 RX ORDER — LABETALOL HYDROCHLORIDE 5 MG/ML
20-80 INJECTION, SOLUTION INTRAVENOUS
Status: DISCONTINUED | OUTPATIENT
Start: 2024-03-11 | End: 2024-03-11 | Stop reason: HOSPADM

## 2024-03-11 RX ORDER — SODIUM CHLORIDE, SODIUM LACTATE, POTASSIUM CHLORIDE, CALCIUM CHLORIDE 600; 310; 30; 20 MG/100ML; MG/100ML; MG/100ML; MG/100ML
INJECTION, SOLUTION INTRAVENOUS PRN
Status: DISCONTINUED | OUTPATIENT
Start: 2024-03-11 | End: 2024-03-14 | Stop reason: HOSPADM

## 2024-03-11 RX ORDER — OXYTOCIN 10 [USP'U]/ML
INJECTION, SOLUTION INTRAMUSCULAR; INTRAVENOUS PRN
Status: DISCONTINUED | OUTPATIENT
Start: 2024-03-11 | End: 2024-03-11 | Stop reason: SURG

## 2024-03-11 RX ORDER — NIFEDIPINE 10 MG/1
10 CAPSULE ORAL
Status: DISCONTINUED | OUTPATIENT
Start: 2024-03-11 | End: 2024-03-11 | Stop reason: HOSPADM

## 2024-03-11 RX ORDER — ONDANSETRON 2 MG/ML
INJECTION INTRAMUSCULAR; INTRAVENOUS PRN
Status: DISCONTINUED | OUTPATIENT
Start: 2024-03-11 | End: 2024-03-11 | Stop reason: SURG

## 2024-03-11 RX ORDER — LABETALOL HYDROCHLORIDE 5 MG/ML
5 INJECTION, SOLUTION INTRAVENOUS
Status: DISCONTINUED | OUTPATIENT
Start: 2024-03-11 | End: 2024-03-11 | Stop reason: HOSPADM

## 2024-03-11 RX ORDER — MAGNESIUM SULFATE HEPTAHYDRATE 40 MG/ML
INJECTION, SOLUTION INTRAVENOUS
Status: COMPLETED
Start: 2024-03-11 | End: 2024-03-11

## 2024-03-11 RX ORDER — SODIUM CHLORIDE, SODIUM GLUCONATE, SODIUM ACETATE, POTASSIUM CHLORIDE AND MAGNESIUM CHLORIDE 526; 502; 368; 37; 30 MG/100ML; MG/100ML; MG/100ML; MG/100ML; MG/100ML
1500 INJECTION, SOLUTION INTRAVENOUS ONCE
Status: COMPLETED | OUTPATIENT
Start: 2024-03-11 | End: 2024-03-11

## 2024-03-11 RX ORDER — DIPHENHYDRAMINE HCL 25 MG
25 TABLET ORAL EVERY 6 HOURS PRN
Status: DISCONTINUED | OUTPATIENT
Start: 2024-03-12 | End: 2024-03-14 | Stop reason: HOSPADM

## 2024-03-11 RX ORDER — METOCLOPRAMIDE HYDROCHLORIDE 5 MG/ML
10 INJECTION INTRAMUSCULAR; INTRAVENOUS ONCE
Status: COMPLETED | OUTPATIENT
Start: 2024-03-11 | End: 2024-03-11

## 2024-03-11 RX ORDER — CEFAZOLIN SODIUM 1 G/3ML
2 INJECTION, POWDER, FOR SOLUTION INTRAMUSCULAR; INTRAVENOUS ONCE
Status: COMPLETED | OUTPATIENT
Start: 2024-03-11 | End: 2024-03-11

## 2024-03-11 RX ORDER — OXYCODONE HYDROCHLORIDE 5 MG/1
10 TABLET ORAL EVERY 4 HOURS PRN
Status: DISCONTINUED | OUTPATIENT
Start: 2024-03-12 | End: 2024-03-14 | Stop reason: HOSPADM

## 2024-03-11 RX ORDER — SIMETHICONE 125 MG
125 TABLET,CHEWABLE ORAL 4 TIMES DAILY PRN
Status: DISCONTINUED | OUTPATIENT
Start: 2024-03-11 | End: 2024-03-14 | Stop reason: HOSPADM

## 2024-03-11 RX ORDER — KETOROLAC TROMETHAMINE 15 MG/ML
15 INJECTION, SOLUTION INTRAMUSCULAR; INTRAVENOUS EVERY 6 HOURS
Status: COMPLETED | OUTPATIENT
Start: 2024-03-11 | End: 2024-03-12

## 2024-03-11 RX ORDER — ONDANSETRON 2 MG/ML
4 INJECTION INTRAMUSCULAR; INTRAVENOUS EVERY 6 HOURS PRN
Status: DISPENSED | OUTPATIENT
Start: 2024-03-11 | End: 2024-03-12

## 2024-03-11 RX ORDER — ACETAMINOPHEN 500 MG
1000 TABLET ORAL EVERY 6 HOURS
Status: DISPENSED | OUTPATIENT
Start: 2024-03-11 | End: 2024-03-12

## 2024-03-11 RX ORDER — OXYCODONE HCL 5 MG/5 ML
5 SOLUTION, ORAL ORAL
Status: COMPLETED | OUTPATIENT
Start: 2024-03-11 | End: 2024-03-11

## 2024-03-11 RX ORDER — OXYCODONE HCL 5 MG/5 ML
10 SOLUTION, ORAL ORAL
Status: COMPLETED | OUTPATIENT
Start: 2024-03-11 | End: 2024-03-11

## 2024-03-11 RX ORDER — HYDROMORPHONE HYDROCHLORIDE 1 MG/ML
0.4 INJECTION, SOLUTION INTRAMUSCULAR; INTRAVENOUS; SUBCUTANEOUS
Status: DISCONTINUED | OUTPATIENT
Start: 2024-03-11 | End: 2024-03-11 | Stop reason: HOSPADM

## 2024-03-11 RX ORDER — MAGNESIUM SULFATE HEPTAHYDRATE 40 MG/ML
4 INJECTION, SOLUTION INTRAVENOUS ONCE
Status: COMPLETED | OUTPATIENT
Start: 2024-03-11 | End: 2024-03-11

## 2024-03-11 RX ORDER — IBUPROFEN 800 MG/1
800 TABLET ORAL EVERY 8 HOURS
Qty: 9 TABLET | Refills: 0 | Status: DISCONTINUED | OUTPATIENT
Start: 2024-03-12 | End: 2024-03-14 | Stop reason: HOSPADM

## 2024-03-11 RX ORDER — NIFEDIPINE 30 MG/1
30 TABLET, EXTENDED RELEASE ORAL
Status: DISCONTINUED | OUTPATIENT
Start: 2024-03-11 | End: 2024-03-14 | Stop reason: HOSPADM

## 2024-03-11 RX ORDER — OXYCODONE HYDROCHLORIDE 5 MG/1
5 TABLET ORAL EVERY 4 HOURS PRN
Status: DISCONTINUED | OUTPATIENT
Start: 2024-03-12 | End: 2024-03-14 | Stop reason: HOSPADM

## 2024-03-11 RX ORDER — DIPHENHYDRAMINE HYDROCHLORIDE 50 MG/ML
12.5 INJECTION INTRAMUSCULAR; INTRAVENOUS EVERY 6 HOURS PRN
Status: ACTIVE | OUTPATIENT
Start: 2024-03-11 | End: 2024-03-12

## 2024-03-11 RX ORDER — DIPHENHYDRAMINE HYDROCHLORIDE 50 MG/ML
25 INJECTION INTRAMUSCULAR; INTRAVENOUS EVERY 6 HOURS PRN
Status: ACTIVE | OUTPATIENT
Start: 2024-03-11 | End: 2024-03-12

## 2024-03-11 RX ORDER — ACETAMINOPHEN 500 MG
1000 TABLET ORAL EVERY 6 HOURS PRN
Status: DISCONTINUED | OUTPATIENT
Start: 2024-03-15 | End: 2024-03-12

## 2024-03-11 RX ORDER — HALOPERIDOL 5 MG/ML
1 INJECTION INTRAMUSCULAR
Status: DISCONTINUED | OUTPATIENT
Start: 2024-03-11 | End: 2024-03-11 | Stop reason: HOSPADM

## 2024-03-11 RX ORDER — HYDROMORPHONE HYDROCHLORIDE 1 MG/ML
0.2 INJECTION, SOLUTION INTRAMUSCULAR; INTRAVENOUS; SUBCUTANEOUS
Status: DISCONTINUED | OUTPATIENT
Start: 2024-03-11 | End: 2024-03-11 | Stop reason: HOSPADM

## 2024-03-11 RX ORDER — ACETAMINOPHEN 500 MG
1000 TABLET ORAL EVERY 6 HOURS
Qty: 24 TABLET | Refills: 0 | Status: DISCONTINUED | OUTPATIENT
Start: 2024-03-12 | End: 2024-03-12

## 2024-03-11 RX ORDER — PHENYLEPHRINE HYDROCHLORIDE 10 MG/ML
INJECTION, SOLUTION INTRAMUSCULAR; INTRAVENOUS; SUBCUTANEOUS PRN
Status: DISCONTINUED | OUTPATIENT
Start: 2024-03-11 | End: 2024-03-11 | Stop reason: SURG

## 2024-03-11 RX ORDER — OXYTOCIN 10 [USP'U]/ML
10 INJECTION, SOLUTION INTRAMUSCULAR; INTRAVENOUS
Status: DISCONTINUED | OUTPATIENT
Start: 2024-03-11 | End: 2024-03-13

## 2024-03-11 RX ORDER — ONDANSETRON 2 MG/ML
4 INJECTION INTRAMUSCULAR; INTRAVENOUS EVERY 6 HOURS PRN
Status: DISCONTINUED | OUTPATIENT
Start: 2024-03-12 | End: 2024-03-14 | Stop reason: HOSPADM

## 2024-03-11 RX ORDER — OXYTOCIN 10 [USP'U]/ML
10 INJECTION, SOLUTION INTRAMUSCULAR; INTRAVENOUS
Status: DISCONTINUED | OUTPATIENT
Start: 2024-03-11 | End: 2024-03-11 | Stop reason: HOSPADM

## 2024-03-11 RX ORDER — IBUPROFEN 800 MG/1
800 TABLET ORAL EVERY 8 HOURS PRN
Status: DISCONTINUED | OUTPATIENT
Start: 2024-03-15 | End: 2024-03-14 | Stop reason: HOSPADM

## 2024-03-11 RX ORDER — MEPERIDINE HYDROCHLORIDE 25 MG/ML
12.5 INJECTION INTRAMUSCULAR; INTRAVENOUS; SUBCUTANEOUS
Status: DISCONTINUED | OUTPATIENT
Start: 2024-03-11 | End: 2024-03-11 | Stop reason: HOSPADM

## 2024-03-11 RX ORDER — ACETAMINOPHEN 500 MG
1000 TABLET ORAL
Status: DISCONTINUED | OUTPATIENT
Start: 2024-03-11 | End: 2024-03-11 | Stop reason: HOSPADM

## 2024-03-11 RX ORDER — EPHEDRINE SULFATE 50 MG/ML
10 INJECTION, SOLUTION INTRAVENOUS
Status: ACTIVE | OUTPATIENT
Start: 2024-03-11 | End: 2024-03-12

## 2024-03-11 RX ORDER — LIDOCAINE HYDROCHLORIDE 10 MG/ML
20 INJECTION, SOLUTION INFILTRATION; PERINEURAL
Status: DISCONTINUED | OUTPATIENT
Start: 2024-03-11 | End: 2024-03-11 | Stop reason: HOSPADM

## 2024-03-11 RX ORDER — CITRIC ACID/SODIUM CITRATE 334-500MG
30 SOLUTION, ORAL ORAL ONCE
Status: COMPLETED | OUTPATIENT
Start: 2024-03-11 | End: 2024-03-11

## 2024-03-11 RX ORDER — SODIUM CHLORIDE, SODIUM GLUCONATE, SODIUM ACETATE, POTASSIUM CHLORIDE AND MAGNESIUM CHLORIDE 526; 502; 368; 37; 30 MG/100ML; MG/100ML; MG/100ML; MG/100ML; MG/100ML
INJECTION, SOLUTION INTRAVENOUS
Status: DISCONTINUED | OUTPATIENT
Start: 2024-03-11 | End: 2024-03-11 | Stop reason: SURG

## 2024-03-11 RX ORDER — ONDANSETRON 2 MG/ML
4 INJECTION INTRAMUSCULAR; INTRAVENOUS
Status: DISCONTINUED | OUTPATIENT
Start: 2024-03-11 | End: 2024-03-11 | Stop reason: HOSPADM

## 2024-03-11 RX ORDER — ONDANSETRON 4 MG/1
4 TABLET, ORALLY DISINTEGRATING ORAL EVERY 6 HOURS PRN
Status: DISCONTINUED | OUTPATIENT
Start: 2024-03-12 | End: 2024-03-14 | Stop reason: HOSPADM

## 2024-03-11 RX ORDER — SODIUM CHLORIDE, SODIUM LACTATE, POTASSIUM CHLORIDE, CALCIUM CHLORIDE 600; 310; 30; 20 MG/100ML; MG/100ML; MG/100ML; MG/100ML
INJECTION, SOLUTION INTRAVENOUS ONCE
Status: ACTIVE | OUTPATIENT
Start: 2024-03-11 | End: 2024-03-12

## 2024-03-11 RX ORDER — CALCIUM GLUCONATE 94 MG/ML
1 INJECTION, SOLUTION INTRAVENOUS
Status: DISCONTINUED | OUTPATIENT
Start: 2024-03-11 | End: 2024-03-11 | Stop reason: HOSPADM

## 2024-03-11 RX ORDER — MORPHINE SULFATE 0.5 MG/ML
INJECTION, SOLUTION EPIDURAL; INTRATHECAL; INTRAVENOUS
Status: COMPLETED | OUTPATIENT
Start: 2024-03-11 | End: 2024-03-11

## 2024-03-11 RX ORDER — OXYCODONE HYDROCHLORIDE 5 MG/1
5 TABLET ORAL EVERY 4 HOURS PRN
Status: ACTIVE | OUTPATIENT
Start: 2024-03-11 | End: 2024-03-12

## 2024-03-11 RX ORDER — DOCUSATE SODIUM 100 MG/1
100 CAPSULE, LIQUID FILLED ORAL 2 TIMES DAILY PRN
Status: DISCONTINUED | OUTPATIENT
Start: 2024-03-11 | End: 2024-03-14 | Stop reason: HOSPADM

## 2024-03-11 RX ORDER — DEXAMETHASONE SODIUM PHOSPHATE 4 MG/ML
INJECTION, SOLUTION INTRA-ARTICULAR; INTRALESIONAL; INTRAMUSCULAR; INTRAVENOUS; SOFT TISSUE PRN
Status: DISCONTINUED | OUTPATIENT
Start: 2024-03-11 | End: 2024-03-11 | Stop reason: SURG

## 2024-03-11 RX ORDER — HYDRALAZINE HYDROCHLORIDE 20 MG/ML
5 INJECTION INTRAMUSCULAR; INTRAVENOUS
Status: DISCONTINUED | OUTPATIENT
Start: 2024-03-11 | End: 2024-03-11 | Stop reason: HOSPADM

## 2024-03-11 RX ORDER — BUPIVACAINE HYDROCHLORIDE 7.5 MG/ML
INJECTION, SOLUTION INTRASPINAL
Status: COMPLETED | OUTPATIENT
Start: 2024-03-11 | End: 2024-03-11

## 2024-03-11 RX ORDER — TRANEXAMIC ACID 100 MG/ML
INJECTION, SOLUTION INTRAVENOUS PRN
Status: DISCONTINUED | OUTPATIENT
Start: 2024-03-11 | End: 2024-03-11 | Stop reason: SURG

## 2024-03-11 RX ORDER — DIPHENHYDRAMINE HYDROCHLORIDE 50 MG/ML
25 INJECTION INTRAMUSCULAR; INTRAVENOUS EVERY 6 HOURS PRN
Status: DISCONTINUED | OUTPATIENT
Start: 2024-03-12 | End: 2024-03-14 | Stop reason: HOSPADM

## 2024-03-11 RX ORDER — KETOROLAC TROMETHAMINE 15 MG/ML
INJECTION, SOLUTION INTRAMUSCULAR; INTRAVENOUS PRN
Status: DISCONTINUED | OUTPATIENT
Start: 2024-03-11 | End: 2024-03-11 | Stop reason: SURG

## 2024-03-11 RX ADMIN — SODIUM CHLORIDE, SODIUM GLUCONATE, SODIUM ACETATE, POTASSIUM CHLORIDE AND MAGNESIUM CHLORIDE: 526; 502; 368; 37; 30 INJECTION, SOLUTION INTRAVENOUS at 06:47

## 2024-03-11 RX ADMIN — HYDRALAZINE HYDROCHLORIDE 5 MG: 20 INJECTION, SOLUTION INTRAMUSCULAR; INTRAVENOUS at 03:08

## 2024-03-11 RX ADMIN — MAGNESIUM SULFATE IN WATER 2 G/HR: 40 INJECTION, SOLUTION INTRAVENOUS at 03:28

## 2024-03-11 RX ADMIN — CEFAZOLIN 2 G: 1 INJECTION, POWDER, FOR SOLUTION INTRAMUSCULAR; INTRAVENOUS at 06:30

## 2024-03-11 RX ADMIN — TRANEXAMIC ACID 1000 MG: 100 INJECTION, SOLUTION INTRAVENOUS at 06:54

## 2024-03-11 RX ADMIN — SODIUM CITRATE AND CITRIC ACID MONOHYDRATE 30 ML: 334; 500 SOLUTION ORAL at 05:52

## 2024-03-11 RX ADMIN — SODIUM CHLORIDE, SODIUM GLUCONATE, SODIUM ACETATE, POTASSIUM CHLORIDE AND MAGNESIUM CHLORIDE 500 ML: 526; 502; 368; 37; 30 INJECTION, SOLUTION INTRAVENOUS at 05:51

## 2024-03-11 RX ADMIN — ACETAMINOPHEN 1000 MG: 500 TABLET, FILM COATED ORAL at 13:03

## 2024-03-11 RX ADMIN — ACETAMINOPHEN 1000 MG: 500 TABLET, FILM COATED ORAL at 18:21

## 2024-03-11 RX ADMIN — NIFEDIPINE 30 MG: 30 TABLET, FILM COATED, EXTENDED RELEASE ORAL at 05:10

## 2024-03-11 RX ADMIN — KETOROLAC TROMETHAMINE 15 MG: 15 INJECTION, SOLUTION INTRAMUSCULAR; INTRAVENOUS at 07:19

## 2024-03-11 RX ADMIN — MAGNESIUM SULFATE IN WATER FOR 4 G: 40 INJECTION INTRAVENOUS at 03:07

## 2024-03-11 RX ADMIN — ONDANSETRON 4 MG: 2 INJECTION INTRAMUSCULAR; INTRAVENOUS at 13:31

## 2024-03-11 RX ADMIN — PHENYLEPHRINE HYDROCHLORIDE 100 MCG: 10 INJECTION INTRAVENOUS at 06:28

## 2024-03-11 RX ADMIN — KETOROLAC TROMETHAMINE 15 MG: 15 INJECTION, SOLUTION INTRAMUSCULAR; INTRAVENOUS at 13:02

## 2024-03-11 RX ADMIN — DEXAMETHASONE SODIUM PHOSPHATE 8 MG: 4 INJECTION INTRA-ARTICULAR; INTRALESIONAL; INTRAMUSCULAR; INTRAVENOUS; SOFT TISSUE at 06:29

## 2024-03-11 RX ADMIN — FENTANYL CITRATE 50 MCG: 50 INJECTION, SOLUTION INTRAMUSCULAR; INTRAVENOUS at 08:47

## 2024-03-11 RX ADMIN — MAGNESIUM SULFATE IN WATER 2 G/HR: 40 INJECTION, SOLUTION INTRAVENOUS at 08:11

## 2024-03-11 RX ADMIN — OXYTOCIN 75 ML/HR: 10 INJECTION, SOLUTION INTRAMUSCULAR; INTRAVENOUS at 08:06

## 2024-03-11 RX ADMIN — MAGNESIUM SULFATE HEPTAHYDRATE 4 G: 40 INJECTION, SOLUTION INTRAVENOUS at 03:07

## 2024-03-11 RX ADMIN — SODIUM CHLORIDE, POTASSIUM CHLORIDE, SODIUM LACTATE AND CALCIUM CHLORIDE: 600; 310; 30; 20 INJECTION, SOLUTION INTRAVENOUS at 14:59

## 2024-03-11 RX ADMIN — KETOROLAC TROMETHAMINE 15 MG: 15 INJECTION, SOLUTION INTRAMUSCULAR; INTRAVENOUS at 18:21

## 2024-03-11 RX ADMIN — OXYCODONE HYDROCHLORIDE 10 MG: 5 SOLUTION ORAL at 08:46

## 2024-03-11 RX ADMIN — BUPIVACAINE HYDROCHLORIDE IN DEXTROSE 1.7 ML: 7.5 INJECTION, SOLUTION SUBARACHNOID at 06:28

## 2024-03-11 RX ADMIN — SODIUM CHLORIDE, POTASSIUM CHLORIDE, SODIUM LACTATE AND CALCIUM CHLORIDE: 600; 310; 30; 20 INJECTION, SOLUTION INTRAVENOUS at 03:06

## 2024-03-11 RX ADMIN — ONDANSETRON 4 MG: 2 INJECTION INTRAMUSCULAR; INTRAVENOUS at 06:29

## 2024-03-11 RX ADMIN — SODIUM CHLORIDE, SODIUM GLUCONATE, SODIUM ACETATE, POTASSIUM CHLORIDE AND MAGNESIUM CHLORIDE: 526; 502; 368; 37; 30 INJECTION, SOLUTION INTRAVENOUS at 06:14

## 2024-03-11 RX ADMIN — OXYTOCIN 20 UNITS: 10 INJECTION, SOLUTION INTRAMUSCULAR; INTRAVENOUS at 06:51

## 2024-03-11 RX ADMIN — PHENYLEPHRINE HYDROCHLORIDE 100 MCG: 10 INJECTION INTRAVENOUS at 06:31

## 2024-03-11 RX ADMIN — METOCLOPRAMIDE 10 MG: 5 INJECTION, SOLUTION INTRAMUSCULAR; INTRAVENOUS at 05:52

## 2024-03-11 RX ADMIN — MORPHINE SULFATE 150 MCG: 0.5 INJECTION, SOLUTION EPIDURAL; INTRATHECAL; INTRAVENOUS at 06:28

## 2024-03-11 RX ADMIN — FAMOTIDINE 20 MG: 10 INJECTION, SOLUTION INTRAVENOUS at 05:52

## 2024-03-11 ASSESSMENT — PATIENT HEALTH QUESTIONNAIRE - PHQ9
SUM OF ALL RESPONSES TO PHQ9 QUESTIONS 1 AND 2: 0
1. LITTLE INTEREST OR PLEASURE IN DOING THINGS: NOT AT ALL
2. FEELING DOWN, DEPRESSED, IRRITABLE, OR HOPELESS: NOT AT ALL

## 2024-03-11 ASSESSMENT — ENCOUNTER SYMPTOMS
DOUBLE VISION: 0
PSYCHIATRIC NEGATIVE: 1
MUSCULOSKELETAL NEGATIVE: 1
NEUROLOGICAL NEGATIVE: 1
CARDIOVASCULAR NEGATIVE: 1
CONSTITUTIONAL NEGATIVE: 1
BLURRED VISION: 0
GASTROINTESTINAL NEGATIVE: 1
HEADACHES: 0
RESPIRATORY NEGATIVE: 1
EYES NEGATIVE: 1

## 2024-03-11 ASSESSMENT — PAIN DESCRIPTION - PAIN TYPE
TYPE: SURGICAL PAIN

## 2024-03-11 ASSESSMENT — LIFESTYLE VARIABLES: EVER_SMOKED: NEVER

## 2024-03-11 ASSESSMENT — PAIN SCALES - GENERAL: PAIN_LEVEL: 3

## 2024-03-11 NOTE — PROGRESS NOTES
0705-Report received from Jaquelin ROBERTSON. POC discussed, relief count performed. All questions have been answered at this time, care assumed.     1200-Bedside report given to Lupe ROBERTSON. POC discussed, fundal rub performed, bands verified x2. All questions have been answered at this time, care relinquished.

## 2024-03-11 NOTE — CARE PLAN
The patient is Stable - Low risk of patient condition declining or worsening    Shift Goals  Clinical Goals: vss, lochia WNL, adequate pain  Patient Goals: Healthy mom healthy baby  Family Goals: Adequate support of pt and baby      Problem: Psychosocial - Postpartum  Goal: Patient will verbalize and demonstrate effective bonding and parenting behavior  Outcome: Progressing  Note: MOB has shown adequate bonding with infant, provided skin to skin, and proper cares of infant.      Problem: Knowledge Deficit - Postpartum  Goal: Patient will verbalize and demonstrate understanding of self and infant care  Outcome: Progressing  Note: MOB demonstrates and verbalizes understanding on how to care for . Asks appropriate questions and calls for help when necessary. Education has been provided to patient.

## 2024-03-11 NOTE — OP REPORT
PREOPERATIVE DIAGNOSIS:   Barnes gestation at 35 weeks 0 days   Pre-eclampsia with severe features   History of prior  section x1    POSTOPERATIVE DIAGNOSIS:  Same, delivered    PROCEDURE: Primary Low Transverse  Section via Pfannensteil    SURGEON: Jassi Kemp D.O.    ASSISTANT: Lindsey Calvin CNM    ANESTHESIA: Nickolas Green M.D.    COMPLICATIONS: none    EBL:  900 cc    FLUIDS: 1000 cc LR    INDICATIONS: Pre-eclampsia with severe features at 35 weeks 0 days with prior low transverse  section x1     FINDINGS: Viable male infant in vertex presentation with clear fluid, internally verted to breech due to infant rotating to transverse back down, apgars 6 and 8, weight pending. Normal uterus, tubes, ovaries.     PROCEDURE IN DETAIL:     The risks, benefits, indications and alternatives of the procedure were reviewed with the patient and informed consent was obtained. The patient was taken to the operating room where spinal anesthesia was obtained without difficulty. She was then prepped and draped in the normal, sterile fashion in the dorsal supine  position with a leftward tilt.    A Pfannenstiel skin incision was then made with the scalpel and carried through to the underlying layer of fascia. The fascia was incised in the midline and the incision extended laterally with the Alcaraz scissors. The superior aspect of the fascial incision was grasped with the Kocher clamps, elevated, and the underlying rectus muscles dissected off bluntly and aided with alcaraz scissors. Attention was then turned to the inferior aspect of this incision, which, in a similar fashion, was grasped, tented up with the Kocher clamps and the rectus muscle dissected off bluntly and aided with alcaraz scissors.     Rectus muscles were then  at the midline; the peritoneum identified, tented up, and entered digitally. The peritoneal incision was then extended superiorly and inferiorly with good visualization of the  bladder. The bladder blade was then inserted. The vesicouterine peritoneum was then identified, grasped with the pick-ups and entered sharply with the Metzenbaum scissors. This incision was then extended laterally and  the bladder flap created digitally. The bladder blade was then reinserted.    Next, the lower uterine segment was incised in a transverse fashion with the scalpel. The uterine incision was then  extended manually. The amniotic sac was artificially ruptured, productive of clear fluid. The bladder blade was removed. Infant was initially in vertex presentation but rotated to transverse back down lie during delivery attempt.  Infant was internally verted to breech and feet were grasped and brought through the  level of the hysterectomy. A blue towel was placed around the sacrum and Loveset’s maneuver was performed to level of the scapula. The bilateral arms were delivered using Pinard’s maneuver followed by the fetal head using the Mauriceau- Smellie-Veit maneuver.  The nose and mouth were suctioned with the bulb syringe, and the cord doubly clamped and cut. The infant was handed off to the waiting pediatricians. Cord gases were obtained.    The placenta was then removed spontaneously with gently traction on the umbilical cord; the uterus was then  exteriorized, and cleared of all clots and debris. The uterine incision was repaired with 0-vicryl in a running, locked fashion. A second layer of 0-vicryl was then used to imbricate the hysterotomy. Two figure of eight sutures using 2-0-chromic were then placed on the left aspect of the incision to obtain excellent hemostasis. The posterior cul-de-sac was then cleared of clots and debris.    The uterus was returned to the abdomen and the hysterotomy was again noted to be hemostatic. Surgicel powder was placed along the hysterotomy. The paracolic gutters were cleared of all clots and debris. The fascia was reapproximated with 0-vicryl in a running fashion. The  subcutaneous layer was closed with 3-0 vicryl in an interrupted fashion. The skin was closed with Insorb staples. The incision was then dressed with Mepilex. At the completion of the case, bimanual exam performed with good uterine tone and minimal vaginal bleeding. The patient tolerated the procedure well. Sponge, lap and needle counts were correct times three. The  patient was taken to the recovery room in stable condition.    Jassi Kemp D.O., FACOG

## 2024-03-11 NOTE — PROGRESS NOTES
"2341 Patient is a  at 34weeks and 6days, EDC of 4/15. Arrived to unit and placed in LDA 5, RN placed EFM and toco to ensure fetal well being and monitor uterine activity. RN educated patient on need for monitors and patient verbalized understanding. Vital signs taken. Available prenatal labs / records reviewed by RN. Patient's chief complaint \"My bp was high at home\".  Patient denies leaking or vaginal bleeding, reports positive fetal movement. Used Alex via language line [phone] KStorey notified of patient arrival, report given with an MFTI of 1,  & para reviewed, bps, fhr, contraction pattern reviewed, labs ordered.   0132 Notified Kstorey regarding severe range pressures, orders received for Iv and mag.  0225 Report given to Adina, rogelio relinquished.   "

## 2024-03-11 NOTE — ANESTHESIA TIME REPORT
Anesthesia Start and Stop Event Times       Date Time Event    3/11/2024 0612 Ready for Procedure     0612 Anesthesia Start     0735 Anesthesia Stop          Responsible Staff  03/11/24      Name Role Begin End    Nickolas Green M.D. Anesth 0612 0704    John Paul Perez M.D. Anesth 0704 0735          Overtime Reason:  no overtime (within assigned shift)    Comments:

## 2024-03-11 NOTE — PROGRESS NOTES
1200 Received report from L&D RN. Orientated patient to room, call light and emergency light education provided. Assessment completed, fundus firm, lochia light. Abdominal incision with dressing intact. Plan of care reviewed, patient verbalized understanding. Denies pain at this time, will call if pain med intervention is needed.     1400 patients urine output decreased, readjusted forrester- Urine output 25mL.    1500 - forrester adjusted, adequate urine draining from forrester- Urine output 100 mL. Provided education to patient to drink plenty of fluids, patient verbalized understanding.     1600 patients forrester not adequately draining urine. Replace forrester with 16 Estonian tubing, inflated catheter baloon with 10cc of fluid stat lock in place.

## 2024-03-11 NOTE — PROGRESS NOTES
0225 Report received from Pretty ROBERTSON. Pt taken from LDA5 to S235 via wheelchair. See MAR for magnesium charting.    0300 Justo KLEIN at bedside. TOLAC consent discussed via ipad . Pt agreeable to repeat c/s at this time. Per patient, she last ate at 2100.

## 2024-03-11 NOTE — CARE PLAN
Problem: Knowledge Deficit -   Goal: Patient/support person will demonstrate understanding regarding  section  Outcome: Progressing     Problem: Psychosocial - L&D  Goal: Patient's level of anxiety will decrease  Outcome: Progressing     Problem: Pain  Goal: Patient's pain will be alleviated or reduced to the patient’s comfort goal  Outcome: Progressing     Problem: Risk for Infection and Impaired Wound Healing  Goal: Patient will remain free from infection  Outcome: Progressing  Goal: Patient's wound/surgical incision will decrease in size and heals properly  Outcome: Progressing     Problem: Risk for Fluid Imbalance  Goal: Patient's fluid volume balance will be maintained or improve  Outcome: Progressing     Problem: Risk for Venous Thromboembolism (VTE)  Goal: VTE prevention measures will be implemented and patient will remain free from VTE  Outcome: Progressing     Problem: Respiratory  Goal: Patient will achieve/maintain optimum respiratory ventilation and gas exchange  Outcome: Progressing     Problem: Discharge Barriers/Planning  Goal: Patient's continuum of care needs are met  Outcome: Progressing     The patient is Stable - Low risk of patient condition declining or worsening    Shift Goals  Clinical Goals: Hemodynamically stable  Patient Goals: Adequate pain control  Family Goals: Bond with infant, support mother    Progress made toward(s) clinical / shift goals:  Adequate pain control post . SCD's on for DVT prevention.     Patient is not progressing towards the following goals:

## 2024-03-11 NOTE — ANESTHESIA PREPROCEDURE EVALUATION
Date: 24  Procedure: Procedure Not Yet Scheduled        at 35 weeks, with severe pre eclampsia.       Relevant Problems   CARDIAC   (positive) Severe pre-eclampsia in third trimester      OB   (positive) Diet controlled gestational diabetes mellitus (GDM) in first trimester   (positive) Severe pre-eclampsia in third trimester       Physical Exam    Airway   Mallampati: II  TM distance: >3 FB  Neck ROM: full       Cardiovascular - normal exam  Rhythm: regular  Rate: normal  (-) murmur     Dental - normal exam           Pulmonary - normal exam  Breath sounds clear to auscultation     Abdominal    Neurological - normal exam                   Anesthesia Plan    ASA 3- EMERGENT   ASA physical status 3 criteria: hypertension - poorly controlledASA physical status emergent criteria: compromised vital organ, limb or tissue    Plan - spinal   Neuraxial block will be primary anesthetic                Postoperative Plan: Postoperative administration of opioids is intended.    Pertinent diagnostic labs and testing reviewed    Informed Consent:    Anesthetic plan and risks discussed with patient.

## 2024-03-11 NOTE — PROGRESS NOTES
0430 Report From Adina ROBERTSON. POC discussed. Pt encouraged to use call lights for any needs.   0650 Delivery of viable Male infant. APGARS 6/8.   0705 Report to Vaishnavi ROBERTSON. Care Relinquished.

## 2024-03-11 NOTE — ANESTHESIA PROCEDURE NOTES
Spinal Block    Date/Time: 3/11/2024 6:28 AM    Performed by: Nickolas Green M.D.  Authorized by: Nickolas Green M.D.    Patient Location:  OB  Start Time:  3/11/2024 6:28 AM  End Time:  3/11/2024 6:30 AM  Reason for Block: primary anesthetic    patient identified, IV checked, site marked, risks and benefits discussed, surgical consent, monitors and equipment checked, pre-op evaluation and timeout performed    Patient Position:  Sitting  Prep: ChloraPrep, patient draped and sterile technique    Monitoring:  Blood pressure, continuous pulse oximetry and heart rate  Approach:  Midline  Location:  L4-5  Injection Technique:  Single-shot  Skin infiltration:  Lidocaine  Strength:  1%  Dose:  3ml  Needle Type:  Pencan  Needle Gauge:  25 G  CSF flowing pre/post injection:  Yes  Sensory Level:  T6

## 2024-03-11 NOTE — PROGRESS NOTES
TOLAC counseling via  line with [place] TOLAC consent form.  Following counseling, patient opts for repeat  section.  Declines permanent sterilization.    The risks, benefits and alternatives of  were discussed.  The risks include DVT/pulmonary embolism, pelvic scarring, pelvic pain, infection, bleeding, scarring, injury to bowel, bladder, ureters or blood vessels, anesthesia complications, injury to fetus and death. Future consideration for repeat  section vs trial of labor after  were also mentioned.  I also discussed with the patient the risk of wound infection and wound breakdown. We discussed that these risks are greater in people that have diabetes or obesity. I also discussed the risk of emergency blood transfusion during procedure as well as emergency hysterectomy during procedure. Patient had the opportunity to ask questions regarding procedures. All questions answered to the patient's satisfaction. Pt agrees to proceed with surgery.    Patient last ate at 2100.  Will proceed to OR at 8 hrs NPO as long as blood pressures are stable, sooner if unstable.    Jassi Kemp D.O.

## 2024-03-11 NOTE — ANESTHESIA POSTPROCEDURE EVALUATION
Patient: Liza Pearson    Procedure Summary       Date: 24 Room / Location: [facility] OR 02 / SURGERY LABOR AND DELIVERY    Anesthesia Start: 612 Anesthesia Stop: 735    Procedure:  SECTION, REPEAT (Bilateral: Abdomen) Diagnosis:        delivery delivered      (Pre- E Delivered.)    Surgeons: Jassi Kemp D.O. Responsible Provider: John Paul Perez M.D.    Anesthesia Type: spinal ASA Status: 3 - Emergent            Final Anesthesia Type: spinal  Last vitals  BP   Blood Pressure: 125/68    Temp   36.3 °C (97.4 °F)    Pulse   82   Resp   16    SpO2   93 %      Anesthesia Post Evaluation    Patient location during evaluation: PACU  Patient participation: complete - patient participated  Level of consciousness: awake and alert  Pain score: 3    Airway patency: patent  Anesthetic complications: no  Cardiovascular status: hemodynamically stable  Respiratory status: acceptable  Hydration status: euvolemic    PONV: none          There were no known notable events for this encounter.     Nurse Pain Score: 3 (NPRS)

## 2024-03-12 ENCOUNTER — TELEPHONE (OUTPATIENT)
Dept: OBGYN | Facility: CLINIC | Age: 29
End: 2024-03-12
Payer: MEDICAID

## 2024-03-12 LAB
ALBUMIN SERPL BCP-MCNC: 3.3 G/DL (ref 3.2–4.9)
ALBUMIN/GLOB SERPL: 1 G/DL
ALP SERPL-CCNC: 163 U/L (ref 30–99)
ANION GAP SERPL CALC-SCNC: 17 MMOL/L (ref 7–16)
BILIRUB SERPL-MCNC: <0.2 MG/DL (ref 0.1–1.5)
CALCIUM ALBUM COR SERPL-MCNC: 9.6 MG/DL (ref 8.5–10.5)
CALCIUM SERPL-MCNC: 9 MG/DL (ref 8.5–10.5)
CHLORIDE SERPL-SCNC: 104 MMOL/L (ref 96–112)
CO2 SERPL-SCNC: 17 MMOL/L (ref 20–33)
GLOBULIN SER CALC-MCNC: 3.2 G/DL (ref 1.9–3.5)
GLUCOSE SERPL-MCNC: 104 MG/DL (ref 65–99)
MAGNESIUM SERPL-MCNC: 2.2 MG/DL (ref 1.5–2.5)
MAGNESIUM SERPL-MCNC: 3.1 MG/DL (ref 1.5–2.5)
MAGNESIUM SERPL-MCNC: 5.7 MG/DL (ref 1.5–2.5)
MAGNESIUM SERPL-MCNC: 6 MG/DL (ref 1.5–2.5)
POTASSIUM SERPL-SCNC: 4.2 MMOL/L (ref 3.6–5.5)
PROT SERPL-MCNC: 6.5 G/DL (ref 6–8.2)
SODIUM SERPL-SCNC: 138 MMOL/L (ref 135–145)

## 2024-03-12 PROCEDURE — 770002 HCHG ROOM/CARE - OB PRIVATE (112)

## 2024-03-12 PROCEDURE — A9270 NON-COVERED ITEM OR SERVICE: HCPCS | Performed by: NURSE PRACTITIONER

## 2024-03-12 PROCEDURE — A9270 NON-COVERED ITEM OR SERVICE: HCPCS | Performed by: ANESTHESIOLOGY

## 2024-03-12 PROCEDURE — 700111 HCHG RX REV CODE 636 W/ 250 OVERRIDE (IP): Performed by: NURSE PRACTITIONER

## 2024-03-12 PROCEDURE — 700111 HCHG RX REV CODE 636 W/ 250 OVERRIDE (IP): Performed by: ANESTHESIOLOGY

## 2024-03-12 PROCEDURE — 700102 HCHG RX REV CODE 250 W/ 637 OVERRIDE(OP): Performed by: ANESTHESIOLOGY

## 2024-03-12 PROCEDURE — 36415 COLL VENOUS BLD VENIPUNCTURE: CPT

## 2024-03-12 PROCEDURE — 83735 ASSAY OF MAGNESIUM: CPT | Mod: 91

## 2024-03-12 PROCEDURE — 700102 HCHG RX REV CODE 250 W/ 637 OVERRIDE(OP): Performed by: STUDENT IN AN ORGANIZED HEALTH CARE EDUCATION/TRAINING PROGRAM

## 2024-03-12 PROCEDURE — 700102 HCHG RX REV CODE 250 W/ 637 OVERRIDE(OP): Performed by: NURSE PRACTITIONER

## 2024-03-12 PROCEDURE — A9270 NON-COVERED ITEM OR SERVICE: HCPCS | Performed by: STUDENT IN AN ORGANIZED HEALTH CARE EDUCATION/TRAINING PROGRAM

## 2024-03-12 RX ORDER — ACETAMINOPHEN 500 MG
1000 TABLET ORAL EVERY 6 HOURS
Qty: 20 TABLET | Refills: 0 | Status: DISCONTINUED | OUTPATIENT
Start: 2024-03-12 | End: 2024-03-14 | Stop reason: HOSPADM

## 2024-03-12 RX ORDER — ACETAMINOPHEN 500 MG
1000 TABLET ORAL EVERY 6 HOURS PRN
Status: DISCONTINUED | OUTPATIENT
Start: 2024-03-15 | End: 2024-03-12

## 2024-03-12 RX ORDER — ACETAMINOPHEN 500 MG
1000 TABLET ORAL EVERY 6 HOURS
Status: DISCONTINUED | OUTPATIENT
Start: 2024-03-12 | End: 2024-03-12

## 2024-03-12 RX ORDER — ACETAMINOPHEN 500 MG
1000 TABLET ORAL EVERY 6 HOURS PRN
Status: DISCONTINUED | OUTPATIENT
Start: 2024-03-15 | End: 2024-03-14 | Stop reason: HOSPADM

## 2024-03-12 RX ADMIN — ACETAMINOPHEN 1000 MG: 500 TABLET, FILM COATED ORAL at 13:49

## 2024-03-12 RX ADMIN — NIFEDIPINE 30 MG: 30 TABLET, FILM COATED, EXTENDED RELEASE ORAL at 06:17

## 2024-03-12 RX ADMIN — ACETAMINOPHEN 1000 MG: 500 TABLET, FILM COATED ORAL at 20:09

## 2024-03-12 RX ADMIN — KETOROLAC TROMETHAMINE 15 MG: 15 INJECTION, SOLUTION INTRAMUSCULAR; INTRAVENOUS at 06:17

## 2024-03-12 RX ADMIN — ACETAMINOPHEN 1000 MG: 500 TABLET, FILM COATED ORAL at 06:16

## 2024-03-12 RX ADMIN — KETOROLAC TROMETHAMINE 15 MG: 15 INJECTION, SOLUTION INTRAMUSCULAR; INTRAVENOUS at 00:17

## 2024-03-12 RX ADMIN — MAGNESIUM SULFATE IN WATER 2 G/HR: 40 INJECTION, SOLUTION INTRAVENOUS at 04:40

## 2024-03-12 RX ADMIN — IBUPROFEN 800 MG: 800 TABLET, FILM COATED ORAL at 14:57

## 2024-03-12 ASSESSMENT — PAIN DESCRIPTION - PAIN TYPE
TYPE: ACUTE PAIN
TYPE: SURGICAL PAIN
TYPE: ACUTE PAIN
TYPE: ACUTE PAIN
TYPE: SURGICAL PAIN
TYPE: ACUTE PAIN

## 2024-03-12 ASSESSMENT — EDINBURGH POSTNATAL DEPRESSION SCALE (EPDS)
THE THOUGHT OF HARMING MYSELF HAS OCCURRED TO ME: NEVER
I HAVE FELT SCARED OR PANICKY FOR NO GOOD REASON: NO, NOT AT ALL
THINGS HAVE BEEN GETTING ON TOP OF ME: NO, I HAVE BEEN COPING AS WELL AS EVER
I HAVE BEEN SO UNHAPPY THAT I HAVE BEEN CRYING: NO, NEVER
I HAVE BEEN ABLE TO LAUGH AND SEE THE FUNNY SIDE OF THINGS: AS MUCH AS I ALWAYS COULD
I HAVE FELT SAD OR MISERABLE: NO, NOT AT ALL
I HAVE BLAMED MYSELF UNNECESSARILY WHEN THINGS WENT WRONG: NO, NEVER
I HAVE BEEN SO UNHAPPY THAT I HAVE HAD DIFFICULTY SLEEPING: NOT AT ALL
I HAVE LOOKED FORWARD WITH ENJOYMENT TO THINGS: AS MUCH AS I EVER DID
I HAVE BEEN ANXIOUS OR WORRIED FOR NO GOOD REASON: NO, NOT AT ALL

## 2024-03-12 NOTE — PROGRESS NOTES
1910- Received report from Lupe ROBERTSON. Assumed care. 12 hour chart check, MAR and orders reviewed.      2015- Assessment complete. Fundus firm at umbilicus and palpable, lochia scant to light rubra. Pain management and interventions discussed with pt. SO at bedside. POC discussed. All questions and concerns discussed. No further concerns. Discussed all of this in Bengali

## 2024-03-12 NOTE — CARE PLAN
The patient is Stable - Low risk of patient condition declining or worsening    Shift Goals  Clinical Goals: VSS, lochia WNL  Patient Goals: Healthy mom healthy baby  Family Goals: Adequate support of pt and baby    Progress made toward(s) clinical / shift goals:      Problem: Knowledge Deficit - Postpartum  Goal: Patient will verbalize and demonstrate understanding of self and infant care  Outcome: Progressing     Problem: Psychosocial - Postpartum  Goal: Patient will verbalize and demonstrate effective bonding and parenting behavior  Outcome: Progressing     Problem: Altered Physiologic Condition  Goal: Patient physiologically stable as evidenced by normal lochia, palpable uterine involution and vitals within normal limits  Outcome: Progressing

## 2024-03-12 NOTE — PROGRESS NOTES
Post Partum Progress Note    Name:   Liza Pearson   Date/Time:  3/12/2024 - 6:05 AM  Chief Admitting Dx:  Indication for care in labor or delivery [O75.9]  Delivery Type:   for repeat  Post-Op/Post Partum Days #:  1    Subjective:  Abdominal pain: no  Ambulating:   yes  Tolerating liquids:  no  Tolerating food:  yes common adult  Flatus:   no  BM:    no  Bleeding:   with a small amount of bleeding  Voiding:   N\A  Dizziness:   no  Feeding:   bottle - Similac with iron    Vitals:    24 0200 24 0300 24 0400 24 0500   BP: 129/71 113/71 115/75 117/70   Pulse: 92 90 88 85   Resp: 17 16 16 17   Temp: 36.4 °C (97.6 °F)  37.1 °C (98.8 °F)    TempSrc: Temporal  Temporal    SpO2: 97% 93% 89% 92%   Weight:       Height:           Exam:  Breast: Tenderness no  Abdomen: Abdomen soft, non-tender. BS normal. No masses,  No organomegaly  Fundal Tenderness:  no  Fundus Firm: yes  Incision: dry and intact  Below umbilicus: yes  Perineum: perineum intact  Lochia: moderate  Extremities: Normal extremities, peripheral pulses and reflexes normal, no edema, redness or tenderness in the calves or thighs    Meds:  Current Facility-Administered Medications   Medication Dose    LR infusion      oxytocin (Pitocin) infusion (for post delivery)  125 mL/hr    lactated ringers infusion      magnesium sulfate 40 g/1000mL infusion  2 g/hr    NIFEdipine SR (Procadia-XL) tablet 30 mg  30 mg    lactated ringers (LR) infusion      LR infusion      oxytocin (Pitocin) infusion (for post delivery)  125 mL/hr    oxytocin (Pitocin) injection 10 Units  10 Units    acetaminophen (Tylenol) tablet 1,000 mg  1,000 mg    ketorolac (Toradol) 15 MG/ML injection 15 mg  15 mg    oxyCODONE immediate-release (Roxicodone) tablet 5 mg  5 mg    oxyCODONE immediate-release (Roxicodone) tablet 10 mg  10 mg    HYDROmorphone (Dilaudid) injection 0.2 mg  0.2 mg    ePHEDrine injection 10 mg  10 mg    ondansetron (Zofran) syringe/vial  injection 4 mg  4 mg    diphenhydrAMINE (Benadryl) injection 12.5 mg  12.5 mg    Or    diphenhydrAMINE (Benadryl) injection 25 mg  25 mg    Or    naloxone HCl (Narcan) 20 mg in  mL infusion  0.4 mg/hr    lactated ringers infusion      lactated ringers infusion      ibuprofen (Motrin) tablet 800 mg  800 mg    Followed by    [START ON 3/15/2024] ibuprofen (Motrin) tablet 800 mg  800 mg    acetaminophen (Tylenol) tablet 1,000 mg  1,000 mg    Followed by    [START ON 3/15/2024] acetaminophen (Tylenol) tablet 1,000 mg  1,000 mg    oxyCODONE immediate-release (Roxicodone) tablet 5 mg  5 mg    oxyCODONE immediate-release (Roxicodone) tablet 10 mg  10 mg    ondansetron (Zofran) syringe/vial injection 4 mg  4 mg    Or    ondansetron (Zofran ODT) dispertab 4 mg  4 mg    diphenhydrAMINE (Benadryl) tablet/capsule 25 mg  25 mg    Or    diphenhydrAMINE (Benadryl) injection 25 mg  25 mg    docusate sodium (Colace) capsule 100 mg  100 mg    simethicone (Mylicon) chewable tablet 125 mg  125 mg       Labs:   Recent Labs     24  0207 24  0520 24  1800   WBC 8.2 8.8 16.4*   RBC 3.76* 3.71* 3.22*   HEMOGLOBIN 13.1 12.7 11.2*   HEMATOCRIT 36.7* 36.6* 32.8*   MCV 97.6 98.7* 101.9*   MCH 34.8* 34.2* 34.8*   MCHC 35.7* 34.7 34.1   RDW 44.3 45.1 47.0   PLATELETCT 221 220 226   MPV 11.5 11.4 11.2       Assessment:  Chief Admitting Dx:  Indication for care in labor or delivery [O75.9]  Delivery Type:   for repeat  Tubal Ligation:  no    Plan:  Continue routine post partum care.  Magnesium will be turned off today     [place] Jeronimo

## 2024-03-12 NOTE — CARE PLAN
The patient is Stable - Low risk of patient condition declining or worsening    Shift Goals  Clinical Goals: discharge  Patient Goals: Healthy mom healthy baby  Family Goals: Adequate support of pt and baby    Progress made toward(s) clinical / shift goals:  vss lochia scant. States adequate pain control so far with scheduled meds    Patient is not progressing towards the following goals:

## 2024-03-12 NOTE — LACTATION NOTE
This note was copied from a baby's chart.  Mom is a 30 y/o P2 who delivered baby boy weighing 6 # 10.9 oz at 35 wks. Mom has a history of GDM diet controlled and GHTN with Pre-e. Baby was in bassinet with large fluffy blankets around her. LC removed the large blankets and double swaddled baby and handed her mother. LC reviewed safe sleep  Mom reports she breast fed her last baby for 3 months but also used a bottle.   Mom reports she has been bottle feeding because she has no milk.   LC discussed supply and demand and stimulation of breasts for increasing milk supply.   LC provided mom with information on LPI and characteristics as well as supplemental volumes to provide. LC reviewed burping baby when bottling   Mom says she tries to put baby to breast but baby doesn't feed but bedside RN have not seen a latch. LC offered assistance with next feeding if mother was not interested. She did not respond via .   Mom does not have a pump at home and bedside pump has not been initiated.    will order a manual pump for mom to use at bedside and if interested in bringing in her milk supply by using the manual routinely Q 3 hours then an electric pump kit could be set up.   SIOBHAN discussed the 3 step plan with mom and  family and each step was explained. Mom nodding in acknowledgement.  LC reported off to bedside RN and made her aware of teaching and pump arrival.  Translation services used Peggy #[id number]

## 2024-03-13 PROCEDURE — A9270 NON-COVERED ITEM OR SERVICE: HCPCS | Performed by: NURSE PRACTITIONER

## 2024-03-13 PROCEDURE — A9270 NON-COVERED ITEM OR SERVICE: HCPCS | Performed by: STUDENT IN AN ORGANIZED HEALTH CARE EDUCATION/TRAINING PROGRAM

## 2024-03-13 PROCEDURE — 770002 HCHG ROOM/CARE - OB PRIVATE (112)

## 2024-03-13 PROCEDURE — 700102 HCHG RX REV CODE 250 W/ 637 OVERRIDE(OP): Performed by: NURSE PRACTITIONER

## 2024-03-13 PROCEDURE — 700102 HCHG RX REV CODE 250 W/ 637 OVERRIDE(OP): Performed by: STUDENT IN AN ORGANIZED HEALTH CARE EDUCATION/TRAINING PROGRAM

## 2024-03-13 RX ADMIN — ACETAMINOPHEN 1000 MG: 500 TABLET, FILM COATED ORAL at 20:01

## 2024-03-13 RX ADMIN — IBUPROFEN 800 MG: 800 TABLET, FILM COATED ORAL at 22:52

## 2024-03-13 RX ADMIN — DOCUSATE SODIUM 100 MG: 100 CAPSULE, LIQUID FILLED ORAL at 20:01

## 2024-03-13 RX ADMIN — ACETAMINOPHEN 1000 MG: 500 TABLET, FILM COATED ORAL at 02:03

## 2024-03-13 RX ADMIN — IBUPROFEN 800 MG: 800 TABLET, FILM COATED ORAL at 05:56

## 2024-03-13 RX ADMIN — NIFEDIPINE 30 MG: 30 TABLET, FILM COATED, EXTENDED RELEASE ORAL at 05:56

## 2024-03-13 RX ADMIN — ACETAMINOPHEN 1000 MG: 500 TABLET, FILM COATED ORAL at 13:42

## 2024-03-13 RX ADMIN — IBUPROFEN 800 MG: 800 TABLET, FILM COATED ORAL at 13:42

## 2024-03-13 RX ADMIN — ACETAMINOPHEN 1000 MG: 500 TABLET, FILM COATED ORAL at 08:55

## 2024-03-13 ASSESSMENT — PAIN DESCRIPTION - PAIN TYPE
TYPE: SURGICAL PAIN

## 2024-03-13 NOTE — CARE PLAN
The patient is Stable - Low risk of patient condition declining or worsening    Shift Goals  Clinical Goals: VSS, discharge  Patient Goals: Healthy mom healthy baby  Family Goals: Adequate support of pt and baby    Progress made toward(s) clinical / shift goals:      Problem: Knowledge Deficit - Postpartum  Goal: Patient will verbalize and demonstrate understanding of self and infant care  Outcome: Progressing     Problem: Bowel Elimination - Post Surgical  Goal: Patient will resume regular bowel sounds and function with no discomfort or distention  Outcome: Progressing     Problem: Early Mobilization - Post Surgery  Goal: Early mobilization post surgery  Outcome: Progressing

## 2024-03-13 NOTE — PROGRESS NOTES
Received report and assumed care. Patient is Icelandic speaking only and understands that I am only able to speak and understand a few words in Icelandic, so it is very important for me to use  on my phone for better communication. The patient was agreeable with the plan. No needs at this this time. Will monitor.

## 2024-03-13 NOTE — CARE PLAN
The patient is Stable - Low risk of patient condition declining or worsening    Shift Goals  Clinical Goals: VSS, discharge  Patient Goals: Healthy mom healthy baby  Family Goals: Adequate support of pt and baby    Progress made toward(s) clinical / shift goals:      Problem: Knowledge Deficit - Postpartum  Goal: Patient will verbalize and demonstrate understanding of self and infant care  3/13/2024 0646 by Alvina Macdonald R.TONIA.  Outcome: Progressing  3/13/2024 0641 by Alvina Macdonald R.N.  Outcome: Progressing     Problem: Psychosocial - Postpartum  Goal: Patient will verbalize and demonstrate effective bonding and parenting behavior  Outcome: Progressing     Problem: Bowel Elimination - Post Surgical  Goal: Patient will resume regular bowel sounds and function with no discomfort or distention  Outcome: Progressing     Problem: Early Mobilization - Post Surgery  Goal: Early mobilization post surgery  3/13/2024 0646 by Alvina Macdonald, R.N.  Outcome: Progressing  3/13/2024 0641 by Alvina Macdonald R.N.  Outcome: Progressing

## 2024-03-13 NOTE — CARE PLAN
The patient is Stable - Low risk of patient condition declining or worsening    Shift Goals  Clinical Goals: clincally stable  Patient Goals: Healthy mom healthy baby  Family Goals: Adequate support of pt and baby    Progress made toward(s) clinical / shift goals:  patient has been clinically stable    Patient is not progressing towards the following goals:

## 2024-03-13 NOTE — PROGRESS NOTES
Post Partum Progress Note    Name:   Liza Pearson   Date/Time:  3/13/2024 - 7:17 AM  Chief Admitting Dx:  Indication for care in labor or delivery [O75.9]  Delivery Type:   for repeat, Pre-E with SF at 35wk, GDMA1  Post-Op/Post Partum Days #:  2    Subjective:  Abdominal pain: no  Ambulating:   yes  Tolerating liquids:  yes  Tolerating food:  yes common adult  Flatus:   yes  BM:    no  Bleeding:   with a small amount of bleeding  Voiding:   yes  Dizziness:   no  Feeding:   bottle     Vitals:    24 1800 24 2200 24 0203 24 0600   BP: 138/88 (!) 149/83 (!) 144/95 (!) 145/81   Pulse: 82 82 65 79   Resp: 16 18 18 18   Temp: 36.9 °C (98.4 °F) 36.2 °C (97.2 °F) 36.6 °C (97.8 °F) 36.5 °C (97.7 °F)   TempSrc: Temporal Temporal Temporal Temporal   SpO2: 97% 93% 98% 97%   Weight:       Height:           Exam:  Breast: Tenderness no and Engorged no  Abdomen: Abdomen soft, non-tender. BS normal. No masses,  No organomegaly  Fundal Tenderness:  no  Fundus Firm: yes  Incision: no evidence of infection, separation or keloid formation. Mepilex in place  Below umbilicus: yes  Perineum: perineum intact  Lochia: mild  Extremities: trace extremities, peripheral pulses and reflexes normal    Meds:  Current Facility-Administered Medications   Medication Dose    acetaminophen (Tylenol) tablet 1,000 mg  1,000 mg    Followed by    [START ON 3/15/2024] acetaminophen (Tylenol) tablet 1,000 mg  1,000 mg    LR infusion      oxytocin (Pitocin) infusion (for post delivery)  125 mL/hr    lactated ringers infusion      magnesium sulfate 40 g/1000mL infusion  2 g/hr    NIFEdipine SR (Procadia-XL) tablet 30 mg  30 mg    lactated ringers (LR) infusion      oxytocin (Pitocin) infusion (for post delivery)  125 mL/hr    oxytocin (Pitocin) injection 10 Units  10 Units    lactated ringers infusion      lactated ringers infusion      ibuprofen (Motrin) tablet 800 mg  800 mg    Followed by    [START ON 3/15/2024]  ibuprofen (Motrin) tablet 800 mg  800 mg    oxyCODONE immediate-release (Roxicodone) tablet 5 mg  5 mg    oxyCODONE immediate-release (Roxicodone) tablet 10 mg  10 mg    ondansetron (Zofran) syringe/vial injection 4 mg  4 mg    Or    ondansetron (Zofran ODT) dispertab 4 mg  4 mg    diphenhydrAMINE (Benadryl) tablet/capsule 25 mg  25 mg    Or    diphenhydrAMINE (Benadryl) injection 25 mg  25 mg    docusate sodium (Colace) capsule 100 mg  100 mg    simethicone (Mylicon) chewable tablet 125 mg  125 mg       Labs:   Recent Labs     24  0207 24  0520 24  1800   WBC 8.2 8.8 16.4*   RBC 3.76* 3.71* 3.22*   HEMOGLOBIN 13.1 12.7 11.2*   HEMATOCRIT 36.7* 36.6* 32.8*   MCV 97.6 98.7* 101.9*   MCH 34.8* 34.2* 34.8*   MCHC 35.7* 34.7 34.1   RDW 44.3 45.1 47.0   PLATELETCT 221 220 226   MPV 11.5 11.4 11.2       Assessment:  Chief Admitting Dx:  Indication for care in labor or delivery [O75.9]  Delivery Type:   for repeat  Tubal Ligation:  no    Plan:  Continue routine post partum care. Advance care, encourage ambulation, pain control, monitor BPs as Mag stopped yesterday. Will continue Nifedipine at current dose, monitor BP and consider D/c later today if BPs stable, but likely tomorrow, POD#3.     Taty Dawn PMALLY.

## 2024-03-13 NOTE — PROGRESS NOTES
1910- Received report from Laura. Assumed care. 12 hour chart check, MAR and orders reviewed.      2045- Assessment complete. Fundus firm one finger below umibilicus and palpable, lochia scant rubra. Pain management and interventions discussed with pt. SO at bedside. POC discussed. All questions and concerns discussed. No further concerns.

## 2024-03-14 ENCOUNTER — PHARMACY VISIT (OUTPATIENT)
Dept: PHARMACY | Facility: MEDICAL CENTER | Age: 29
End: 2024-03-14
Payer: COMMERCIAL

## 2024-03-14 VITALS
BODY MASS INDEX: 30.82 KG/M2 | RESPIRATION RATE: 16 BRPM | SYSTOLIC BLOOD PRESSURE: 147 MMHG | WEIGHT: 185 LBS | HEIGHT: 65 IN | TEMPERATURE: 97.9 F | OXYGEN SATURATION: 97 % | DIASTOLIC BLOOD PRESSURE: 88 MMHG | HEART RATE: 78 BPM

## 2024-03-14 PROCEDURE — A9270 NON-COVERED ITEM OR SERVICE: HCPCS | Performed by: NURSE PRACTITIONER

## 2024-03-14 PROCEDURE — 700102 HCHG RX REV CODE 250 W/ 637 OVERRIDE(OP): Performed by: NURSE PRACTITIONER

## 2024-03-14 PROCEDURE — 700102 HCHG RX REV CODE 250 W/ 637 OVERRIDE(OP): Performed by: STUDENT IN AN ORGANIZED HEALTH CARE EDUCATION/TRAINING PROGRAM

## 2024-03-14 PROCEDURE — A9270 NON-COVERED ITEM OR SERVICE: HCPCS | Performed by: STUDENT IN AN ORGANIZED HEALTH CARE EDUCATION/TRAINING PROGRAM

## 2024-03-14 PROCEDURE — RXMED WILLOW AMBULATORY MEDICATION CHARGE

## 2024-03-14 RX ORDER — IBUPROFEN 800 MG/1
800 TABLET ORAL EVERY 8 HOURS
Qty: 30 TABLET | Refills: 0 | Status: SHIPPED | OUTPATIENT
Start: 2024-03-14

## 2024-03-14 RX ORDER — OXYCODONE HYDROCHLORIDE 5 MG/1
5 TABLET ORAL EVERY 4 HOURS PRN
Qty: 30 TABLET | Refills: 0 | Status: SHIPPED | OUTPATIENT
Start: 2024-03-14 | End: 2024-03-28

## 2024-03-14 RX ORDER — ACETAMINOPHEN 500 MG
1000 TABLET ORAL EVERY 6 HOURS PRN
Qty: 20 TABLET | Refills: 0 | Status: SHIPPED | OUTPATIENT
Start: 2024-03-14

## 2024-03-14 RX ORDER — NIFEDIPINE 30 MG/1
30 TABLET, EXTENDED RELEASE ORAL DAILY
Qty: 30 TABLET | Refills: 2 | Status: SHIPPED | OUTPATIENT
Start: 2024-03-15

## 2024-03-14 RX ADMIN — IBUPROFEN 800 MG: 800 TABLET, FILM COATED ORAL at 06:14

## 2024-03-14 RX ADMIN — NIFEDIPINE 30 MG: 30 TABLET, FILM COATED, EXTENDED RELEASE ORAL at 06:14

## 2024-03-14 RX ADMIN — ACETAMINOPHEN 1000 MG: 500 TABLET, FILM COATED ORAL at 03:21

## 2024-03-14 ASSESSMENT — PAIN DESCRIPTION - PAIN TYPE
TYPE: SURGICAL PAIN

## 2024-03-14 NOTE — DISCHARGE INSTRUCTIONS

## 2024-03-14 NOTE — DISCHARGE SUMMARY
Discharge Summary:      Liza Pearson    Admit Date:   3/10/2024  Discharge Date:  3/14/2024     Admitting diagnosis:  Indication for care in labor or delivery [O75.9]  Discharge Diagnosis: Status post  for repeat.  Pregnancy Complications: preeclampsia  Tubal Ligation:  no        History:  Past Medical History:   Diagnosis Date    Diabetes (HCC)     Pre-Diabetic    Hyperlipidemia 2023     OB History    Para Term  AB Living   4 2 1 1 2 2   SAB IAB Ectopic Molar Multiple Live Births   2       0 2      # Outcome Date GA Lbr Kin/2nd Weight Sex Delivery Anes PTL Lv   4  24 35w0d  3.03 kg (6 lb 10.9 oz) M CS-LTranv Spinal N OMER   3 SAB 2022     SAB         Birth Comments: passed on its own   2 Term 21 37w1d  3.52 kg (7 lb 12.2 oz) M CS-LTranv EPI  OMER      Complications: Failure to Progress in Second Stage   1 SAB 20 7w0d               Patient has no known allergies.  Patient Active Problem List    Diagnosis Date Noted    Severe pre-eclampsia in third trimester 2024    Elevated LFTs 2023    Hyperlipidemia 2023    Pelvic pain 2023    Diet controlled gestational diabetes mellitus (GDM) in first trimester 2023    Prediabetes 2023    Spontaneous  2023    History of pre-eclampsia - start 81 mg ASA at 12 weeks 2022    History of  delivery - desires TOLAC 2022        Hospital Course:   29 y.o. , now para 2, was admitted with the above mentioned diagnosis, underwent Repeat    . Patient postpartum course was unremarkable, with progressive advancement in diet , ambulation and toleration of oral analgesia. Patient without complaints today and desires discharge.      Vitals:    24 1746 24 2200 24 0200 24 0600   BP: (!) 140/83 (!) 146/87 (!) 143/88 136/81   Pulse: 68 77 74 80   Resp: 18 17 17 17   Temp: 37.1 °C (98.7 °F) 36.9 °C (98.4 °F) 36.3 °C (97.3 °F)  36.4 °C (97.6 °F)   TempSrc: Temporal Temporal Temporal Temporal   SpO2: 97% 98% 98% 98%   Weight:       Height:           Current Facility-Administered Medications   Medication Dose    acetaminophen (Tylenol) tablet 1,000 mg  1,000 mg    Followed by    [START ON 3/15/2024] acetaminophen (Tylenol) tablet 1,000 mg  1,000 mg    NIFEdipine SR (Procadia-XL) tablet 30 mg  30 mg    lactated ringers infusion      ibuprofen (Motrin) tablet 800 mg  800 mg    Followed by    [START ON 3/15/2024] ibuprofen (Motrin) tablet 800 mg  800 mg    oxyCODONE immediate-release (Roxicodone) tablet 5 mg  5 mg    oxyCODONE immediate-release (Roxicodone) tablet 10 mg  10 mg    ondansetron (Zofran) syringe/vial injection 4 mg  4 mg    Or    ondansetron (Zofran ODT) dispertab 4 mg  4 mg    diphenhydrAMINE (Benadryl) tablet/capsule 25 mg  25 mg    Or    diphenhydrAMINE (Benadryl) injection 25 mg  25 mg    docusate sodium (Colace) capsule 100 mg  100 mg    simethicone (Mylicon) chewable tablet 125 mg  125 mg       Exam:  Breast Exam: negative  Abdomen: Abdomen soft, non-tender. BS normal. No masses,  No organomegaly  Fundus Non Tender: yes  Incision: no evidence of infection, separation or keloid formation.  Perineum: perineum intact  Extremity: extremities, peripheral pulses and reflexes normal, feet normal, good pulses, normal color, temperature and sensation, 1+ edema bilaterally     Labs:  Recent Labs     03/11/24  1800   WBC 16.4*   RBC 3.22*   HEMOGLOBIN 11.2*   HEMATOCRIT 32.8*   .9*   MCH 34.8*   MCHC 34.1   RDW 47.0   PLATELETCT 226   MPV 11.2        Activity:   Discharge to home  Pelvic Rest x 6 weeks    Assessment:  normal postpartum course  Discharge Assessment: No areas of skin breakdown/redness; surgical incision intact/healing  Preeclampsia--will continue on Nifedipine 30mg daily, check BOP at home and let us know of severe elevations. Discussed s/s, BP check in 2-5 days in clinic.     Follow up:   3-5 days for BP check and  1 week for incision check; the 5-6 weeks for routine PP care     Discharge Meds:   Current Outpatient Medications   Medication Sig Dispense Refill    acetaminophen (TYLENOL) 500 MG Tab Take 2 Tablets by mouth every 6 hours as needed for Mild Pain. 20 Tablet 0    ibuprofen (MOTRIN) 800 MG Tab Take 1 Tablet by mouth every 8 hours. 30 Tablet 0    oxyCODONE immediate-release (ROXICODONE) 5 MG Tab Take 1 Tablet by mouth every four hours as needed for Severe Pain for up to 14 days. 30 Tablet 0    [START ON 3/15/2024] NIFEdipine SR (PROCADIA-XL) 30 MG tablet Take 1 Tablet by mouth every day. 30 Tablet 2       Vicky Rose C.N.M.

## 2024-03-14 NOTE — PROGRESS NOTES
Assumed patient care. Pt is resting comfortably with baby in crib. Patient assessment completed. Discussed infant bulb suction and emergency call light system. POC reviewed with patient, all questions answered. Will continue to monitor, call light in reach.

## 2024-03-14 NOTE — PROGRESS NOTES
Assessment completed. Fundus firm, lochia light. VSS. Tolerating diet. Voiding without difficulty, incision dressing CDI. Pt states passing gas. Pain controlled with PRN medications per MAR. Will offer pain medications as they become available. FOB at bedside, bonding with patient and baby. POC discussed with patient. Encouraged to call with needs, Call light within reach.

## 2024-03-14 NOTE — CARE PLAN
The patient is Stable - Low risk of patient condition declining or worsening    Shift Goals  Clinical Goals: VSS, lochia light, fundus firm  Patient Goals: bond with infant  Family Goals: support    Progress made toward(s) clinical / shift goals:  Pt is able to care for self and infant. Pt is bonding with infant. Fundus is firm and lochia is scant.       Problem: Knowledge Deficit - Postpartum  Goal: Patient will verbalize and demonstrate understanding of self and infant care  Outcome: Progressing     Problem: Psychosocial - Postpartum  Goal: Patient will verbalize and demonstrate effective bonding and parenting behavior  Outcome: Progressing     Problem: Altered Physiologic Condition  Goal: Patient physiologically stable as evidenced by normal lochia, palpable uterine involution and vitals within normal limits  Outcome: Progressing     Problem: Infection - Postpartum  Goal: Postpartum patient will be free of signs and symptoms of infection  Outcome: Progressing     Problem: Respiratory/Oxygenation Function Post-Surgical  Goal: Patient will achieve/maintain normal respiratory rate/effort  Outcome: Progressing     Problem: Early Mobilization - Post Surgery  Goal: Early mobilization post surgery  Outcome: Progressing       Patient is not progressing towards the following goals:

## 2024-04-02 ENCOUNTER — GYNECOLOGY VISIT (OUTPATIENT)
Dept: OBGYN | Facility: CLINIC | Age: 29
End: 2024-04-02
Payer: MEDICAID

## 2024-04-02 VITALS — DIASTOLIC BLOOD PRESSURE: 62 MMHG | WEIGHT: 158.4 LBS | SYSTOLIC BLOOD PRESSURE: 124 MMHG | BODY MASS INDEX: 26.36 KG/M2

## 2024-04-02 DIAGNOSIS — Z98.891 STATUS POST CESAREAN SECTION: ICD-10-CM

## 2024-04-02 PROCEDURE — 3078F DIAST BP <80 MM HG: CPT | Performed by: OBSTETRICS & GYNECOLOGY

## 2024-04-02 PROCEDURE — 3074F SYST BP LT 130 MM HG: CPT | Performed by: OBSTETRICS & GYNECOLOGY

## 2024-04-02 PROCEDURE — 99024 POSTOP FOLLOW-UP VISIT: CPT | Performed by: OBSTETRICS & GYNECOLOGY

## 2024-04-02 ASSESSMENT — FIBROSIS 4 INDEX: FIB4 SCORE: 0.64

## 2024-04-02 NOTE — PROGRESS NOTES
Incision Check    CC: s/p c/s incision check    HPI: 29 y.o.  s/p  delivery on 3/11/24 with Dr. Kemp for pre-eclampsia with severe features here for incision check.   Pt reports doing well with minimal pain.  No fevers.  Denies trouble with urination or BM.  Minimal vaginal bleeding.    BFing which is going well.     Denies concerns about mood.       /62 (BP Location: Left arm, Patient Position: Sitting, BP Cuff Size: Adult)   Wt 158 lb 6.4 oz   LMP 07/10/2023 (Approximate)   Breastfeeding Yes   BMI 26.36 kg/m²   Gen: AAO, NAD  Abd: soft, NT, ND, incision C/D/I, healing well    A/P: 29 y.o.  s/p c/s on 3/11/24 doing well  - no signs of postop complications  - encouraged BFing, lactation visit prn  - no signs of PP depression  - contraception: does not want contraception> Discussed recommendation to wait one year before conceiving after  section for uterine healing.       RTC for routine postpartum visit in approx 3-4wks

## 2024-04-02 NOTE — PROGRESS NOTES
Pt is here for C/S incision check.  Operation date:3/11/24  Confirmed good ph#, pharmacy, drug allergy, and medications on file.  Currently breastfeeding.  Pt states no other complaints for today.

## 2024-04-17 ENCOUNTER — APPOINTMENT (OUTPATIENT)
Dept: OBGYN | Facility: CLINIC | Age: 29
End: 2024-04-17
Payer: MEDICAID

## 2024-05-07 ENCOUNTER — APPOINTMENT (OUTPATIENT)
Dept: OBGYN | Facility: CLINIC | Age: 29
End: 2024-05-07
Payer: MEDICAID

## 2024-05-07 ENCOUNTER — HOSPITAL ENCOUNTER (OUTPATIENT)
Facility: MEDICAL CENTER | Age: 29
End: 2024-05-07
Attending: OBSTETRICS & GYNECOLOGY
Payer: MEDICAID

## 2024-05-07 VITALS — SYSTOLIC BLOOD PRESSURE: 132 MMHG | BODY MASS INDEX: 26.96 KG/M2 | DIASTOLIC BLOOD PRESSURE: 78 MMHG | WEIGHT: 162 LBS

## 2024-05-07 DIAGNOSIS — Z12.4 CERVICAL CANCER SCREENING: ICD-10-CM

## 2024-05-07 DIAGNOSIS — Z30.011 ENCOUNTER FOR INITIAL PRESCRIPTION OF CONTRACEPTIVE PILLS: ICD-10-CM

## 2024-05-07 PROCEDURE — 3078F DIAST BP <80 MM HG: CPT | Performed by: OBSTETRICS & GYNECOLOGY

## 2024-05-07 PROCEDURE — 3075F SYST BP GE 130 - 139MM HG: CPT | Performed by: OBSTETRICS & GYNECOLOGY

## 2024-05-07 PROCEDURE — 0503F POSTPARTUM CARE VISIT: CPT | Performed by: OBSTETRICS & GYNECOLOGY

## 2024-05-07 PROCEDURE — RXMED WILLOW AMBULATORY MEDICATION CHARGE: Performed by: OBSTETRICS & GYNECOLOGY

## 2024-05-07 RX ORDER — ACETAMINOPHEN AND CODEINE PHOSPHATE 120; 12 MG/5ML; MG/5ML
1 SOLUTION ORAL DAILY
Qty: 84 TABLET | Refills: 3 | Status: SHIPPED | OUTPATIENT
Start: 2024-05-07

## 2024-05-07 ASSESSMENT — EDINBURGH POSTNATAL DEPRESSION SCALE (EPDS)
I HAVE BEEN ANXIOUS OR WORRIED FOR NO GOOD REASON: NO, NOT AT ALL
THINGS HAVE BEEN GETTING ON TOP OF ME: NO, MOST OF THE TIME I HAVE COPED QUITE WELL
I HAVE BEEN SO UNHAPPY THAT I HAVE BEEN CRYING: NO, NEVER
I HAVE BLAMED MYSELF UNNECESSARILY WHEN THINGS WENT WRONG: NO, NEVER
I HAVE LOOKED FORWARD WITH ENJOYMENT TO THINGS: AS MUCH AS I EVER DID
I HAVE FELT SCARED OR PANICKY FOR NO GOOD REASON: NO, NOT AT ALL
TOTAL SCORE: 1
I HAVE BEEN SO UNHAPPY THAT I HAVE HAD DIFFICULTY SLEEPING: NOT AT ALL
THE THOUGHT OF HARMING MYSELF HAS OCCURRED TO ME: NEVER
I HAVE BEEN ABLE TO LAUGH AND SEE THE FUNNY SIDE OF THINGS: AS MUCH AS I ALWAYS COULD
I HAVE FELT SAD OR MISERABLE: NO, NOT AT ALL

## 2024-05-07 ASSESSMENT — FIBROSIS 4 INDEX: FIB4 SCORE: 0.64

## 2024-05-07 NOTE — PROGRESS NOTES
Liza Pearson is a 29 y.o.  who returns to clinic today for her 6 week post op appointment after  performed at 35w0d on 3/11/24 for pre-eclampsia with severe features.  She reports that since being seen last she has been doing well.  She is bonding well with her infant and she is pumping and bottlefeeding breast milk.  She has not yet had a menstral cycle.  She is not longer taking any pain medications.  She is participating in her usual activities but has followed the lifting precautions and has not yet resumed intercourse.  Denies breast complaints, abnormal vaginal discharge, urinary symptoms, bowel complaints, or other concerns at this time.    All other systems are reviewed and are negative.    PMH, PSH, SH, and FH reviewed with patient today - changes made in chart.    /78   Wt 162 lb   LMP 07/10/2023   BMI 26.96 kg/m²   Breasts: no masses, engorgement, pain or erythema  CVS: RRR  Resp: CTA bilaterally  Abdomen: Abdomen soft, non-tender.  No masses,  No organomegaly  Incision: well healed without erythema or induration.   Extremities: no cyanosis, clubbing, no edema    No current facility-administered medications for this visit.       Lab: No results found for this or any previous visit (from the past 48 hour(s)).    [place]: 1    Liza Pearson is a 29 y.o.  returns to clinic today for her 6 week post partum/post op appointmet after .  Pregnancy c/b   #post partum/post op.  Meeting all milestones.  Discussed she may resume intercourse and no longer has any lifting precautions  ?Breastfeeding.  Encouraged continuation of exclusive BF until at least 6 months.   #Cervical cancer screening.  Last pap - done today    #Post partum depression score. 1  #Post partum birth control method: She was counselled about the various options including OCPs, patch, ring, IUDs, and implant.  --desires micronor- ordered.  #Recommend follow up in a year for annual  or sooner if issues or concerns arise.  # discussed that she can come off of her blood pressure medication. She has a BP cuff at home and she will monitor. Discussed precautions and when to FU with PCP.   EFRAÍN

## 2024-05-07 NOTE — PROGRESS NOTES
Pt is here for postpartum exam  Pharmacy verified.  Good Phone #:[phone]  C/S date:3/11/24  Breastfeeding.  LMP:7/10/23  Last Pap:06/05/2021 Negative  BCM:None  Pt states no other complaints for today.  EPDS:1

## 2024-05-10 LAB
C TRACH RRNA CVX QL NAA+PROBE: NEGATIVE
CYTOLOGIST CVX/VAG CYTO: NORMAL
CYTOLOGY CVX/VAG DOC CYTO: NORMAL
CYTOLOGY CVX/VAG DOC THIN PREP: NORMAL
HPV I/H RISK 4 DNA CVX QL PROBE+SIG AMP: NEGATIVE
N GONORRHOEA RRNA CVX QL NAA+PROBE: NEGATIVE
NOTE NL11727A: NORMAL
OTHER STN SPEC: NORMAL
STAT OF ADQ CVX/VAG CYTO-IMP: NORMAL

## 2024-05-11 ENCOUNTER — PHARMACY VISIT (OUTPATIENT)
Dept: PHARMACY | Facility: MEDICAL CENTER | Age: 29
End: 2024-05-11
Payer: COMMERCIAL

## 2024-09-09 NOTE — H&P
Pt to have EEG today, await neurology to clear the pt.     Precert to Christine obtained and is good through 9/13.     09/09/24 1144   Discharge Planning   Expected Discharge Disposition SNF  ([facility])        \Obstetrics & Gynecology History & Physical Note    Date of Service  3/11/2024    Chief Complaint  Elevated blood pressures at home with home BP cuff    History of Presenting Illness  Liza Pearson is a 29 y.o.  at 35w0d 4/15/2024, by Last Menstrual Period. Patient's last menstrual period was 07/10/2023. She is being admitted for Pre-eclampsia w/ SF based on severe range blood pressures.  She reports she was getting readings in the 160/100's at home while taking Bps.  She denies headache, RUQ pain or vision changes.  She denies contractions, LOF or VB and endorses good FM.      She is a diet controlled GDM and her blood sugars have been well controlled.    US on 24 placed baby at 2730g with EFW at 94.4%ile.  Vertex.  Presentation confirmed today via US in triage and vertex.      History of IOL for gHTN,  which progressed to severe pre-eclampsia intrapartum with first pregnancy.  Progressed to complete and failure to descend/maternal exhaustion in second stage of labour.  Uncomplicated Primary .      Prenatal care is at [facility] and is complicated by:      Patient Active Problem List    Diagnosis Date Noted    Indication for care in labor or delivery 2024    Severe pre-eclampsia in third trimester 2024    Elevated LFTs 2023    Hyperlipidemia 2023    Pelvic pain 2023    Diet controlled gestational diabetes mellitus (GDM) in first trimester 2023    Prediabetes 2023    Spontaneous  2023    History of pre-eclampsia - start 81 mg ASA at 12 weeks 2022    History of  delivery - desires TOLAC 2022       Obstetric History  OB History    Para Term  AB Living   4 1 1   2 1   SAB IAB Ectopic Molar Multiple Live Births   2       0 1      # Outcome Date GA Lbr Kin/2nd Weight Sex Delivery Anes PTL Lv   4 Current            3 SAB 2022     SAB         Birth Comments: passed on its own   2 Term 21  37w1d  3.52 kg (7 lb 12.2 oz) M CS-LTranv EPI  OMER      Complications: Failure to Progress in Second Stage   1 SAB 20 7w0d              Gynecologic History  Denies hx of STI or abnormal pap    Review of Systems  Review of Systems   Constitutional: Negative.    HENT: Negative.     Eyes: Negative.  Negative for blurred vision and double vision.   Respiratory: Negative.     Cardiovascular: Negative.    Gastrointestinal: Negative.    Genitourinary: Negative.    Musculoskeletal: Negative.    Skin: Negative.    Neurological: Negative.  Negative for headaches.   Endo/Heme/Allergies: Negative.    Psychiatric/Behavioral: Negative.     All other systems reviewed and are negative.      Medical History  Past Medical History:   Diagnosis Date    Diabetes (HCC)     Pre-Diabetic    Hyperlipidemia 2023       Surgical History  Past Surgical History:   Procedure Laterality Date    PRIMARY C SECTION N/A 2021    Procedure:  SECTION, PRIMARY;  Surgeon: Harriet Gonzalez D.O.;  Location: [facility];  Service: Obstetrics          Family History  family history is not on file.     Social History   reports that she has never smoked. She has never used smokeless tobacco. She reports that she does not drink alcohol and does not use drugs.    Allergies  No Known Allergies    Medications  Prior to Admission Medications   Prescriptions Last Dose Informant Patient Reported? Taking?   Lancets   No No   Sig: Use one lancet to test blood sugar four times per day. Product as per insurance coverage.   Prenatal MV-Min-Fe Fum-FA-DHA (PRENATAL 1 PO)   Yes No   Sig: Take  by mouth.   aspirin 81 MG EC tablet   Yes No   Sig: Take 81 mg by mouth every day.   ferrous sulfate 325 (65 Fe) MG tablet   Yes No   Sig: Take 325 mg by mouth every day.   glucose blood strip   No No   Sig: Use one strip to test blood sugar four times per day      Facility-Administered Medications: None       Physical Exam  Vitals:    03/10/24  "2300   Weight: 83.9 kg (185 lb)   Height: 1.651 m (5' 5\")       General:   alert, cooperative, no distress   Skin:   normal   HEENT:  extraocular movements intact   Lungs:   clear to auscultation bilaterally   Heart:   regular rate and rhythm   Breasts:   deferred   Abdomen:  Abdomen soft, non-tender; gravid.   Pelvis: Exam deferred. EFW: 7lbs   FHT:  130 BPM Fetal heart variability: moderate  Fetal Heart Rate decelerations: none  Fetal Heart Rate accelerations: yes   Ida: External US   Presentations:   Vertex by US   Cervix:     Dilation:      Effacement:      Station:       Consistency:      Position:         Laboratory:  Prenatal Results       General (Most Recent Result)       Test Value Reference Range Date Time    ABO  A   09/22/23 1229    Rh  POS   09/22/23 1229    Antibody screen  NEG   09/22/23 1229    HbA1c  5.3 % 4.0 - 5.6 12/05/23 1441    Chlamydia by PCR  Negative  Negative 09/12/23 0921    Gonorrhea by PCR  Negative  Negative 09/12/23 0921    RPR/Syphilus  Non-Reactive  Non-Reactive 01/15/24 1308    HSV 1/2 by PCR (non-serum)        HSV 1/2 (serum)        HSV 1        HSV 2        HPV (16)        HBsAg  Non-Reactive  Non-Reactive 09/22/23 1229    HIV-1 HIV-2 Antibodies  Non-Reactive  Non Reactive 09/22/23 1229    Rubella  167.00 IU/mL  09/22/23 1229    Tb                  Pap Smear (Most Recent Result)       Test Value Reference Range Date Time    Pap smear        Pap smear w/HPV        Pap smear w/CTNG        Pap smar w/HPV CTNG        Pap smear (reflex HPV ACUS)        Pap smear (reflex HPV ASCUS w/CTNG)  (See Report)    06/04/21 1532    Pathology gyn specimen  (See Report)    06/04/21 1532              Urinalysis (Most Recent Result)       Test Value Reference Range Date Time    Urinalysis  Abnormal   (See Report)    11/08/21 1000    POC urinalysis  (See Report)    06/04/21 1453    Urine drug screen (w/ conf)  (See Report)    09/22/23 1230    Urine culture ([id number])  (See Report)    09/22/23 " 1229    Urine Protein/Creatinine Ratio  90 mg/g 10 - 107 09/22/23 1230              Urinalysis, Culture if indicated       Test Value Reference Range Date Time    Color  Yellow   03/11/23 1500    Appearance  Clear   03/11/23 1500    Specific Gravity  1.008  <1.035 03/11/23 1500    PH  5.5  5.0 - 8.0 03/11/23 1500    Glucose  Negative mg/dL Negative 03/11/23 1500    Ketones  Negative mg/dL Negative 03/11/23 1500    Protein  Negative mg/dL Negative 03/11/23 1500    Bilirubin  Negative  Negative 03/11/23 1500    Nitrites  Negative  Negative 03/11/23 1500    Leukocytes Esterase  Negative  Negative 03/11/23 1500    Blood  Large  Negative 03/11/23 1500    Comment  Microscopic   03/11/23 1500    Culture                  Urine Drug Screen       Test Value Reference Range Date Time    Amphetamines        Barbiturates  Negative ng/mL Cutoff 200 09/22/23 1230    Benzodiazepines  Negative ng/mL Cutoff 200 09/22/23 1230    Cocaine  Negative ng/mL Cutoff 150 09/22/23 1230    Methadone  Negative ng/mL Cutoff 150 09/22/23 1230    Opiates        Oxycodone        Phencylidine  Negative ng/mL Cutoff 25 09/22/23 1230    Propoxyphene        Marijuana Metabolite  Negative ng/mL Cutoff 50 09/22/23 1230              1st Trimester       Test Value Reference Range Date Time    Hgb  12.6 g/dL 12.0 - 16.0 09/22/23 1229    Hct  38.1 % 37.0 - 47.0 09/22/23 1229    Fasting Glucose Tolerance        GTT, 1 hour  183 mg/dL 70 - 139 09/22/23 1228    GTT, 2 hours  167 mg/dL 65 - 155 09/28/23 0817    GTT, 3 hours  72 mg/dL 65 - 140 09/28/23 0817              2nd Trimester       Test Value Reference Range Date Time    Hgb  13.0 g/dL 12.0 - 16.0 01/15/24 1308    Hct  38.2 % 37.0 - 47.0 01/15/24 1308    AST        ALT        Uric Acid        Fasting Glucose Tolerance        GTT, 1 hour        GTT, 2 hours        GTT, 3 hours                  3rd Trimester       Test Value Reference Range Date Time    Hgb  12.7 g/dL 12.0 - 16.0 03/11/24 0056    Hct  37.0 %  37.0 - 47.0 24 0056    Platelet count  219 K/uL 164 - 446 24 0056    GBS (JOHNSON BROTH)        Fasting Glucose Tolerance        GTT, 1 hour        GTT, 2 hous        GTT, 3hours                  Congenital Disease Screening       Test Value Reference Range Date Time    First Trimester Screen        Quad Screen        BH Electrophoresis        Cystic Fibrosis Carrier Study        SMA        AFP Maternal Serum         AFP Tetra  (See Report)    21 1733    NIPT  (See Report)    23               Legend    ^: Historical                              Urinalysis:    No results found     Imaging:  Normal anatomy scan      Assessment/PLan:  29 y.o.  35w0d who presents with    #pre eclampsia with SF:   -labs pending  -magnesium sulfate 4g bolus then 2g/hr  -start nifedipine XR 30mg PO daily  -SCDs  -Clear fluids  -I&O  #Coping: plans epidural  #IOL:  -TOLAC, Dr Kemp aware and will consent  -Will check cervix when admitted and make plan  #GDMA1: check blood sugars q 4 until active labour  #GBS swab    VTE prophylaxis: SCD    Lindsey Calvin C.N.M.

## 2024-09-23 ENCOUNTER — HOSPITAL ENCOUNTER (OUTPATIENT)
Dept: LAB | Facility: MEDICAL CENTER | Age: 29
End: 2024-09-23
Payer: MEDICAID

## 2024-09-23 LAB
25(OH)D3 SERPL-MCNC: 16 NG/ML (ref 30–100)
ALBUMIN SERPL BCP-MCNC: 4.4 G/DL (ref 3.2–4.9)
ALBUMIN/GLOB SERPL: 1.3 G/DL
ALP SERPL-CCNC: 84 U/L (ref 30–99)
ALT SERPL-CCNC: 28 U/L (ref 2–50)
ANION GAP SERPL CALC-SCNC: 14 MMOL/L (ref 7–16)
AST SERPL-CCNC: 18 U/L (ref 12–45)
BASOPHILS # BLD AUTO: 0.8 % (ref 0–1.8)
BASOPHILS # BLD: 0.07 K/UL (ref 0–0.12)
BILIRUB SERPL-MCNC: <0.2 MG/DL (ref 0.1–1.5)
BUN SERPL-MCNC: 15 MG/DL (ref 8–22)
CALCIUM ALBUM COR SERPL-MCNC: 9 MG/DL (ref 8.5–10.5)
CALCIUM SERPL-MCNC: 9.3 MG/DL (ref 8.5–10.5)
CHLORIDE SERPL-SCNC: 103 MMOL/L (ref 96–112)
CHOLEST SERPL-MCNC: 248 MG/DL (ref 100–199)
CO2 SERPL-SCNC: 21 MMOL/L (ref 20–33)
CREAT SERPL-MCNC: 0.56 MG/DL (ref 0.5–1.4)
EOSINOPHIL # BLD AUTO: 0.23 K/UL (ref 0–0.51)
EOSINOPHIL NFR BLD: 2.5 % (ref 0–6.9)
ERYTHROCYTE [DISTWIDTH] IN BLOOD BY AUTOMATED COUNT: 43 FL (ref 35.9–50)
EST. AVERAGE GLUCOSE BLD GHB EST-MCNC: 128 MG/DL
FASTING STATUS PATIENT QL REPORTED: NORMAL
GFR SERPLBLD CREATININE-BSD FMLA CKD-EPI: 126 ML/MIN/1.73 M 2
GLOBULIN SER CALC-MCNC: 3.3 G/DL (ref 1.9–3.5)
GLUCOSE SERPL-MCNC: 126 MG/DL (ref 65–99)
HBA1C MFR BLD: 6.1 % (ref 4–5.6)
HCT VFR BLD AUTO: 41.9 % (ref 37–47)
HCV AB SER QL: NORMAL
HDLC SERPL-MCNC: 29 MG/DL
HGB BLD-MCNC: 14.3 G/DL (ref 12–16)
IMM GRANULOCYTES # BLD AUTO: 0.03 K/UL (ref 0–0.11)
IMM GRANULOCYTES NFR BLD AUTO: 0.3 % (ref 0–0.9)
LDLC SERPL CALC-MCNC: ABNORMAL MG/DL
LYMPHOCYTES # BLD AUTO: 2.07 K/UL (ref 1–4.8)
LYMPHOCYTES NFR BLD: 22.5 % (ref 22–41)
MCH RBC QN AUTO: 33.1 PG (ref 27–33)
MCHC RBC AUTO-ENTMCNC: 34.1 G/DL (ref 32.2–35.5)
MCV RBC AUTO: 97 FL (ref 81.4–97.8)
MONOCYTES # BLD AUTO: 0.53 K/UL (ref 0–0.85)
MONOCYTES NFR BLD AUTO: 5.8 % (ref 0–13.4)
NEUTROPHILS # BLD AUTO: 6.28 K/UL (ref 1.82–7.42)
NEUTROPHILS NFR BLD: 68.1 % (ref 44–72)
NRBC # BLD AUTO: 0 K/UL
NRBC BLD-RTO: 0 /100 WBC (ref 0–0.2)
PLATELET # BLD AUTO: 319 K/UL (ref 164–446)
PMV BLD AUTO: 10.9 FL (ref 9–12.9)
POTASSIUM SERPL-SCNC: 4.1 MMOL/L (ref 3.6–5.5)
PROT SERPL-MCNC: 7.7 G/DL (ref 6–8.2)
RBC # BLD AUTO: 4.32 M/UL (ref 4.2–5.4)
SODIUM SERPL-SCNC: 138 MMOL/L (ref 135–145)
TRIGL SERPL-MCNC: 699 MG/DL (ref 0–149)
TSH SERPL DL<=0.005 MIU/L-ACNC: 1.47 UIU/ML (ref 0.38–5.33)
WBC # BLD AUTO: 9.2 K/UL (ref 4.8–10.8)

## 2024-09-23 PROCEDURE — 82306 VITAMIN D 25 HYDROXY: CPT

## 2024-09-23 PROCEDURE — 80053 COMPREHEN METABOLIC PANEL: CPT

## 2024-09-23 PROCEDURE — 86803 HEPATITIS C AB TEST: CPT

## 2024-09-23 PROCEDURE — 80061 LIPID PANEL: CPT

## 2024-09-23 PROCEDURE — 83036 HEMOGLOBIN GLYCOSYLATED A1C: CPT

## 2024-09-23 PROCEDURE — 84443 ASSAY THYROID STIM HORMONE: CPT

## 2024-09-23 PROCEDURE — 85025 COMPLETE CBC W/AUTO DIFF WBC: CPT

## 2024-09-23 PROCEDURE — 36415 COLL VENOUS BLD VENIPUNCTURE: CPT

## 2025-03-31 ENCOUNTER — HOSPITAL ENCOUNTER (OUTPATIENT)
Dept: LAB | Facility: MEDICAL CENTER | Age: 30
End: 2025-03-31
Payer: MEDICAID

## 2025-03-31 LAB
DHEA-S SERPL-MCNC: 353 UG/DL (ref 98.8–340)
ESTRADIOL SERPL-MCNC: 50.1 PG/ML
FSH SERPL-ACNC: 5.6 MIU/ML
PROLACTIN SERPL-MCNC: 4.74 NG/ML (ref 2.8–26)
TESTOST SERPL-MCNC: 37 NG/DL (ref 9–75)

## 2025-03-31 PROCEDURE — 83001 ASSAY OF GONADOTROPIN (FSH): CPT

## 2025-03-31 PROCEDURE — 84146 ASSAY OF PROLACTIN: CPT

## 2025-03-31 PROCEDURE — 82627 DEHYDROEPIANDROSTERONE: CPT

## 2025-03-31 PROCEDURE — 82670 ASSAY OF TOTAL ESTRADIOL: CPT

## 2025-03-31 PROCEDURE — 84403 ASSAY OF TOTAL TESTOSTERONE: CPT

## 2025-03-31 PROCEDURE — 36415 COLL VENOUS BLD VENIPUNCTURE: CPT

## 2025-05-09 PROCEDURE — RXMED WILLOW AMBULATORY MEDICATION CHARGE: Performed by: ADVANCED PRACTICE MIDWIFE

## 2025-05-11 ENCOUNTER — PHARMACY VISIT (OUTPATIENT)
Dept: PHARMACY | Facility: MEDICAL CENTER | Age: 30
End: 2025-05-11
Payer: COMMERCIAL

## (undated) DEVICE — WATER IRRIGATION STERILE 1000ML (12EA/CA)

## (undated) DEVICE — SUTURE 4-0 27IN VCRL PLUS ANTI (36PK/BX)

## (undated) DEVICE — HEMOSTAT ABSORBABLE POWDER SURGICEL 3G (5EA/BX)

## (undated) DEVICE — SUTURE 3-0 VICRYL PLUS CT-1 - 36 INCH (36/BX)

## (undated) DEVICE — STERI STRIP COMPOUND BENZOIN - TINCTURE 0.6ML WITH APPLICATOR (40EA/BX)

## (undated) DEVICE — GLOVE BIOGEL ECLIPSE PF LATEX SIZE 6.0 (50PR/BX)

## (undated) DEVICE — RETRACTOR O C SECTION LRY - (5/BX)

## (undated) DEVICE — ELECTRODE RADIOLUCENT LF 3PK - RED DOT (3/PK 200PK/CA)

## (undated) DEVICE — CANISTER SUCTION 3000ML MECHANICAL FILTER AUTO SHUTOFF MEDI-VAC NONSTERILE LF DISP  (40EA/CA)

## (undated) DEVICE — SENSOR SPO2 ADULT LNCS ADTX (20/BX) ORDER ITEM #19593

## (undated) DEVICE — SUTURE 0 VICRYL PLUS CTX - 36 INCH (36/BX)

## (undated) DEVICE — SUTURE ABSORBABLE MONOFILAMENT VIOLET MONOCRYL CT-1 L36 IN (36EA/BX)

## (undated) DEVICE — CHLORAPREP 26 ML APPLICATOR - ORANGE TINT(25/CA)

## (undated) DEVICE — PACK ROOM TURNOVER L&D (12/CA)

## (undated) DEVICE — SUCTION INSTRUMENT YANKAUER BULBOUS TIP W/O VENT (50EA/CA)

## (undated) DEVICE — BLANKET WARMING LOWER BODY (10EA/CA)

## (undated) DEVICE — TUBING CLEARLINK DUO-VENT - C-FLO (48EA/CA)

## (undated) DEVICE — PAD GROUNDING PRE-JELLED - (50EA/PK)

## (undated) DEVICE — SLEEVE, SEQUENTIAL CALF REG

## (undated) DEVICE — SOLUTION PLASMA-LYTE PH 7.4 INJ 1000ML  (14EA/CA)

## (undated) DEVICE — SET EXTENSION WITH 2 PORTS (48EA/CA) ***PART #2C8610 IS A SUBSTITUTE*****

## (undated) DEVICE — SUTURE 0 VICRYL PLUS CT-1 - 36 INCH (36/BX)

## (undated) DEVICE — PAD LAP STERILE 18 X 18 - (5/PK 40PK/CA)

## (undated) DEVICE — SENSOR SPO2 NEO LNCS ADHESIVE (20/BX) SEE USER NOTES

## (undated) DEVICE — SUTURE 2-0 MONOCRYL CT-1

## (undated) DEVICE — BLANKET UNDERBODY FULL ACCES - (5/CA)

## (undated) DEVICE — KIT  I.V. START (100EA/CA)

## (undated) DEVICE — PENCIL ELECTSURG 10FT HLSTR - WITH BLADE (50EA/CA)

## (undated) DEVICE — WRAP PROBE OXIMETER INFANT UNIVERSAL DESIGN TO FIT NEONATAL FOOT INFANT TOE OR PED FINGER  (12EA/BX)

## (undated) DEVICE — CLOSURE SKIN STRIP 1/2 X 4 IN - (STERI STRIP) (50/BX 4BX/CA)

## (undated) DEVICE — PACK C-SECTION (2EA/CA)

## (undated) DEVICE — STAPLER SKIN INSORB (6EA/BX)

## (undated) DEVICE — CATHETER IV NON-SAFETY 18 GA X 1 1/4 (50/BX 4BX/CA)

## (undated) DEVICE — SUTURE 3-0 MONOCRYL CT-1 ABS - 36IN (36/BX)

## (undated) DEVICE — DRESSING POST OP BORDER 4 X 10 (5EA/BX)

## (undated) DEVICE — HEAD HOLDER JUNIOR/ADULT

## (undated) DEVICE — GLOVE BIOGEL SZ 6 PF LATEX - (50EA/BX 4BX/CA)

## (undated) DEVICE — SODIUM CHL IRRIGATION 0.9% 1000ML (12EA/CA)

## (undated) DEVICE — TUBE CONNECT SUCTION CLEAR 120 X 1/4" (50EA/CA)"

## (undated) DEVICE — PLUMEPEN ULTRA 3/8 IN X 10 FT HOSE (20EA/CA)

## (undated) DEVICE — SUTURE 4-0 MONOCRYL PLUS PS-1 - 27 INCH (36/BX)

## (undated) DEVICE — CANNULA O2 COMFORT SOFT EAR ADULT 7 FT TUBING (50/CA)

## (undated) DEVICE — CHLORAPREP 3 ML APPLICATOR - (25/BX 4BX/CS 100/CS)

## (undated) DEVICE — TRAY SPINAL ANESTHESIA NON-SAFETY (10/CA)

## (undated) DEVICE — BLANKET STERILE CHICKIE FOR L&D (100/CA)

## (undated) DEVICE — BAG SPONGE COUNT 10.25 X 32 - BLUE (250/CA)

## (undated) DEVICE — SUTURE 2-0 VICRYL PLUS CT-1 36 (36PK/BX)"

## (undated) DEVICE — TRAY FOLEY CATHETER STATLOCK 16FR SURESTEP  (10EA/CA)